# Patient Record
Sex: FEMALE | Race: WHITE | NOT HISPANIC OR LATINO | Employment: OTHER | URBAN - METROPOLITAN AREA
[De-identification: names, ages, dates, MRNs, and addresses within clinical notes are randomized per-mention and may not be internally consistent; named-entity substitution may affect disease eponyms.]

---

## 2018-04-02 ENCOUNTER — TRANSFERRED RECORDS (OUTPATIENT)
Dept: HEALTH INFORMATION MANAGEMENT | Facility: CLINIC | Age: 68
End: 2018-04-02

## 2018-04-09 ENCOUNTER — TRANSFERRED RECORDS (OUTPATIENT)
Dept: HEALTH INFORMATION MANAGEMENT | Facility: CLINIC | Age: 68
End: 2018-04-09

## 2018-11-09 ENCOUNTER — TRANSFERRED RECORDS (OUTPATIENT)
Dept: HEALTH INFORMATION MANAGEMENT | Facility: CLINIC | Age: 68
End: 2018-11-09

## 2020-12-01 ENCOUNTER — TRANSFERRED RECORDS (OUTPATIENT)
Dept: HEALTH INFORMATION MANAGEMENT | Facility: CLINIC | Age: 70
End: 2020-12-01

## 2021-01-18 ENCOUNTER — TRANSFERRED RECORDS (OUTPATIENT)
Dept: HEALTH INFORMATION MANAGEMENT | Facility: CLINIC | Age: 71
End: 2021-01-18

## 2021-04-20 ENCOUNTER — TRANSFERRED RECORDS (OUTPATIENT)
Dept: HEALTH INFORMATION MANAGEMENT | Facility: CLINIC | Age: 71
End: 2021-04-20
Payer: COMMERCIAL

## 2021-11-11 ENCOUNTER — TRANSFERRED RECORDS (OUTPATIENT)
Dept: HEALTH INFORMATION MANAGEMENT | Facility: CLINIC | Age: 71
End: 2021-11-11
Payer: COMMERCIAL

## 2021-11-30 NOTE — PATIENT INSTRUCTIONS
Patient Education   Personalized Prevention Plan  You are due for the preventive services outlined below.  Your care team is available to assist you in scheduling these services.  If you have already completed any of these items, please share that information with your care team to update in your medical record.  Health Maintenance Due   Topic Date Due     Osteoporosis Screening  Never done     ANNUAL REVIEW OF HM ORDERS  Never done     Discuss Advance Care Planning  Never done     Mammogram  Never done     Colorectal Cancer Screening  Never done     COVID-19 Vaccine (1) Never done     Hepatitis C Screening  Never done     Diptheria Tetanus Pertussis (DTAP/TDAP/TD) Vaccine (1 - Tdap) Never done     Cholesterol Lab  Never done     Zoster (Shingles) Vaccine (1 of 2) Never done     Annual Wellness Visit  Never done     FALL RISK ASSESSMENT  Never done     Pneumococcal Vaccine (1 of 1 - PPSV23) Never done     PHQ-2  Never done

## 2021-11-30 NOTE — PROGRESS NOTES
"  SUBJECTIVE:   Sara Keita is a 71 year old female who presents for Preventive Visit.    {Split Bill scripting  The purpose of this visit is to discuss your medical history and prevent health problems before you are sick. You may be responsible for a co-pay, coinsurance, or deductible if your visit today includes services such as checking on a sore throat, having an x-ray or lab test, or treating and evaluating a new or existing condition :549478}  Patient has been advised of split billing requirements and indicates understanding: {Yes and No:631240}  Are you in the first 12 months of your Medicare Part B coverage?  { :482375::\"No\"}    Physical Health:    In general, how would you rate your overall physical health? { :987723}    Outside of work, how many days during the week do you exercise? { :630721}    Outside of work, approximately how many minutes a day do you exercise?{ :655024}    If you drink alcohol do you typically have >3 drinks per day or >7 drinks per week? { :839765}    Do you usually eat at least 4 servings of fruit and vegetables a day, include whole grains & fiber and avoid regularly eating high fat or \"junk\" foods? { :904280::\"Yes\"}    Do you have any problems taking medications regularly?  { :051725::\"No\"}    Do you have any side effects from medications? { :388614}    Needs assistance for the following daily activities: { :906183}    Which of the following safety concerns are present in your home?  { :651082::\"none identified\"}     Hearing impairment: { :371706}    In the past 6 months, have you been bothered by leaking of urine? { :872106}    Mental Health:    In general, how would you rate your overall mental or emotional health? { :328833}  PHQ-2 Score:      Do you feel safe in your environment? { :604468}    Have you ever done Advance Care Planning? (For example, a Health Directive, POLST, or a discussion with a medical provider or your loved ones about your wishes): { " ":541421}    Additional concerns to address?  {If YES, notify patient they may be responsible for a copay, coinsurance or deductible if the visit today includes services such as checking on a sore throat, having an x-ray or lab test, or treating and evaluating a new or existing condition :778983::\"No\"}    Fall risk:  { :052131}  {If any of the above assessments are answered yes, consider ordering appropriate referrals (Optional):509556::\"click delete button to remove this line now\"}  Cognitive Screening: { :827430}    {Do you have sleep apnea, excessive snoring or daytime drowsiness? (Optional):606669}    {Outside tests to abstract? :075955}    {additional problems to add (Optional):156433}    Reviewed and updated as needed this visit by clinical staff                Reviewed and updated as needed this visit by Provider               Social History     Tobacco Use     Smoking status: Not on file     Smokeless tobacco: Not on file   Substance Use Topics     Alcohol use: Not on file                           Current providers sharing in care for this patient include: {Rooming staff:  Please update Care Team in Rooming Activity, refresh this note and then delete this statement}  Patient Care Team:  Jasmin Mcintyre MD as PCP - General (Family Medicine)    The following health maintenance items are reviewed in Epic and correct as of today:  Health Maintenance   Topic Date Due     DEXA  Never done     ANNUAL REVIEW OF HM ORDERS  Never done     ADVANCE CARE PLANNING  Never done     MAMMO SCREENING  Never done     COLORECTAL CANCER SCREENING  Never done     COVID-19 Vaccine (1) Never done     HEPATITIS C SCREENING  Never done     DTAP/TDAP/TD IMMUNIZATION (1 - Tdap) Never done     LIPID  Never done     ZOSTER IMMUNIZATION (1 of 2) Never done     MEDICARE ANNUAL WELLNESS VISIT  Never done     FALL RISK ASSESSMENT  Never done     Pneumococcal Vaccine: 65+ Years (1 of 1 - PPSV23) Never done     PHQ-2  Never done     " "INFLUENZA VACCINE  Completed     IPV IMMUNIZATION  Aged Out     MENINGITIS IMMUNIZATION  Aged Out     HEPATITIS B IMMUNIZATION  Aged Out     {Chronicprobdata (Optional):435626}  {Decision Support (Optional):826336}    ROS:  {ROS COMP:494406}    OBJECTIVE:   There were no vitals taken for this visit. There is no height or weight on file to calculate BMI.  EXAM:   {Exam :236647}    {Diagnostic Test Results (Optional):455593::\"Diagnostic Test Results:\",\"Labs reviewed in Epic\"}    ASSESSMENT / PLAN:   {Diag Picklist:678592}    Patient has been advised of split billing requirements and indicates understanding: {YES / NO:299113::\"Yes\"}    COUNSELING:  {Medicare Counselin}    There is no height or weight on file to calculate BMI.    {Weight Management Plan (ACO) Complete if BMI is abnormal-  Ages 18-64  BMI >24.9.  Age 65+ with BMI <23 or >30 (Optional):145978}    She has no history on file for tobacco use.    Appropriate preventive services were discussed with this patient, including applicable screening as appropriate for cardiovascular disease, diabetes, osteopenia/osteoporosis, and glaucoma.  As appropriate for age/gender, discussed screening for colorectal cancer, prostate cancer, breast cancer, and cervical cancer. Checklist reviewing preventive services available has been given to the patient.    Reviewed patients plan of care and provided an AVS. The {CarePlan:890315} for Sara meets the Care Plan requirement. This Care Plan has been established and reviewed with the {PATIENT, FAMILY MEMBER, CAREGIVER:492304}.    Counseling Resources:  ATP IV Guidelines  Pooled Cohorts Equation Calculator  Breast Cancer Risk Calculator  BRCA-Related Cancer Risk Assessment: FHS-7 Tool  FRAX Risk Assessment  ICSI Preventive Guidelines  Dietary Guidelines for Americans, 2010  [employer]'s MyPlate  ASA Prophylaxis  Lung CA Screening    Jasmin Mcintyre MD  [facility]  "

## 2021-12-02 ENCOUNTER — OFFICE VISIT (OUTPATIENT)
Dept: FAMILY MEDICINE | Facility: CLINIC | Age: 71
End: 2021-12-02
Payer: COMMERCIAL

## 2021-12-02 VITALS
TEMPERATURE: 97.5 F | BODY MASS INDEX: 20.53 KG/M2 | OXYGEN SATURATION: 97 % | SYSTOLIC BLOOD PRESSURE: 117 MMHG | HEIGHT: 65 IN | HEART RATE: 59 BPM | WEIGHT: 123.2 LBS | DIASTOLIC BLOOD PRESSURE: 75 MMHG

## 2021-12-02 DIAGNOSIS — E78.00 ELEVATED CHOLESTEROL: ICD-10-CM

## 2021-12-02 DIAGNOSIS — Z00.00 ENCOUNTER FOR MEDICARE ANNUAL WELLNESS EXAM: Primary | ICD-10-CM

## 2021-12-02 DIAGNOSIS — R79.0 LOW IRON STORES: ICD-10-CM

## 2021-12-02 DIAGNOSIS — G43.809 OTHER MIGRAINE WITHOUT STATUS MIGRAINOSUS, NOT INTRACTABLE: ICD-10-CM

## 2021-12-02 DIAGNOSIS — K52.839 MICROSCOPIC COLITIS, UNSPECIFIED MICROSCOPIC COLITIS TYPE: ICD-10-CM

## 2021-12-02 DIAGNOSIS — B02.9 HERPES ZOSTER WITHOUT COMPLICATION: ICD-10-CM

## 2021-12-02 DIAGNOSIS — F41.9 ANXIETY: ICD-10-CM

## 2021-12-02 DIAGNOSIS — G47.00 INSOMNIA, UNSPECIFIED TYPE: ICD-10-CM

## 2021-12-02 DIAGNOSIS — M85.88 OSTEOPENIA OF LUMBAR SPINE: ICD-10-CM

## 2021-12-02 LAB
ALBUMIN SERPL-MCNC: 3.6 G/DL (ref 3.4–5)
ALP SERPL-CCNC: 59 U/L (ref 40–150)
ALT SERPL W P-5'-P-CCNC: 26 U/L (ref 0–50)
ANION GAP SERPL CALCULATED.3IONS-SCNC: 2 MMOL/L (ref 3–14)
AST SERPL W P-5'-P-CCNC: 26 U/L (ref 0–45)
BILIRUB SERPL-MCNC: 0.7 MG/DL (ref 0.2–1.3)
BUN SERPL-MCNC: 11 MG/DL (ref 7–30)
CALCIUM SERPL-MCNC: 8.7 MG/DL (ref 8.5–10.1)
CHLORIDE BLD-SCNC: 103 MMOL/L (ref 94–109)
CHOLEST SERPL-MCNC: 146 MG/DL
CO2 SERPL-SCNC: 29 MMOL/L (ref 20–32)
CREAT SERPL-MCNC: 0.5 MG/DL (ref 0.52–1.04)
ERYTHROCYTE [DISTWIDTH] IN BLOOD BY AUTOMATED COUNT: 13.5 % (ref 10–15)
FASTING STATUS PATIENT QL REPORTED: NO
FERRITIN SERPL-MCNC: 60 NG/ML (ref 8–252)
GFR SERPL CREATININE-BSD FRML MDRD: >90 ML/MIN/1.73M2
GLUCOSE BLD-MCNC: 85 MG/DL (ref 70–99)
HCT VFR BLD AUTO: 41.7 % (ref 35–47)
HDLC SERPL-MCNC: 81 MG/DL
HGB BLD-MCNC: 13.8 G/DL (ref 11.7–15.7)
IRON SATN MFR SERPL: 33 % (ref 15–46)
IRON SERPL-MCNC: 109 UG/DL (ref 35–180)
LDLC SERPL CALC-MCNC: 56 MG/DL
MCH RBC QN AUTO: 31.2 PG (ref 26.5–33)
MCHC RBC AUTO-ENTMCNC: 33.1 G/DL (ref 31.5–36.5)
MCV RBC AUTO: 94 FL (ref 78–100)
NONHDLC SERPL-MCNC: 65 MG/DL
PLATELET # BLD AUTO: 389 10E3/UL (ref 150–450)
POTASSIUM BLD-SCNC: 4 MMOL/L (ref 3.4–5.3)
PROT SERPL-MCNC: 7.2 G/DL (ref 6.8–8.8)
RBC # BLD AUTO: 4.43 10E6/UL (ref 3.8–5.2)
SODIUM SERPL-SCNC: 134 MMOL/L (ref 133–144)
TIBC SERPL-MCNC: 326 UG/DL (ref 240–430)
TRIGL SERPL-MCNC: 47 MG/DL
WBC # BLD AUTO: 6.4 10E3/UL (ref 4–11)

## 2021-12-02 PROCEDURE — 80053 COMPREHEN METABOLIC PANEL: CPT | Performed by: FAMILY MEDICINE

## 2021-12-02 PROCEDURE — 36415 COLL VENOUS BLD VENIPUNCTURE: CPT | Performed by: FAMILY MEDICINE

## 2021-12-02 PROCEDURE — 99387 INIT PM E/M NEW PAT 65+ YRS: CPT | Performed by: FAMILY MEDICINE

## 2021-12-02 PROCEDURE — 80061 LIPID PANEL: CPT | Performed by: FAMILY MEDICINE

## 2021-12-02 PROCEDURE — 99214 OFFICE O/P EST MOD 30 MIN: CPT | Mod: 25 | Performed by: FAMILY MEDICINE

## 2021-12-02 PROCEDURE — 82306 VITAMIN D 25 HYDROXY: CPT | Performed by: FAMILY MEDICINE

## 2021-12-02 PROCEDURE — 82728 ASSAY OF FERRITIN: CPT | Performed by: FAMILY MEDICINE

## 2021-12-02 PROCEDURE — 83550 IRON BINDING TEST: CPT | Performed by: FAMILY MEDICINE

## 2021-12-02 PROCEDURE — 85027 COMPLETE CBC AUTOMATED: CPT | Performed by: FAMILY MEDICINE

## 2021-12-02 RX ORDER — AZITHROMYCIN 500 MG/1
500 TABLET, FILM COATED ORAL DAILY
COMMUNITY
End: 2022-05-03

## 2021-12-02 RX ORDER — BUDESONIDE 3 MG/1
6 CAPSULE, COATED PELLETS ORAL EVERY MORNING
COMMUNITY
End: 2023-06-26

## 2021-12-02 RX ORDER — TURMERIC ROOT EXTRACT 500 MG
TABLET ORAL 2 TIMES DAILY
COMMUNITY

## 2021-12-02 RX ORDER — ZOLPIDEM TARTRATE 5 MG/1
5 TABLET ORAL
COMMUNITY
End: 2022-05-09

## 2021-12-02 RX ORDER — TRIAMCINOLONE ACETONIDE 1 MG/G
CREAM TOPICAL 2 TIMES DAILY
COMMUNITY
End: 2023-04-13

## 2021-12-02 RX ORDER — LOPERAMIDE HYDROCHLORIDE 2 MG/1
2 TABLET ORAL 4 TIMES DAILY PRN
COMMUNITY
End: 2022-05-18

## 2021-12-02 RX ORDER — SUMATRIPTAN 100 MG/1
100 TABLET, FILM COATED ORAL
COMMUNITY
End: 2022-05-09

## 2021-12-02 RX ORDER — ALPRAZOLAM 0.5 MG
0.5 TABLET ORAL 3 TIMES DAILY PRN
COMMUNITY
End: 2022-05-09

## 2021-12-02 RX ORDER — SULFAMETHOXAZOLE/TRIMETHOPRIM 800-160 MG
1 TABLET ORAL 2 TIMES DAILY
COMMUNITY
End: 2022-05-03

## 2021-12-02 RX ORDER — ATORVASTATIN CALCIUM 20 MG/1
TABLET, FILM COATED ORAL
COMMUNITY
Start: 2021-11-20 | End: 2022-01-26

## 2021-12-02 RX ORDER — MULTIVIT-MIN/IRON/FOLIC ACID/K 18-600-40
CAPSULE ORAL
COMMUNITY
End: 2023-07-19

## 2021-12-02 RX ORDER — ALENDRONATE SODIUM 70 MG/1
70 TABLET ORAL
COMMUNITY
End: 2022-01-26

## 2021-12-02 RX ORDER — OXYCODONE HYDROCHLORIDE 5 MG/1
5 TABLET ORAL EVERY 6 HOURS PRN
COMMUNITY
End: 2022-05-09

## 2021-12-02 RX ORDER — FAMOTIDINE 20 MG
TABLET ORAL
COMMUNITY

## 2021-12-02 RX ORDER — FERROUS SULFATE 325(65) MG
325 TABLET ORAL
COMMUNITY
End: 2022-02-07

## 2021-12-02 RX ORDER — CITALOPRAM HYDROBROMIDE 20 MG/1
TABLET ORAL
COMMUNITY
Start: 2021-11-20 | End: 2022-12-15

## 2021-12-02 ASSESSMENT — ENCOUNTER SYMPTOMS
SHORTNESS OF BREATH: 0
PALPITATIONS: 0
DYSURIA: 0
BREAST MASS: 0
NERVOUS/ANXIOUS: 0
ABDOMINAL PAIN: 0
CONSTIPATION: 0
ARTHRALGIAS: 0
WEAKNESS: 0
DIARRHEA: 0
MYALGIAS: 0
CHILLS: 0
DIZZINESS: 0
HEADACHES: 0
HEMATOCHEZIA: 0
PARESTHESIAS: 0
SORE THROAT: 0
EYE PAIN: 0
HEARTBURN: 0
FEVER: 0
COUGH: 0
JOINT SWELLING: 0
HEMATURIA: 0
FREQUENCY: 0
NAUSEA: 0

## 2021-12-02 ASSESSMENT — PAIN SCALES - GENERAL: PAINLEVEL: NO PAIN (0)

## 2021-12-02 ASSESSMENT — ACTIVITIES OF DAILY LIVING (ADL): CURRENT_FUNCTION: NO ASSISTANCE NEEDED

## 2021-12-02 ASSESSMENT — MIFFLIN-ST. JEOR: SCORE: 1056.77

## 2021-12-02 NOTE — PROGRESS NOTES
"SUBJECTIVE:   Sara Keita is a 71 year old female who presents for Preventive Visit.      Patient has been advised of split billing requirements and indicates understanding: Yes   Are you in the first 12 months of your Medicare coverage?  No    Healthy Habits:     In general, how would you rate your overall health?  Excellent    Frequency of exercise:  2-3 days/week    Duration of exercise:  15-30 minutes    Do you usually eat at least 4 servings of fruit and vegetables a day, include whole grains    & fiber and avoid regularly eating high fat or \"junk\" foods?  Yes    Taking medications regularly:  Yes    Medication side effects:  Not applicable    Ability to successfully perform activities of daily living:  No assistance needed    Home Safety:  No safety concerns identified    Hearing Impairment:  No hearing concerns    In the past 6 months, have you been bothered by leaking of urine?  No    In general, how would you rate your overall mental or emotional health?  Excellent      PHQ-2 Total Score: 0    Additional concerns today:  No    Do you feel safe in your environment? YES    Have you ever done Advance Care Planning? (For example, a Health Directive, POLST, or a discussion with a medical provider or your loved ones about your wishes): Yes, patient states has an Advance Care Planning document and will bring a copy to the clinic.       Fall risk  Fallen 2 or more times in the past year?: No  Any fall with injury in the past year?: No    Cognitive Screening   1) Repeat 3 items (Leader, Season, Table)    2) Clock draw: NORMAL  3) 3 item recall: Recalls 3 objects  Results: 3 items recalled: COGNITIVE IMPAIRMENT LESS LIKELY    Mini-CogTM Jacquelyn Ni. Licensed by the author for use in [facility]; reprinted with permission (rip@.[facility]). All rights reserved.      Do you have sleep apnea, excessive snoring or daytime drowsiness?: no    Reviewed and updated as needed this visit by clinical " staff  Tobacco  Allergies    Med Hx  Surg Hx  Fam Hx  Soc Hx   pt here to establish care  Has elevated cholesterol on statin  Pt with osteopenia on fosamax. Per pt she was previously osteoporotic  Pt was told she has low iron stores and is on supplement so will recheck  Pt with microscopic colitis, takes imodium as needed  Will take some oral steroids if needed when she travels.   Pt with anxieto no celexa. Uses about 2 xanax per month as needed  She is a recovering alcoholic  She uses imitrex about 2x per year for migraines  She gets an occasional rash and uses the topical steroids  She has insomnia intermittently and uses ambien as needed    She is UTD on health care screenings and will get records scanned in    Reviewed and updated as needed this visit by Provider               Social History     Tobacco Use     Smoking status: Former Smoker     Smokeless tobacco: Never Used   Substance Use Topics     Alcohol use: Not Currently     If you drink alcohol do you typically have >3 drinks per day or >7 drinks per week? No    Alcohol Use 12/2/2021   Prescreen: >3 drinks/day or >7 drinks/week? Not Applicable   Prescreen: >3 drinks/day or >7 drinks/week? -         Current providers sharing in care for this patient include:   Patient Care Team:  Jasmin Mcintyre MD as PCP - General (Family Medicine)    The following health maintenance items are reviewed in Epic and correct as of today:  Health Maintenance Due   Topic Date Due     DEXA  Never done     ANNUAL REVIEW OF  ORDERS  Never done     MAMMO SCREENING  Never done     COLORECTAL CANCER SCREENING  Never done     HEPATITIS C SCREENING  Never done     LIPID  Never done     FALL RISK ASSESSMENT  Never done     Pneumococcal Vaccine: 65+ Years (1 of 1 - PPSV23) Never done     ZOSTER IMMUNIZATION (2 of 3) 02/11/2021     Lab work is in process  Mammogram Screening: Mammogram Screening: Recommended mammography every 1-2 years with patient discussion and risk  "factor consideration        Review of Systems   Constitutional: Negative for chills and fever.   HENT: Negative for congestion, ear pain, hearing loss and sore throat.    Eyes: Negative for pain and visual disturbance.   Respiratory: Negative for cough and shortness of breath.    Cardiovascular: Negative for chest pain, palpitations and peripheral edema.   Gastrointestinal: Negative for abdominal pain, constipation, diarrhea, heartburn, hematochezia and nausea.   Breasts:  Negative for tenderness, breast mass and discharge.   Genitourinary: Negative for dysuria, frequency, genital sores, hematuria, pelvic pain, urgency, vaginal bleeding and vaginal discharge.   Musculoskeletal: Negative for arthralgias, joint swelling and myalgias.   Skin: Negative for rash.   Neurological: Negative for dizziness, weakness, headaches and paresthesias.   Psychiatric/Behavioral: Negative for mood changes. The patient is not nervous/anxious.      Constitutional, HEENT, cardiovascular, pulmonary, gi and gu systems are negative, except as otherwise noted.    OBJECTIVE:   /75   Pulse 59   Temp 97.5  F (36.4  C) (Oral)   Ht 1.638 m (5' 4.5\")   Wt 55.9 kg (123 lb 3.2 oz)   SpO2 97%   BMI 20.82 kg/m   Estimated body mass index is 20.82 kg/m  as calculated from the following:    Height as of this encounter: 1.638 m (5' 4.5\").    Weight as of this encounter: 55.9 kg (123 lb 3.2 oz).  Physical Exam  GENERAL: healthy, alert and no distress  EYES: Eyes grossly normal to inspection, PERRL and conjunctivae and sclerae normal  HENT: ear canals and TM's normal, nose and mouth without ulcers or lesions  NECK: no adenopathy, no asymmetry, masses, or scars and thyroid normal to palpation  RESP: lungs clear to auscultation - no rales, rhonchi or wheezes  CV: regular rate and rhythm, normal S1 S2, no S3 or S4, no murmur, click or rub, no peripheral edema and peripheral pulses strong  ABDOMEN: soft, nontender, no hepatosplenomegaly, no masses " "and bowel sounds normal  MS: no gross musculoskeletal defects noted, no edema  SKIN: no suspicious lesions or rashes  NEURO: Normal strength and tone, mentation intact and speech normal  PSYCH: mentation appears normal, affect normal/bright    Diagnostic Test Results:  Labs reviewed in Epic    ASSESSMENT / PLAN:   (Z00.00) Encounter for Medicare annual wellness exam  (primary encounter diagnosis)  Comment:   Plan: **CBC with platelets FUTURE 2mo            (E78.00) Elevated cholesterol  Comment: recheck and refill when needed  Plan: Lipid panel reflex to direct LDL Fasting            (M85.88) Osteopenia of lumbar spine  Comment: recheck levels  Plan: **Vitamin D Deficiency FUTURE 2mo            (R79.0) Low iron stores  Comment: recheck  Plan: **Ferritin FUTURE 2mo, **Iron and iron binding         capacity FUTURE 2mo            (K52.839) Microscopic colitis, unspecified microscopic colitis type  Comment: recheck  Plan: **CBC with platelets FUTURE 2mo,         **Comprehensive metabolic panel FUTURE 2mo          Migraines,  Uses imitrex about 2x per year    Shingles. Gets recurrent rash and uses topical steroid for this    Insomia, uses ambien as needed    Anxiety, stable on celexa. Uses xanax rarely    Patient has been advised of split billing requirements and indicates understanding: Yes  COUNSELING:  Reviewed preventive health counseling, as reflected in patient instructions       Regular exercise       Healthy diet/nutrition       Vision screening       Dental care    Estimated body mass index is 20.82 kg/m  as calculated from the following:    Height as of this encounter: 1.638 m (5' 4.5\").    Weight as of this encounter: 55.9 kg (123 lb 3.2 oz).    Weight management plan noted, stable and monitoring    She reports that she has quit smoking. She has never used smokeless tobacco.      Appropriate preventive services were discussed with this patient, including applicable screening as appropriate for cardiovascular " disease, diabetes, osteopenia/osteoporosis, and glaucoma.  As appropriate for age/gender, discussed screening for colorectal cancer, prostate cancer, breast cancer, and cervical cancer. Checklist reviewing preventive services available has been given to the patient.    Reviewed patients plan of care and provided an AVS. The Intermediate Care Plan ( asthma action plan, low back pain action plan, and migraine action plan) for Sara meets the Care Plan requirement. This Care Plan has been established and reviewed with the Patient.    Counseling Resources:  ATP IV Guidelines  Pooled Cohorts Equation Calculator  Breast Cancer Risk Calculator  Breast Cancer: Medication to Reduce Risk  FRAX Risk Assessment  ICSI Preventive Guidelines  Dietary Guidelines for Americans, 2010  [employer]'s MyPlate  ASA Prophylaxis  Lung CA Screening    Jasmin Mcintyre MD  [facility]    Identified Health Risks:

## 2021-12-03 LAB — DEPRECATED CALCIDIOL+CALCIFEROL SERPL-MC: 49 UG/L (ref 20–75)

## 2021-12-12 ENCOUNTER — HEALTH MAINTENANCE LETTER (OUTPATIENT)
Age: 71
End: 2021-12-12

## 2021-12-13 ENCOUNTER — VIRTUAL VISIT (OUTPATIENT)
Dept: FAMILY MEDICINE | Facility: CLINIC | Age: 71
End: 2021-12-13
Payer: COMMERCIAL

## 2021-12-13 DIAGNOSIS — R11.0 NAUSEA: Primary | ICD-10-CM

## 2021-12-13 PROCEDURE — 99213 OFFICE O/P EST LOW 20 MIN: CPT | Mod: 95 | Performed by: FAMILY MEDICINE

## 2021-12-13 RX ORDER — ONDANSETRON 4 MG/1
4 TABLET, FILM COATED ORAL EVERY 8 HOURS PRN
Qty: 20 TABLET | Refills: 1 | Status: SHIPPED | OUTPATIENT
Start: 2021-12-13 | End: 2022-05-09

## 2021-12-13 ASSESSMENT — ENCOUNTER SYMPTOMS
DIARRHEA: 1
HEADACHES: 1
VOMITING: 0
SORE THROAT: 0
FATIGUE: 1
COUGH: 0
DIAPHORESIS: 1
NAUSEA: 1
CHILLS: 1

## 2021-12-13 NOTE — PROGRESS NOTES
Amy is a 71 year old who is being evaluated via a billable telephone visit.      What phone number would you like to be contacted at? [phone]  How would you like to obtain your AVS? [facility]    Assessment and Plan    *    (R11.0) Nausea  Comment: suspect gastro aggrevating her underlying colits.  I think if we can calm her gut a bit and get her eating again it will be helpful  Plan: ondansetron (ZOFRAN) 4 MG tablet              RTC in 1w prn    Harry Zarco MD      Subjective   Amy is a 71 year old who presents for the following health issues     HPI       Concern for COVID-19  About how many days ago did these symptoms start? Last Wednesday. She does have nausea and stomach ache as well.  Is this your first visit for this illness? No   How would you describe your symptoms since your last visit? My symptoms have worsened  In the 14 days before your symptoms started, have you had close contact with someone with COVID-19 (Coronavirus)? I do not know.  Do you have a fever or chills? Yes, I felt feverish or had chills  Are you having new or worsening difficulty breathing? No  Do you have new or worsening cough? No  Have you had any new or unexplained body aches? YES    Have you experienced any of the following NEW symptoms?    Headache: YES    Sore throat: No    Loss of taste or smell: No    Chest pain: No    Diarrhea: YES    Rash: No  What treatments have you tried? Migraine medicine  Who do you live with? Alone  Are you, or a household member, a healthcare worker or a ? No, retired  Do you live in a nursing home, group home, or shelter? No  Do you have a way to get food/medications if quarantined? Yes, I have a friend or family member who can help me.    6 days of sx.  Does have collengous colitis, and diarrhea is pretty typical., but this is long for her.  No vomiting.  Feels that nausea is biggest concern. Not eating well, nor sleeping well because of nausea.  Notes that her niece recently  had a GI bug, and she visited her recently.  Feels that sx are largely confined to her GI.  Notes that because of her colitis she has not been eating well or using her regular fiber.    Review of Systems   Constitutional: Positive for chills, diaphoresis and fatigue.   HENT: Negative for congestion and sore throat.    Respiratory: Negative for cough.    Gastrointestinal: Positive for diarrhea and nausea. Negative for vomiting.   Neurological: Positive for headaches.            Objective    Vitals - Patient Reported  Pain Score: Mild Pain (2)      Vitals:  No vitals were obtained today due to virtual visit.    Physical Exam   healthy, alert and no distress  PSYCH: Alert and oriented times 3; coherent speech, normal   rate and volume, able to articulate logical thoughts, able   to abstract reason, no tangential thoughts, no hallucinations   or delusions  Her affect is normal  RESP: No cough, no audible wheezing, able to talk in full sentences  Remainder of exam unable to be completed due to telephone visits                Phone call duration: 9 minutes

## 2021-12-30 ENCOUNTER — ANCILLARY PROCEDURE (OUTPATIENT)
Dept: MAMMOGRAPHY | Facility: CLINIC | Age: 71
End: 2021-12-30
Attending: FAMILY MEDICINE
Payer: COMMERCIAL

## 2021-12-30 DIAGNOSIS — Z12.31 VISIT FOR SCREENING MAMMOGRAM: ICD-10-CM

## 2021-12-30 PROCEDURE — 77063 BREAST TOMOSYNTHESIS BI: CPT | Mod: TC | Performed by: RADIOLOGY

## 2021-12-30 PROCEDURE — 77067 SCR MAMMO BI INCL CAD: CPT | Mod: TC | Performed by: RADIOLOGY

## 2022-01-11 ENCOUNTER — MYC MEDICAL ADVICE (OUTPATIENT)
Dept: FAMILY MEDICINE | Facility: CLINIC | Age: 72
End: 2022-01-11
Payer: COMMERCIAL

## 2022-01-12 ENCOUNTER — TELEPHONE (OUTPATIENT)
Dept: FAMILY MEDICINE | Facility: CLINIC | Age: 72
End: 2022-01-12
Payer: COMMERCIAL

## 2022-01-12 DIAGNOSIS — H53.9 VISION CHANGES: Primary | ICD-10-CM

## 2022-01-12 DIAGNOSIS — L98.9 SKIN LESION: Primary | ICD-10-CM

## 2022-01-12 NOTE — TELEPHONE ENCOUNTER
Left message for patient that we sent her Referral to Dermatology and to call and schedule an appointment at [phone].    Francia Ibarra

## 2022-01-12 NOTE — TELEPHONE ENCOUNTER
Left detailed message for patient, Opthalmology Referral was sent and to call and schedule an appointment at: [phone].    Francia Ibarra

## 2022-01-12 NOTE — TELEPHONE ENCOUNTER
Referral for opthomalogy given  Please give pt number to call to make appt.     Jasmin Mcintyre MD

## 2022-01-14 NOTE — TELEPHONE ENCOUNTER
Left message again on the mammogram truck [phone].  No asswer.    Christina Norman     TERRENCE More    
Can we call radiology to see what the hold up is    Jasmin Mcintyre MD    
I have called the [employer] truck at [phone], left a message for them to call back.  Andreina MENDEZ - Fred     
Spoke with our radiology td Zavaleta and she was able to reach out to the radiologist regarding the patients mammogram. Both her and I left a message on the mammogram trucks line to return call about how we proceed with getting these results. There number is [phone]. Let them know to return call to nurse to discuss.    Funmilayo Cohn RN [facility]    
Kerrie Whitaker  Internal Medicine  N  BENOIT NGUYEN DO,    Phone: ([phone]  Fax: [phone]  Established Patient  Follow Up Time: 2 weeks

## 2022-01-24 ENCOUNTER — TELEPHONE (OUTPATIENT)
Dept: FAMILY MEDICINE | Facility: CLINIC | Age: 72
End: 2022-01-24
Payer: COMMERCIAL

## 2022-01-24 DIAGNOSIS — H53.9 VISION CHANGES: Primary | ICD-10-CM

## 2022-01-26 DIAGNOSIS — M85.88 OSTEOPENIA OF LUMBAR SPINE: Primary | ICD-10-CM

## 2022-01-26 DIAGNOSIS — E78.00 ELEVATED CHOLESTEROL: ICD-10-CM

## 2022-01-26 RX ORDER — ATORVASTATIN CALCIUM 20 MG/1
20 TABLET, FILM COATED ORAL DAILY
Qty: 90 TABLET | Refills: 3 | Status: SHIPPED | OUTPATIENT
Start: 2022-01-26 | End: 2022-10-07

## 2022-01-26 RX ORDER — ALENDRONATE SODIUM 70 MG/1
70 TABLET ORAL
Qty: 12 TABLET | Refills: 3 | Status: SHIPPED | OUTPATIENT
Start: 2022-01-26 | End: 2022-12-15

## 2022-01-26 NOTE — TELEPHONE ENCOUNTER
Provider:  Are you willing to send the requested medications?  Two medications are on as historical and the other is not on the list at all.  Thank you. Shannan Caraballo R.N.

## 2022-01-26 NOTE — TELEPHONE ENCOUNTER
Patient calling to request refills for:  atorvastatin (LIPITOR) 20 MG tablet, alendronate (FOSAMAX) 70 MG tablet, cyclobenzaprine 5 MG- (historical rx not on list, patient has a bottle if need to bring in once back in [place])    Pharmacy: [facility] [address], [address]    Can call and leave a detailed message at: [phone] Amy Ibarra

## 2022-01-27 ENCOUNTER — TELEPHONE (OUTPATIENT)
Dept: FAMILY MEDICINE | Facility: CLINIC | Age: 72
End: 2022-01-27
Payer: COMMERCIAL

## 2022-02-07 ENCOUNTER — OFFICE VISIT (OUTPATIENT)
Dept: OPHTHALMOLOGY | Facility: CLINIC | Age: 72
End: 2022-02-07
Payer: COMMERCIAL

## 2022-02-07 DIAGNOSIS — H53.9 VISION CHANGES: ICD-10-CM

## 2022-02-07 DIAGNOSIS — H52.4 PRESBYOPIA: ICD-10-CM

## 2022-02-07 DIAGNOSIS — H25.813 COMBINED FORMS OF AGE-RELATED CATARACT OF BOTH EYES: Primary | ICD-10-CM

## 2022-02-07 DIAGNOSIS — H02.729 MADAROSIS OF EYELID, UNSPECIFIED LATERALITY: ICD-10-CM

## 2022-02-07 DIAGNOSIS — H43.813 POSTERIOR VITREOUS DETACHMENT OF BOTH EYES: ICD-10-CM

## 2022-02-07 DIAGNOSIS — G43.809 OTHER MIGRAINE WITHOUT STATUS MIGRAINOSUS, NOT INTRACTABLE: ICD-10-CM

## 2022-02-07 PROCEDURE — 92004 COMPRE OPH EXAM NEW PT 1/>: CPT | Performed by: OPHTHALMOLOGY

## 2022-02-07 PROCEDURE — 92015 DETERMINE REFRACTIVE STATE: CPT | Performed by: OPHTHALMOLOGY

## 2022-02-07 RX ORDER — BIMATOPROST 3 UG/ML
1 SOLUTION TOPICAL AT BEDTIME
Qty: 5 ML | Refills: 11 | Status: SHIPPED | OUTPATIENT
Start: 2022-02-07 | End: 2023-07-19

## 2022-02-07 ASSESSMENT — TONOMETRY
OS_IOP_MMHG: 17
IOP_METHOD: APPLANATION
OD_IOP_MMHG: 17

## 2022-02-07 ASSESSMENT — SLIT LAMP EXAM - LIDS
COMMENTS: 2+ MEIBOMIAN GLAND DYSFUNCTION
COMMENTS: 2+ MEIBOMIAN GLAND DYSFUNCTION

## 2022-02-07 ASSESSMENT — VISUAL ACUITY
OD_CC: 20/25
OS_CC: 20/40
OS_CC: 20/30
CORRECTION_TYPE: GLASSES
CORRECTION_TYPE: CONTACTS
METHOD: SNELLEN - LINEAR
OD_CC+: -2
OD_CC: 20/20
METHOD: SNELLEN - LINEAR

## 2022-02-07 ASSESSMENT — REFRACTION_WEARINGRX
OD_ADD: +3.00
OS_ADD: +3.00
OS_AXIS: 144
OD_SPHERE: -3.25
OD_AXIS: 016
OS_SPHERE: -2.75
OD_CYLINDER: +1.00
SPECS_TYPE: PAL
OS_CYLINDER: +0.25

## 2022-02-07 ASSESSMENT — CUP TO DISC RATIO
OS_RATIO: 0.6
OD_RATIO: 0.4

## 2022-02-07 ASSESSMENT — CONF VISUAL FIELD: METHOD: COUNTING FINGERS

## 2022-02-07 ASSESSMENT — REFRACTION_MANIFEST
OD_SPHERE: -2.75
OD_AXIS: 030
OS_SPHERE: -2.75
OS_ADD: +3.00
OS_CYLINDER: +0.75
OD_CYLINDER: +0.50
OS_AXIS: 160
OD_ADD: +3.00

## 2022-02-07 ASSESSMENT — EXTERNAL EXAM - RIGHT EYE: OD_EXAM: 1+ BROW PTOSIS

## 2022-02-07 ASSESSMENT — EXTERNAL EXAM - LEFT EYE: OS_EXAM: 1+ BROW PTOSIS

## 2022-02-07 NOTE — LETTER
2/7/2022         RE: Sara Keita  [address] [address]        Dear Colleague,    Thank you for referring your patient, Sara Keita, to the [facility]. Please see a copy of my visit note below.     Current Eye Medications:  Lumify every other day both eyes. Genteal as needed both eyes, only with travel.      Subjective:  Dilated exam to evaluate decreased vision and Ocular migraines. Patient is new to the area. Vision is OK both eyes in the distance. Having trouble with glare from headlights at night. Patient uses soft monthly contacts, sometimes uses 2-3 months. Having optical migraines (like looking through a kaleidescope) with headache afterwards for last 20 years, happening more often last 3 years. No eye pain or discomfort in either eye.    Has used Latisse in past; would like to resume.      Objective:  See Ophthalmology Exam.       Assessment:  Baseline dilated exam in patient with mild cataracts left eye > right eye.      ICD-10-CM    1. Combined forms of age-related cataract, mild, of both eyes  H25.813    2. Vision changes  H53.9 Adult Eye Referral     Adult Eye Referral     EYE EXAM (SIMPLE-NONBILLABLE)     REFRACTION   3. Other migraine without status migrainosus, not intractable  G43.809    4. Madarosis of eyelid, unspecified laterality  H02.729 bimatoprost (LATISSE) 0.03 % external opthalmic solution   5. Posterior vitreous detachment of both eyes  H43.813    6. Presbyopia  H52.4            Plan:  Glasses Rx given - optional  May use artificial tears up to 4 times daily both eyes. Try preservative free artificial tears (Refresh Plus).  SR card for contact lens evaluation.  Call in October 2022 for an appointment in February 2023 for Complete Exam    Dr. Gutierrez [phone]              Again, thank you for allowing me to participate in the care of your patient.        Sincerely,        Diogo Gutierrez MD

## 2022-02-07 NOTE — PROGRESS NOTES
Current Eye Medications:  Lumify every other day both eyes. Genteal as needed both eyes, only with travel.      Subjective:  Dilated exam to evaluate decreased vision and Ocular migraines. Patient is new to the area. Vision is OK both eyes in the distance. Having trouble with glare from headlights at night. Patient uses soft monthly contacts, sometimes uses 2-3 months. Having optical migraines (like looking through a kaleidescope) with headache afterwards for last 20 years, happening more often last 3 years. No eye pain or discomfort in either eye.    Has used Latisse in past; would like to resume.      Objective:  See Ophthalmology Exam.       Assessment:  Baseline dilated exam in patient with mild cataracts left eye > right eye.      ICD-10-CM    1. Combined forms of age-related cataract, mild, of both eyes  H25.813    2. Vision changes  H53.9 Adult Eye Referral     Adult Eye Referral     EYE EXAM (SIMPLE-NONBILLABLE)     REFRACTION   3. Other migraine without status migrainosus, not intractable  G43.809    4. Madarosis of eyelid, unspecified laterality  H02.729 bimatoprost (LATISSE) 0.03 % external opthalmic solution   5. Posterior vitreous detachment of both eyes  H43.813    6. Presbyopia  H52.4            Plan:  Glasses Rx given - optional  May use artificial tears up to 4 times daily both eyes. Try preservative free artificial tears (Refresh Plus).  SR card for contact lens evaluation.  Call in October 2022 for an appointment in February 2023 for Complete Exam    Dr. Gutierrez [phone]

## 2022-02-07 NOTE — PATIENT INSTRUCTIONS
Glasses Rx given - optional  May use artificial tears up to 4 times daily both eyes. Try preservative free artificial tears (Refresh Plus).  Call in October 2022 for an appointment in February 2023 for Complete Exam    Dr. Gutierrez [phone]

## 2022-02-08 PROBLEM — H25.813 COMBINED FORMS OF AGE-RELATED CATARACT OF BOTH EYES: Status: ACTIVE | Noted: 2022-02-08

## 2022-02-08 PROBLEM — H02.729: Status: ACTIVE | Noted: 2022-02-08

## 2022-02-22 NOTE — PROGRESS NOTES
"  Assessment & Plan     Microscopic colitis, unspecified microscopic colitis type  Refer to GI  - Adult Gastro Ref - Consult Only; Future    Acute bilateral low back pain without sciatica  Use as needed.  - cyclobenzaprine (FLEXERIL) 10 MG tablet; Take 1 tablet (10 mg) by mouth 3 times daily as needed for muscle spasms                 No follow-ups on file.    Jasmin Mcintyre MD  [facility] [place]   Amy is a 71 year old who presents for the following health issues     History of Present Illness       Back Pain:  She presents for follow up of back pain. Patient's back pain is a recurring problem.  Location of back pain:  Right lower back and left lower back  Description of back pain: dull ache, sharp and shooting  Back pain spreads: nowhere    Since patient first noticed back pain, pain is: always present, but gets better and worse  Does back pain interfere with her job:  Not applicable      She eats 2-3 servings of fruits and vegetables daily.She exercises with enough effort to increase her heart rate 20 to 29 minutes per day.  She exercises with enough effort to increase her heart rate 4 days per week.   She is taking medications regularly.     Refill medications    Pt would like refill of her steroid. She uses this for microscopic colitis  Will refer to GI for further management    Pt with intermittent bilateral back pain. She is requesting refill of flexeril for flare ups as needed          Review of Systems   Constitutional, HEENT, cardiovascular, pulmonary, gi and gu systems are negative, except as otherwise noted.      Objective    /62   Pulse 72   Temp 97.3  F (36.3  C) (Oral)   Resp 16   Ht 1.638 m (5' 4.5\")   Wt 57.2 kg (126 lb)   BMI 21.29 kg/m    Body mass index is 21.29 kg/m .  Physical Exam   GENERAL: healthy, alert and no distress  RESP: lungs clear to auscultation - no rales, rhonchi or wheezes  CV: regular rate and rhythm, normal S1 S2, no S3 or S4, " no murmur, click or rub, no peripheral edema and peripheral pulses strong  ABDOMEN: soft, nontender, no hepatosplenomegaly, no masses and bowel sounds normal    No results found for this or any previous visit (from the past 24 hour(s)).

## 2022-02-24 ENCOUNTER — OFFICE VISIT (OUTPATIENT)
Dept: FAMILY MEDICINE | Facility: CLINIC | Age: 72
End: 2022-02-24
Payer: COMMERCIAL

## 2022-02-24 VITALS
HEART RATE: 72 BPM | SYSTOLIC BLOOD PRESSURE: 112 MMHG | DIASTOLIC BLOOD PRESSURE: 62 MMHG | BODY MASS INDEX: 20.99 KG/M2 | WEIGHT: 126 LBS | TEMPERATURE: 97.3 F | HEIGHT: 65 IN | RESPIRATION RATE: 16 BRPM

## 2022-02-24 DIAGNOSIS — K52.839 MICROSCOPIC COLITIS, UNSPECIFIED MICROSCOPIC COLITIS TYPE: Primary | ICD-10-CM

## 2022-02-24 DIAGNOSIS — M54.50 ACUTE BILATERAL LOW BACK PAIN WITHOUT SCIATICA: ICD-10-CM

## 2022-02-24 PROCEDURE — 99213 OFFICE O/P EST LOW 20 MIN: CPT | Performed by: FAMILY MEDICINE

## 2022-02-24 RX ORDER — CYCLOBENZAPRINE HCL 10 MG
10 TABLET ORAL 3 TIMES DAILY PRN
Qty: 20 TABLET | Refills: 1 | Status: SHIPPED | OUTPATIENT
Start: 2022-02-24 | End: 2022-05-09

## 2022-02-24 RX ORDER — CYCLOBENZAPRINE HCL 10 MG
10 TABLET ORAL 3 TIMES DAILY PRN
COMMUNITY
End: 2022-02-24

## 2022-02-24 RX ORDER — BUDESONIDE 3 MG/1
6 CAPSULE, COATED PELLETS ORAL EVERY MORNING
Status: CANCELLED | OUTPATIENT
Start: 2022-02-24

## 2022-02-24 ASSESSMENT — PAIN SCALES - GENERAL: PAINLEVEL: NO PAIN (0)

## 2022-04-15 ENCOUNTER — VIRTUAL VISIT (OUTPATIENT)
Dept: FAMILY MEDICINE | Facility: CLINIC | Age: 72
End: 2022-04-15
Payer: COMMERCIAL

## 2022-04-15 DIAGNOSIS — U07.1 CLINICAL DIAGNOSIS OF COVID-19: Primary | ICD-10-CM

## 2022-04-15 PROCEDURE — 99214 OFFICE O/P EST MOD 30 MIN: CPT | Mod: 95 | Performed by: PHYSICIAN ASSISTANT

## 2022-04-15 NOTE — PROGRESS NOTES
"Amy is a 71 year old who is being evaluated via a billable video visit.      How would you like to obtain your AVS? MyChart  If the video visit is dropped, the invitation should be resent by: Text to cell phone: [phone]  Will anyone else be joining your video visit? No    Video Start Time: 3:55 PM    Assessment & Plan     Clinical diagnosis of COVID-19  Patient is a 71-year-old female, with past medical history significant for colitis-patient is currently immunosuppressed-taking budesonide, who presents to video visit due to symptoms of COVID-19 starting 2 days ago with subsequent positive COVID-19 test.  No signs of respiratory distress-patient is able to speak in full sentences.  Patient does qualify for treatment of COVID-19.  Due to possible medication interactions will proceed with application for monoclonal antibodies through [place] Department of Health.  Discussed OTC treatments for managing symptoms of COVID-19 as well as symptoms that warrant urgent/emergent follow-up.        COVID-19 positive patient.  Encounter for consideration of medication intervention. Patient does qualify for a prescription. Full discussion with patient including medication options, risks and benefits. Potential drug interactions reviewed with patient.     Treatment Planned Referral to [facility] for possible monoclonal antibodies.   Patient aware that he/she may still qualify for molnupiravir if not chosen for treatment, and still less than 5 days from symptom onset.  If so, will need to contact us ASAP.    Temporary change to home medications:  None     Estimated body mass index is 21.29 kg/m  as calculated from the following:    Height as of 2/24/22: 1.638 m (5' 4.5\").    Weight as of 2/24/22: 57.2 kg (126 lb).  GFR Estimate   Date Value Ref Range Status   12/02/2021 >90 >60 mL/min/1.73m2 Final     Comment:     As of July 11, 2021, eGFR is calculated by the CKD-EPI creatinine equation, without race adjustment. eGFR can be " influenced by muscle mass, exercise, and diet. The reported eGFR is an estimation only and is only applicable if the renal function is stable.     No results found for: [id number]    Return in about 1 week (around 4/22/2022), or if symptoms worsen or fail to improve.    ARAMIS Bowlnig [facility] [place] Reyes is a 71 year old who presents for the following health issues     HPI       COVID-19 Symptom Review  How many days ago did these symptoms start? Positive Covid19 test completed this morning, sx began 4/13. Patient has history of colitis and is currently on Budesonide due to travel.     Are any of the following symptoms significant for you?    New or worsening difficulty breathing? No    Worsening cough? Yes, it's a dry cough.     Fever or chills? Yes-99.8    Headache: YES    Sore throat: YES    Chest pain: no    Diarrhea: YES-at baseline    Body aches? YES    What treatments has patient tried? Robitussin, Airborne, Elderberry,  Metimucil   Does patient live in a nursing home, group home, or shelter? no  Does patient have a way to get food/medications during quarantined? Yes, I have a friend or family member who can help me.     MASSBP Score 4/15/2022   Age Greater than or equal to 65 years 2   BMI greater than or equal to 35 kg/m2 0   Has Diabetes Mellitus 0   Has Chronic Kidney Disease 0   Has Cardiovascular Disease and 55 years or older 0   Has Chronic Respiratory Disease and 55 years or older 0   Has Hypertension and 55 years or older 0   Is Immunocompromised 0   Is Pregnant 0   Member of BIP community (Black/, /, ,  or , or  or [place] Native)  0   MASSBP Score 2   Has the patient had a positive COVID test outside our system?  Yes   What day did symptoms start?  4/13/2022           Objective           Vitals:  No vitals were obtained today due to virtual visit.    Physical Exam   GENERAL:  Healthy, alert and no distress  EYES: Eyes grossly normal to inspection.  No discharge or erythema, or obvious scleral/conjunctival abnormalities.  RESP: No audible wheeze, cough, or visible cyanosis.  No visible retractions or increased work of breathing.    SKIN: Visible skin clear. No significant rash, abnormal pigmentation or lesions.  NEURO: Cranial nerves grossly intact.  Mentation and speech appropriate for age.  PSYCH: Mentation appears normal, affect normal/bright, judgement and insight intact, normal speech and appearance well-groomed.          Video-Visit Details    Type of service:  Video Visit    Video End Time:4:13 PM    Originating Location (pt. Location): Home    Distant Location (provider location):  [facility]     Platform used for Video Visit: [employer]

## 2022-04-15 NOTE — PATIENT INSTRUCTIONS
Raul Reyes,    Based on your health risk factors you do qualify for treatment of COVID-19.  I have sent an application to the [place] Department of Health to receive monoclonal antibodies.  Please see their messaging and information below.  If you do not hear from them, there is a phone number you can call listed in this information.    You can continue over-the-counter treatments to manage COVID-19 symptoms including Robitussin and airborne.  As discussed if you develop a severe symptom such as chest pain, difficulty breathing, coughing up blood, or fevers that you cannot control, please go to the emergency department/call 911.    Reach out with questions or concerns.    [facility],  Yvette Wade PA-C  This message is to let you know that Sara Keita may be clinically eligible for monoclonal antibodies and we have identified [facility] (mAb infusion site)  [address], [place] [address] as a site offering this treatment near them. If you would like to choose a different location you may do so right now. Please note that as soon as you submit this form, the patient information will automatically be sent to a provider at the selected location. A provider should call you or the patient within the next business day to confirm the patient can get these drugs. If you do not hear from a provider within the next business day, you or the patient can follow up with them by calling . The health care provider who gives the drug will decide if it is safe to give to those who qualify to get it. If they decide the patient is not clinically or otherwise eligible for the drug, they will not be able to receive it. Please note, their decision is final.    Patients can call [phone] with questions about monoclonal antibodies.   Discharge Instructions for COVID-19 Patients  You were tested for COVID-19. Your result was positive. This means you do have COVID-19 (coronavirus). Follow the  "instructions below. If you were tested for an upcoming procedure, you should also contact your provider for next steps.   Is there medicine to treat COVID-19?  Yes, there are two kinds of treatment: Antiviral (virus-killing) medicine and antibody treatment (called monoclonal antibodies). These have been proven safe and effective. They may make you feel better faster, keep you out of the hospital, and prevent death.   It's very important to take them early in your illness before you get worse.   Who should take this medicine?   These treatments are for people who are not in the hospital, but they're at risk of getting very sick from COVID. This includes people who:  Are over age 65  Are members of the [employer] community (black, indigenous and people of color)  Are overweight (body mass index is over 25)  Are inactive (don't exercise)  Are pregnant  Smoke or vape (now or in the past)  Have a disability  Have any of the following health problems: Diabetes, high blood pressure, cancer, heart problems, liver disease, lung disease, kidney disease, sickle cell disease, cystic fibrosis (CF), dementia and other neurological (brain) diseases, HIV, thalassemia or tuberculosis  Have ever had a stroke, organ transplant or blood cell transplant  Have a mental health problem or substance abuse disorder (drugs, alcohol)  Have a weak immune system (are immuno-compromised)  If your risk is high, we strongly urge you to get treated as soon as possible.   If symptoms started in the last 5 days: You may qualify for pills (antiviral medicines like Paxlovid and molnupiravir). To schedule an appointment to discuss COVID-19 treatment, you can:  Call your family clinic. Or, dial i-800-YZKFGLWO ([phone]) and press [phone].  Go to [email or web address] (click \"Schedule your appointment\").  Request an appointment on [employer] (select COVID-19 Treatment\").  If your symptoms started in the last 7 days: You may qualify for antibody shots. Fill " out a request form at [email or web address]. (If you don't read and write in English, or you need help with the form, call your family clinic.) Not everyone is chosen for this treatment. If you're selected, someone will contact you within 1 to 2 business days.  How can I take care of myself at home?  Get lots of rest.  Drink extra fluids (unless a doctor has told you not to).  Take acetaminophen (Tylenol) for fever or pain. (If you have liver or kidney problems, first ask your family doctor if it's safe to take acetaminophen.  Adults may take either:  650 mg acetaminophen (two 325 mg pills) every 4 to 6 hours as needed (but no more than 10 pills per day), or   1,000 mg acetaminophen (two 500 mg pills) every 6 hours as needed (but no more than 6 pills per day).  Note: Don't take more than 3,000 mg of acetaminophen (Tylenol) in one day. Acetaminophen is found in many medicines (both prescribed and over-the-counter medicines). Read all labels to be sure you don't take too much.  For children:  Check the acetaminophen (Tylenol) bottle to find out the right dose (based on their age or weight.)  Don't give children more than1,625 mg of acetaminophen in one day.  Know when to call 911. Emergency warning signs include:  Trouble breathing or shortness of breath  Pain or pressure in the chest that doesn't go away  Feeling confused like you haven't felt before, or not being able to wake up  Bluish-colored lips or face  If you have other health problems (like cancer, heart failure, an organ transplant or severe kidney disease): Call your specialty clinic if you don't feel better in the next 2 days.  How can I protect others?  If you DO have symptoms:  Stay home and away from others (self-isolate):  For at least 5 days after your symptoms started, AND UNTIL  You've had no fever for 24 hours--with no medicine that reduces fever, AND  Your other symptoms (such as a cough) are better.  Wear a mask or  face covering for 10 full days anytime you're around other people.  If you DON'T have symptoms:  Stay at home and away from others   for at least 5 days after your positive test.  Wear a mask or face covering for 10 full days anytime you're around other people.  If you plan to visit a clinic or hospital, please check their visitor guidelines before you arrive--healthcare sites may have different rules. If you were tested because you're going to have surgery or another procedure, contact your care team for next steps.  If you were severely ill from COVID-19 or have a weak immune system: stay at home and away from others for at least 10 days. Ask your doctor about other actions you should take.   During self-isolation  Stay home until it's safe to be around others.  At home, stay away from other people and pets. Or, wear a well-fitting mask when you need to be around others.  Monitor your symptoms. If you have any emergency warning signs (including trouble breathing), seek emergency medical care right away.  Stay in a separate room from other household members, if possible.  Use a separate bathroom, if possible.  Improve ventilation at home, if possible.  Don't share personal household items, like cups, towels, and utensils.  When can I go back to work?  You should NOT go back to work until you meet the guidelines above for ending your home isolation. You don't need to be re-tested for COVID-19 before going back to work. Studies show that you won't spread the virus if it's been at least   10 days since your symptoms started (or 20 days if you have a weak immune system).  Employers, schools and daycares: This document serves as formal notice of medical guidelines before your employee or student can return to work or school. They must meet the above guidelines before going back in person.   Where can I get more information?   [facility] - About COVID-19:   [email or web address]  [facility] - What to Do If  You're Sick:   www.cdc.gov/coronavirus/2019-ncov/about/steps-when-sick.html  CDC - Ending Home Isolation:   www.cdc.gov/coronavirus/2019-ncov/your-health/quarantine-isolation.html  [facility] Clinical trials (COVID-19 research studies):  [email or web address]-clinical-trials    For informational purposes only. Not to replace the advice of your health care provider. Clinically reviewed by Dr. Kevin Wilder.   Copyright   2020 [facility]. All rights reserved. [employer] 246446 - REV 03/10/22.

## 2022-04-21 ENCOUNTER — VIRTUAL VISIT (OUTPATIENT)
Dept: URGENT CARE | Facility: CLINIC | Age: 72
End: 2022-04-21
Payer: COMMERCIAL

## 2022-04-21 DIAGNOSIS — R05.9 COUGH: Primary | ICD-10-CM

## 2022-04-21 DIAGNOSIS — U07.1 INFECTION DUE TO 2019 NOVEL CORONAVIRUS: ICD-10-CM

## 2022-04-21 PROCEDURE — 99214 OFFICE O/P EST MOD 30 MIN: CPT | Mod: 95 | Performed by: PHYSICIAN ASSISTANT

## 2022-04-21 RX ORDER — BENZONATATE 200 MG/1
200 CAPSULE ORAL 3 TIMES DAILY PRN
Qty: 30 CAPSULE | Refills: 0 | Status: SHIPPED | OUTPATIENT
Start: 2022-04-21 | End: 2022-05-09

## 2022-04-21 NOTE — PROGRESS NOTES
"Amy is a 71 year old who is being evaluated via a billable video visit.      Video Start Time: 1700    Assessment & Plan     Cough  Conservative measures, deep breathing encouraged. Tessalon for cough.   Push fluids, rest and ibuprofen or tylenol for comfort.      - benzonatate (TESSALON) 200 MG capsule  Dispense: 30 capsule; Refill: 0    Infection due to 2019 novel coronavirus  Pt has mild sx at this time with no sob, chest pain, difficulty breathing, well hydrated.  She initially deferred monoclonal ab but now would like to reconsider.  disucssed it must be given within 10 days, which she is approaching, and can not [facility] will be able to schedule her with the weekend coming as well. I did reach out to [place] [facility] office and left a message for them to contact her to see if she still qualifies, or if I need to place another referral.  Called help line for [place] [employer] of [employer], who indicated if it is less than 7 days from last referral not to place a new referral, as the system will not let me. After 7 days from last referral a new referral must be placed.  I told pt to reach back out to them tomorrow if she does not hear from them by noon.  Pt agreeable with plan       Cata Sharpe PA-C  [facility]    Subjective   Amy is a 71 year old who presents for the following health issues covid 19 treatment     HPI   April 12 th in [place] and tested negative.    Flew home 28 hours.  Arrived 13th, woke up in the morning 3-14-22 ill with ST, HA, cough, chest sx, low energy.  Friend on same flight tested positive for covid, pt tested positive on the 14 th for Covid.    Video visit on 4-15-22, no tx due to drug interactions.    Monday felt better so did not do infusion.    Now not feeling much better.  Low energy.  Cough not better.  Rash in chest and shoulders \"fever rash\"    Eating breakfast well, nausea continues.  Low PO intact.  No severe sx, no sob, difficulty breathing, chest pain. " Tolerating fluids in small amounts.        COVID-19 Symptom Review  How many days ago did these symptoms start? 4-14-22    Are any of the following symptoms significant for you?    New or worsening difficulty breathing? No    Worsening cough? No    Fever or chills? No    Headache: YES    Sore throat: no    Chest pain: no    Diarrhea: no    Body aches? YES    What treatments has patient tried? Acetaminophen   Does patient live in a nursing home, group home, or shelter? no  Does patient have a way to get food/medications during quarantined? Yes, I have a friend or family member who can help me.               MASSBP Score 4/15/2022   Age Greater than or equal to 65 years 2   BMI greater than or equal to 35 kg/m2 0   Has Diabetes Mellitus 0   Has Chronic Kidney Disease 0   Has Cardiovascular Disease and 55 years or older 0   Has Chronic Respiratory Disease and 55 years or older 0   Has Hypertension and 55 years or older 0   Is Immunocompromised 0   Is Pregnant 0   Member of BIP community (Black/, /, ,  or , or  or [place] Native)  0   MASSBP Score 2   Has the patient had a positive COVID test outside our system?  Yes   What day did symptoms start?  4/13/2022       Review of Systems   Constitutional, HEENT, cardiovascular, pulmonary, gi and gu systems are negative, except as otherwise noted.      Objective           Vitals:  No vitals were obtained today due to virtual visit.    Physical Exam   GENERAL: Healthy, alert and no distress  EYES: Eyes grossly normal to inspection.  No discharge or erythema, or obvious scleral/conjunctival abnormalities.  RESP: No audible wheeze, cough, or visible cyanosis.  No visible retractions or increased work of breathing.    SKIN: Visible skin clear. No significant rash, abnormal pigmentation or lesions.  NEURO: Cranial nerves grossly intact.  Mentation and speech appropriate for age.  PSYCH: Mentation  appears normal, affect normal/bright, judgement and insight intact, normal speech and appearance well-groomed.                Video-Visit Details    Type of service:  Video Visit    Video End Time:5:30  Originating Location (pt. Location): Home    Distant Location (provider location):  [facility]     Platform used for Video Visit: [employer]

## 2022-05-03 ENCOUNTER — OFFICE VISIT (OUTPATIENT)
Dept: GASTROENTEROLOGY | Facility: CLINIC | Age: 72
End: 2022-05-03
Attending: FAMILY MEDICINE
Payer: COMMERCIAL

## 2022-05-03 VITALS
WEIGHT: 124 LBS | DIASTOLIC BLOOD PRESSURE: 88 MMHG | SYSTOLIC BLOOD PRESSURE: 120 MMHG | HEART RATE: 80 BPM | TEMPERATURE: 97 F | HEIGHT: 65 IN | OXYGEN SATURATION: 97 % | BODY MASS INDEX: 20.66 KG/M2

## 2022-05-03 DIAGNOSIS — K52.839 MICROSCOPIC COLITIS, UNSPECIFIED MICROSCOPIC COLITIS TYPE: ICD-10-CM

## 2022-05-03 DIAGNOSIS — K52.831 COLLAGENOUS COLITIS: Primary | ICD-10-CM

## 2022-05-03 PROCEDURE — 99203 OFFICE O/P NEW LOW 30 MIN: CPT | Performed by: INTERNAL MEDICINE

## 2022-05-03 RX ORDER — BUDESONIDE 3 MG/1
3 CAPSULE, COATED PELLETS ORAL EVERY MORNING
Qty: 100 CAPSULE | Refills: 1 | Status: SHIPPED | OUTPATIENT
Start: 2022-05-03 | End: 2022-05-09

## 2022-05-03 ASSESSMENT — PAIN SCALES - GENERAL: PAINLEVEL: NO PAIN (0)

## 2022-05-03 NOTE — PATIENT INSTRUCTIONS
As we discussed    1.  Aspirin and nonsteroidals can aggravate your condition.  It is unlikely that you would need transplant medicines for microscopic colitis such as Imuran.    2.  Prescription provided.    As needed return and in 2 years

## 2022-05-03 NOTE — LETTER
5/3/2022         RE: Sara Keita  [address] [facility] [address]        Dear Colleague,    Thank you for referring your patient, Sara Keita, to the [facility]. Please see a copy of my visit note below.    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  Biopsies confirm collagenous colitis.    Her current program is Metamucil in the morning and fiber in the evening with a total of 3 doses.  I half Imodium every other day.  Bowel movements are typically soft every 1 to 2 days.  No previous use of Questran.  She was on Pepto-Bismol for couple weeks without clear benefit.  She has failed naturopathic care acupuncture CBD and naltrexone for microscopic colitis.    Use of nonsteroidals is rare 1-2 times every month or so she was on aspirin 3 days a week for ocular migraines but may stop that because that these have not been effective.  She eats vegetables cooked as raw vegetables and cabbage aggravates intestinal distress.    Past medical history: Elevated cholesterol low iron stores, osteopenia history of shingles, insomnia, anxiety, cataracts    Medications reviewed on epic    Allergies reviewed on epic    Review of systems otherwise negative noncontributory.  She is to obtain her prescriptions from [place] tell that pharmacy was shut down and then she is turned to [place] for refills of Entocort.  The Entocort is only used when she travels with tapering.    Blood pressure 120 x 88, pulse 80, afebrile BMI 20.96    Head and neck unremarkable cardiac S1-S2 without murmur lungs clear throughout abdomen soft nontender without organomegaly no lower extremity edema skin without rash    Impression microscopic colitis, collagenous.  Typically exclude medication medications that can trigger microscopic colitis aspirin and nonsteroidals are in the high likelihood category.  The reasonable treatment of fiber  with intermittent loperamide is good choice the use of Entocort is only with travel and it seems a reasonable solution.  Steroid risk of cataracts osteoporosis hip circulation remain but the use is a minimal throughout the year which is different than chronic maintenance.  CABG and visual intolerance may be dyspepsia or functional intestinal disorder.  She was told at [facility] that she may need other medicines for this.  This is unlikely to be needed certainly cholestyramine and other options remain.  Imuran is not a needed treatment for the near future.  [facility] [place] pharmac[facility] suggest those endorsed by the government there is a list on the Internet.  I think [employer] drug has been replaced with [employer] medication which is either a complete competing or rebranded company.    Plan: As needed return and in 1 to 2 years.  Screening colonoscopy will be due in April 2028.  Exams above age 75 are a case-by-case consideration.  Printed prescription provided.      Again, thank you for allowing me to participate in the care of your patient.        Sincerely,        Sylvester Matute MD

## 2022-05-03 NOTE — PROGRESS NOTES
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  Biopsies confirm collagenous colitis.    Her current program is Metamucil in the morning and fiber in the evening with a total of 3 doses.  I half Imodium every other day.  Bowel movements are typically soft every 1 to 2 days.  No previous use of Questran.  She was on Pepto-Bismol for couple weeks without clear benefit.  She has failed naturopathic care acupuncture CBD and naltrexone for microscopic colitis.    Use of nonsteroidals is rare 1-2 times every month or so she was on aspirin 3 days a week for ocular migraines but may stop that because that these have not been effective.  She eats vegetables cooked as raw vegetables and cabbage aggravates intestinal distress.    Past medical history: Elevated cholesterol low iron stores, osteopenia history of shingles, insomnia, anxiety, cataracts    Medications reviewed on epic    Allergies reviewed on epic    Review of systems otherwise negative noncontributory.  She is to obtain her prescriptions from [place] tell that pharmacy was shut down and then she is turned to [place] for refills of Entocort.  The Entocort is only used when she travels with tapering.    Blood pressure 120 x 88, pulse 80, afebrile BMI 20.96    Head and neck unremarkable cardiac S1-S2 without murmur lungs clear throughout abdomen soft nontender without organomegaly no lower extremity edema skin without rash    Impression microscopic colitis, collagenous.  Typically exclude medication medications that can trigger microscopic colitis aspirin and nonsteroidals are in the high likelihood category.  The reasonable treatment of fiber with intermittent loperamide is good choice the use of Entocort is only with travel and it seems a reasonable solution.  Steroid risk of cataracts osteoporosis hip circulation remain but the use is a minimal throughout the year which is different than  chronic maintenance.  CABG and visual intolerance may be dyspepsia or functional intestinal disorder.  She was told at [facility] that she may need other medicines for this.  This is unlikely to be needed certainly cholestyramine and other options remain.  Imuran is not a needed treatment for the near future.  [facility] [place] pharmacies suggest those endorsed by the government there is a list on the Internet.  I think [employer] drug has been replaced with [employer] medication which is either a complete competing or rebranded company.    Plan: As needed return and in 1 to 2 years.  Screening colonoscopy will be due in April 2028.  Exams above age 75 are a case-by-case consideration.  Printed prescription provided.

## 2022-05-04 ENCOUNTER — OFFICE VISIT (OUTPATIENT)
Dept: DERMATOLOGY | Facility: CLINIC | Age: 72
End: 2022-05-04
Attending: FAMILY MEDICINE
Payer: COMMERCIAL

## 2022-05-04 VITALS — OXYGEN SATURATION: 96 % | SYSTOLIC BLOOD PRESSURE: 102 MMHG | DIASTOLIC BLOOD PRESSURE: 59 MMHG | HEART RATE: 78 BPM

## 2022-05-04 DIAGNOSIS — L82.0 INFLAMED SEBORRHEIC KERATOSIS: Primary | ICD-10-CM

## 2022-05-04 DIAGNOSIS — L81.4 LENTIGO: ICD-10-CM

## 2022-05-04 DIAGNOSIS — D22.9 MULTIPLE BENIGN NEVI: ICD-10-CM

## 2022-05-04 DIAGNOSIS — D18.01 CHERRY ANGIOMA: ICD-10-CM

## 2022-05-04 DIAGNOSIS — Z85.89 HISTORY OF SQUAMOUS CELL CARCINOMA: ICD-10-CM

## 2022-05-04 DIAGNOSIS — L82.1 SEBORRHEIC KERATOSIS: ICD-10-CM

## 2022-05-04 PROCEDURE — 99203 OFFICE O/P NEW LOW 30 MIN: CPT | Mod: 25 | Performed by: PHYSICIAN ASSISTANT

## 2022-05-04 PROCEDURE — 17110 DESTRUCTION B9 LES UP TO 14: CPT | Performed by: PHYSICIAN ASSISTANT

## 2022-05-04 ASSESSMENT — PAIN SCALES - GENERAL: PAINLEVEL: NO PAIN (0)

## 2022-05-04 NOTE — LETTER
5/4/2022         RE: Sara Keita  [address] [facility] [address]        Dear Colleague,    Thank you for referring your patient, Sara Keita, to the [facility]. Please see a copy of my visit note below.    Sara Keita is an extremely pleasant 71 year old year old female patient here today for skin check. She has SCC removed from left posterior thigh in 2019. She notes a few scaly areas on face, left axilla. No pain or bleeding.  Patient has no other skin complaints today.  Remainder of the HPI, Meds, PMH, Allergies, FH, and SH was reviewed in chart.    Pertinent Hx:   History of SCC on left thigh 2019  No past medical history on file.    No past surgical history on file.     Family History   Problem Relation Age of Onset     Diabetes Father      Glaucoma No family hx of      Macular Degeneration No family hx of        Social History     Socioeconomic History     Marital status: Single     Spouse name: Not on file     Number of children: Not on file     Years of education: Not on file     Highest education level: Not on file   Occupational History     Not on file   Tobacco Use     Smoking status: Former Smoker     Smokeless tobacco: Never Used   Vaping Use     Vaping Use: Never used   Substance and Sexual Activity     Alcohol use: Not Currently     Drug use: Never     Sexual activity: Not Currently   Other Topics Concern     Not on file   Social History Narrative     Not on file     Social Determinants of Health     Financial Resource Strain: Not on file   Food Insecurity: Not on file   Transportation Needs: Not on file   Physical Activity: Not on file   Stress: Not on file   Social Connections: Not on file   Intimate Partner Violence: Not on file   Housing Stability: Not on file       Outpatient Encounter Medications as of 5/4/2022   Medication Sig Dispense Refill     alendronate (FOSAMAX) 70 MG tablet Take 1 tablet (70 mg) by mouth every 7 days 12 tablet 3      Ascorbic Acid (VITAMIN C) 500 MG CAPS        atorvastatin (LIPITOR) 20 MG tablet Take 1 tablet (20 mg) by mouth daily 90 tablet 3     bimatoprost (LATISSE) 0.03 % external opthalmic solution Apply 1 drop topically At Bedtime 5 mL 11     budesonide (ENTOCORT EC) 3 MG EC capsule Take 6 mg by mouth every morning       calcium carbonate 600 mg-vitamin D 400 units (CALTRATE) 600-400 MG-UNIT per tablet Take 1 tablet by mouth 2 times daily       citalopram (CELEXA) 20 MG tablet        triamcinolone (KENALOG) 0.1 % external cream Apply topically 2 times daily       Turmeric 500 MG TABS        Vitamin D, Cholecalciferol, 25 MCG (1000 UT) CAPS        ALPRAZolam (XANAX) 0.5 MG tablet Take 0.5 mg by mouth 3 times daily as needed for anxiety (Patient not taking: Reported on 5/4/2022)       benzonatate (TESSALON) 200 MG capsule Take 1 capsule (200 mg) by mouth 3 times daily as needed for cough (Patient not taking: Reported on 5/4/2022) 30 capsule 0     budesonide (ENTOCORT EC) 3 MG EC capsule Take 1 capsule (3 mg) by mouth every morning As directed. 100 capsule 1     cyclobenzaprine (FLEXERIL) 10 MG tablet Take 1 tablet (10 mg) by mouth 3 times daily as needed for muscle spasms (Patient not taking: Reported on 5/4/2022) 20 tablet 1     loperamide (IMODIUM A-D) 2 MG tablet Take 2 mg by mouth 4 times daily as needed for diarrhea (Patient not taking: Reported on 5/4/2022)       ondansetron (ZOFRAN) 4 MG tablet Take 1 tablet (4 mg) by mouth every 8 hours as needed for nausea (Patient not taking: Reported on 5/4/2022) 20 tablet 1     oxyCODONE (ROXICODONE) 5 MG tablet Take 5 mg by mouth every 6 hours as needed for severe pain (TAKES WHEN TRAVELS IF NEEDED) (Patient not taking: Reported on 5/4/2022)       SUMAtriptan (IMITREX) 100 MG tablet Take 100 mg by mouth at onset of headache for migraine (Patient not taking: Reported on 5/4/2022)       zolpidem (AMBIEN) 5 MG tablet Take 5 mg by mouth nightly as needed for sleep (Patient not  taking: Reported on 5/4/2022)       No facility-administered encounter medications on file as of 5/4/2022.             O:   NAD, WDWN, Alert & Oriented, Mood & Affect wnl, Vitals stable   Here today alone   /59   Pulse 78   SpO2 96%    General appearance normal   Vitals stable   Alert, oriented and in no acute distress      Brown stuck papules on face   Stuck on papules and brown macules on trunk and ext   Red papules on trunk  Brown papules and macules with regular pigment network and borders on torso and extremities     The remainder of skin exam is normal       Eyes: Conjunctivae/lids:Normal     ENT: Lips: normal    MSK:Normal    Cardiovascular: peripheral edema none    Pulm: Breathing Normal    Neuro/Psych: Orientation:Alert and Orientedx3 ; Mood/Affect:normal   A/P:  1. Inflamed seborrheic keratosis on left cheek x 2 and left axilla x 1  LN2:  Treated with LN2 for 5s for 1-2 cycles. Warned risks of blistering, pain, pigment change, scarring, and incomplete resolution.  Advised patient to return if lesions do not completely resolve.  Wound care sheet given.  2.Seborrheic keratosis, lentigo, angioma, benign nevi, history of SCC  BENIGN LESIONS DISCUSSED WITH PATIENT:  I discussed the specifics of tumor, prognosis, and genetics of benign lesions.  I explained that treatment of these lesions would be purely cosmetic and not medically neccessary.  I discussed with patient different removal options including excision, cautery and /or laser.      Nature and genetics of benign skin lesions dicussed with patient.  Signs and Symptoms of skin cancer discussed with patient.  ABCDEs of melanoma reviewed with patient.  Patient encouraged to perform monthly skin exams.  UV precautions reviewed with patient.  Risks of non-melanoma skin cancer discussed with patient   Return to clinic in one year or sooner if needed.       Again, thank you for allowing me to participate in the care of your patient.         Sincerely,        Lynne Odom PA-C

## 2022-05-05 ENCOUNTER — OFFICE VISIT (OUTPATIENT)
Dept: OPTOMETRY | Facility: CLINIC | Age: 72
End: 2022-05-05
Payer: COMMERCIAL

## 2022-05-05 DIAGNOSIS — H52.223 REGULAR ASTIGMATISM OF BOTH EYES: ICD-10-CM

## 2022-05-05 DIAGNOSIS — H52.13 MYOPIA OF BOTH EYES: ICD-10-CM

## 2022-05-05 DIAGNOSIS — Z46.0 CONTACT LENS/GLASSES FITTING: Primary | ICD-10-CM

## 2022-05-05 PROCEDURE — 92310 CONTACT LENS FITTING OU: CPT | Performed by: OPTOMETRIST

## 2022-05-05 ASSESSMENT — REFRACTION_WEARINGRX
OS_CYLINDER: SPHERE
OD_SPHERE: +2.50
SPECS_TYPE: OTC CHEATERS
OS_SPHERE: +2.50
OD_CYLINDER: SPHERE

## 2022-05-05 ASSESSMENT — EXTERNAL EXAM - RIGHT EYE: OD_EXAM: 1+ BROW PTOSIS

## 2022-05-05 ASSESSMENT — VISUAL ACUITY
METHOD: SNELLEN - LINEAR
OS_CC: 20/30
CORRECTION_TYPE: CONTACTS
OD_SC: 20/120-1
OS_SC: 20/200
OS_CC: 20/20
OD_CC: 20/30-1
OD_CC+: -2
OD_CC: 20/25

## 2022-05-05 ASSESSMENT — SLIT LAMP EXAM - LIDS
COMMENTS: 2+ MEIBOMIAN GLAND DYSFUNCTION
COMMENTS: 2+ MEIBOMIAN GLAND DYSFUNCTION

## 2022-05-05 ASSESSMENT — REFRACTION_MANIFEST
OD_SPHERE: -2.50
OD_CYLINDER: +0.50
OS_AXIS: 152
OS_SPHERE: -2.75
OS_CYLINDER: +1.00
OD_AXIS: 020

## 2022-05-05 ASSESSMENT — REFRACTION_CURRENTRX
OD_SPHERE: -2.25
OS_BRAND: COOPER BIOFINITY TORIC BC 8.7, D 14.5
OD_ADD: LOW
OS_AXIS: 060
OS_CYLINDER: -0.75
OS_SPHERE: -2.00
OD_CYLINDER: SPHERE
OD_BRAND: COOPER BIOFINITY BC 8.6, D 14.0
OD_BASECURVE: 8.6
OS_ADD: HIGH
OS_BASECURVE: 8.6
OD_SPHERE: -2.75
OS_CYLINDER: SPHERE
OS_SPHERE: -2.25

## 2022-05-05 ASSESSMENT — CONF VISUAL FIELD
OS_NORMAL: 1
OD_NORMAL: 1

## 2022-05-05 ASSESSMENT — EXTERNAL EXAM - LEFT EYE: OS_EXAM: 1+ BROW PTOSIS

## 2022-05-05 NOTE — LETTER
5/5/2022         RE: Sara Keita  [address] [facility] [address]        Dear Colleague,    Thank you for referring your patient, Sara Keita, to the [facility]. Please see a copy of my visit note below.    Chief Complaint   Patient presents with     New Eval For Contact Lens        Previous contact lens wearer? Yes: Pure Vision MF each eye   Comfort of contact lenses :Good  Satisfied with current lenses: No - driving is becoming more difficult - street signs harder to see     Is hoping to get a single vision distance Rx because she is wearing +2.50 cheaters over her contacts anyways    She also struggles at computer distance and is wondering how to fix that - she does not use the computer very often but when she does, she struggles    OK with $75 fitting fee    Last Eye Exam: 2/7/2022 with Dr. Gutierrez  Dilated Previously: Yes, side effects of dilation explained today    What are you currently using to see?  Glasses and Contacts - wears contacts most time      Ellyn Gavino     Medical, surgical and family histories reviewed and updated 5/5/2022.       OBJECTIVE: See Ophthalmology exam    ASSESSMENT:    ICD-10-CM    1. Contact lens/glasses fitting  Z46.0    2. Myopia of both eyes  H52.13    3. Regular astigmatism of both eyes  H52.223       PLAN:     Patient Instructions   Begin trial of Biofinity contact lenses.   Maximum wear-time of 12 hours/day of the contact lenses.   Clean the lenses nightly in contact lens solution.   No sleeping or swimming in the contact lenses.     Trial the new lenses for at least 1-2 weeks.  If adequate comfort/vision with contact lenses, we can finalize the prescription. If not, notify me and we can consider other options.       Reynaldo Barron, OD  [facility]  [address] [place]  [address]    [phone]                     Again, thank you for allowing me to participate in the care of your patient.         Sincerely,        Reynaldo Barron, OD

## 2022-05-05 NOTE — PROGRESS NOTES
Chief Complaint   Patient presents with     New Eval For Contact Lens        Previous contact lens wearer? Yes: Pure Vision MF each eye   Comfort of contact lenses :Good  Satisfied with current lenses: No - driving is becoming more difficult - street signs harder to see     Is hoping to get a single vision distance Rx because she is wearing +2.50 cheaters over her contacts anyways    She also struggles at computer distance and is wondering how to fix that - she does not use the computer very often but when she does, she struggles    OK with $75 fitting fee    Last Eye Exam: 2/7/2022 with Dr. Gutierrez  Dilated Previously: Yes, side effects of dilation explained today    What are you currently using to see?  Glasses and Contacts - wears contacts most time      [facility]     Medical, surgical and family histories reviewed and updated 5/5/2022.       OBJECTIVE: See Ophthalmology exam    ASSESSMENT:    ICD-10-CM    1. Contact lens/glasses fitting  Z46.0    2. Myopia of both eyes  H52.13    3. Regular astigmatism of both eyes  H52.223       PLAN:     Patient Instructions   Begin trial of Biofinity contact lenses.   Maximum wear-time of 12 hours/day of the contact lenses.   Clean the lenses nightly in contact lens solution.   No sleeping or swimming in the contact lenses.     Trial the new lenses for at least 1-2 weeks.  If adequate comfort/vision with contact lenses, we can finalize the prescription. If not, notify me and we can consider other options.       Reynaldo Barron, SHAQUILLE  [address] [place]  [address]    [phone]

## 2022-05-05 NOTE — PROGRESS NOTES
Sara Keita is an extremely pleasant 71 year old year old female patient here today for skin check. She has SCC removed from left posterior thigh in 2019. She notes a few scaly areas on face, left axilla. No pain or bleeding.  Patient has no other skin complaints today.  Remainder of the HPI, Meds, PMH, Allergies, FH, and SH was reviewed in chart.    Pertinent Hx:   History of SCC on left thigh 2019  No past medical history on file.    No past surgical history on file.     Family History   Problem Relation Age of Onset     Diabetes Father      Glaucoma No family hx of      Macular Degeneration No family hx of        Social History     Socioeconomic History     Marital status: Single     Spouse name: Not on file     Number of children: Not on file     Years of education: Not on file     Highest education level: Not on file   Occupational History     Not on file   Tobacco Use     Smoking status: Former Smoker     Smokeless tobacco: Never Used   Vaping Use     Vaping Use: Never used   Substance and Sexual Activity     Alcohol use: Not Currently     Drug use: Never     Sexual activity: Not Currently   Other Topics Concern     Not on file   Social History Narrative     Not on file     Social Determinants of Health     Financial Resource Strain: Not on file   Food Insecurity: Not on file   Transportation Needs: Not on file   Physical Activity: Not on file   Stress: Not on file   Social Connections: Not on file   Intimate Partner Violence: Not on file   Housing Stability: Not on file       Outpatient Encounter Medications as of 5/4/2022   Medication Sig Dispense Refill     alendronate (FOSAMAX) 70 MG tablet Take 1 tablet (70 mg) by mouth every 7 days 12 tablet 3     Ascorbic Acid (VITAMIN C) 500 MG CAPS        atorvastatin (LIPITOR) 20 MG tablet Take 1 tablet (20 mg) by mouth daily 90 tablet 3     bimatoprost (LATISSE) 0.03 % external opthalmic solution Apply 1 drop topically At Bedtime 5 mL 11     budesonide  (ENTOCORT EC) 3 MG EC capsule Take 6 mg by mouth every morning       calcium carbonate 600 mg-vitamin D 400 units (CALTRATE) 600-400 MG-UNIT per tablet Take 1 tablet by mouth 2 times daily       citalopram (CELEXA) 20 MG tablet        triamcinolone (KENALOG) 0.1 % external cream Apply topically 2 times daily       Turmeric 500 MG TABS        Vitamin D, Cholecalciferol, 25 MCG (1000 UT) CAPS        ALPRAZolam (XANAX) 0.5 MG tablet Take 0.5 mg by mouth 3 times daily as needed for anxiety (Patient not taking: Reported on 5/4/2022)       benzonatate (TESSALON) 200 MG capsule Take 1 capsule (200 mg) by mouth 3 times daily as needed for cough (Patient not taking: Reported on 5/4/2022) 30 capsule 0     budesonide (ENTOCORT EC) 3 MG EC capsule Take 1 capsule (3 mg) by mouth every morning As directed. 100 capsule 1     cyclobenzaprine (FLEXERIL) 10 MG tablet Take 1 tablet (10 mg) by mouth 3 times daily as needed for muscle spasms (Patient not taking: Reported on 5/4/2022) 20 tablet 1     loperamide (IMODIUM A-D) 2 MG tablet Take 2 mg by mouth 4 times daily as needed for diarrhea (Patient not taking: Reported on 5/4/2022)       ondansetron (ZOFRAN) 4 MG tablet Take 1 tablet (4 mg) by mouth every 8 hours as needed for nausea (Patient not taking: Reported on 5/4/2022) 20 tablet 1     oxyCODONE (ROXICODONE) 5 MG tablet Take 5 mg by mouth every 6 hours as needed for severe pain (TAKES WHEN TRAVELS IF NEEDED) (Patient not taking: Reported on 5/4/2022)       SUMAtriptan (IMITREX) 100 MG tablet Take 100 mg by mouth at onset of headache for migraine (Patient not taking: Reported on 5/4/2022)       zolpidem (AMBIEN) 5 MG tablet Take 5 mg by mouth nightly as needed for sleep (Patient not taking: Reported on 5/4/2022)       No facility-administered encounter medications on file as of 5/4/2022.             O:   NAD, WDWN, Alert & Oriented, Mood & Affect wnl, Vitals stable   Here today alone   /59   Pulse 78   SpO2 96%    General  appearance normal   Vitals stable   Alert, oriented and in no acute distress      Brown stuck papules on face   Stuck on papules and brown macules on trunk and ext   Red papules on trunk  Brown papules and macules with regular pigment network and borders on torso and extremities     The remainder of skin exam is normal       Eyes: Conjunctivae/lids:Normal     ENT: Lips: normal    MSK:Normal    Cardiovascular: peripheral edema none    Pulm: Breathing Normal    Neuro/Psych: Orientation:Alert and Orientedx3 ; Mood/Affect:normal   A/P:  1. Inflamed seborrheic keratosis on left cheek x 2 and left axilla x 1  LN2:  Treated with LN2 for 5s for 1-2 cycles. Warned risks of blistering, pain, pigment change, scarring, and incomplete resolution.  Advised patient to return if lesions do not completely resolve.  Wound care sheet given.  2.Seborrheic keratosis, lentigo, angioma, benign nevi, history of SCC  BENIGN LESIONS DISCUSSED WITH PATIENT:  I discussed the specifics of tumor, prognosis, and genetics of benign lesions.  I explained that treatment of these lesions would be purely cosmetic and not medically neccessary.  I discussed with patient different removal options including excision, cautery and /or laser.      Nature and genetics of benign skin lesions dicussed with patient.  Signs and Symptoms of skin cancer discussed with patient.  ABCDEs of melanoma reviewed with patient.  Patient encouraged to perform monthly skin exams.  UV precautions reviewed with patient.  Risks of non-melanoma skin cancer discussed with patient   Return to clinic in one year or sooner if needed.

## 2022-05-05 NOTE — PATIENT INSTRUCTIONS
Begin trial of Biofinity contact lenses.   Maximum wear-time of 12 hours/day of the contact lenses.   Clean the lenses nightly in contact lens solution.   No sleeping or swimming in the contact lenses.     Trial the new lenses for at least 1-2 weeks.  If adequate comfort/vision with contact lenses, we can finalize the prescription. If not, notify me and we can consider other options.       Reynaldo Barron OD  [address]  [place]  [address]    [phone]

## 2022-05-08 ENCOUNTER — E-VISIT (OUTPATIENT)
Dept: FAMILY MEDICINE | Facility: CLINIC | Age: 72
End: 2022-05-08
Payer: COMMERCIAL

## 2022-05-08 DIAGNOSIS — M25.511 ACUTE PAIN OF RIGHT SHOULDER: Primary | ICD-10-CM

## 2022-05-08 PROCEDURE — 99421 OL DIG E/M SVC 5-10 MIN: CPT | Performed by: FAMILY MEDICINE

## 2022-05-16 ENCOUNTER — TELEPHONE (OUTPATIENT)
Dept: ORTHOPEDICS | Facility: CLINIC | Age: 72
End: 2022-05-16

## 2022-05-16 ENCOUNTER — OFFICE VISIT (OUTPATIENT)
Dept: ORTHOPEDICS | Facility: CLINIC | Age: 72
End: 2022-05-16
Attending: FAMILY MEDICINE
Payer: COMMERCIAL

## 2022-05-16 VITALS
HEIGHT: 65 IN | SYSTOLIC BLOOD PRESSURE: 109 MMHG | WEIGHT: 126.8 LBS | BODY MASS INDEX: 21.13 KG/M2 | DIASTOLIC BLOOD PRESSURE: 61 MMHG

## 2022-05-16 DIAGNOSIS — M19.019 SHOULDER ARTHRITIS: Primary | ICD-10-CM

## 2022-05-16 DIAGNOSIS — M25.511 ACUTE PAIN OF RIGHT SHOULDER: ICD-10-CM

## 2022-05-16 PROCEDURE — 99203 OFFICE O/P NEW LOW 30 MIN: CPT | Performed by: PEDIATRICS

## 2022-05-16 ASSESSMENT — PAIN SCALES - GENERAL: PAINLEVEL: NO PAIN (0)
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
## 2022-05-16 NOTE — PATIENT INSTRUCTIONS
Discussed nature of shoulder osteoarthritis. Discussed symptom treatment with over-the-counter medications, ice or heat and rest if needed. Discussed physical therapy for strength. Discussed injection therapy including subacromial or glenohumeral corticosteroid injections. Also briefly discussed future consideration of referral to orthopedic surgery for further evaluation and discussion of arthroplasty.     Plan:  - Today's Plan of Care:  Rehab: Physical Therapy: [facility] - [phone]  Discussed activity considerations and other supportive care including Ice/Heat, OTC and other topical medications as needed.    -We also discussed other future treatment options:  US guided injections  Referral to Orthopedic Surgery    Follow Up: as needed    If you have any further questions for your physician or physician s care team you can call [phone] and use option 3 to leave a voice message. Calls received during business hours will be returned same day.

## 2022-05-16 NOTE — TELEPHONE ENCOUNTER
Patient LVM that she would like to attend [facility].   Requesting referral be sent over to their office.   Number for [facility], [phone]    Patient Call back number  [phone]    Risa Esquivel MS ATC
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
## 2022-05-19 ENCOUNTER — OFFICE VISIT (OUTPATIENT)
Dept: FAMILY MEDICINE | Facility: CLINIC | Age: 72
End: 2022-05-19
Payer: COMMERCIAL

## 2022-05-19 VITALS
HEART RATE: 71 BPM | SYSTOLIC BLOOD PRESSURE: 103 MMHG | DIASTOLIC BLOOD PRESSURE: 50 MMHG | HEIGHT: 65 IN | TEMPERATURE: 98 F | RESPIRATION RATE: 17 BRPM | OXYGEN SATURATION: 96 % | BODY MASS INDEX: 20.66 KG/M2 | WEIGHT: 124 LBS

## 2022-05-19 DIAGNOSIS — G89.29 CHRONIC RIGHT SHOULDER PAIN: Primary | ICD-10-CM

## 2022-05-19 DIAGNOSIS — M25.511 CHRONIC RIGHT SHOULDER PAIN: Primary | ICD-10-CM

## 2022-05-19 PROCEDURE — 99213 OFFICE O/P EST LOW 20 MIN: CPT | Performed by: FAMILY MEDICINE

## 2022-05-19 ASSESSMENT — PAIN SCALES - GENERAL: PAINLEVEL: NO PAIN (0)

## 2022-05-24 ENCOUNTER — TRANSFERRED RECORDS (OUTPATIENT)
Dept: HEALTH INFORMATION MANAGEMENT | Facility: CLINIC | Age: 72
End: 2022-05-24
Payer: COMMERCIAL

## 2022-06-13 ENCOUNTER — E-VISIT (OUTPATIENT)
Dept: FAMILY MEDICINE | Facility: CLINIC | Age: 72
End: 2022-06-13
Payer: COMMERCIAL

## 2022-06-13 DIAGNOSIS — F32.1 CURRENT MODERATE EPISODE OF MAJOR DEPRESSIVE DISORDER, UNSPECIFIED WHETHER RECURRENT (H): Primary | ICD-10-CM

## 2022-06-13 PROCEDURE — 99421 OL DIG E/M SVC 5-10 MIN: CPT | Performed by: FAMILY MEDICINE

## 2022-06-13 ASSESSMENT — ANXIETY QUESTIONNAIRES
GAD7 TOTAL SCORE: 7
7. FEELING AFRAID AS IF SOMETHING AWFUL MIGHT HAPPEN: SEVERAL DAYS
2. NOT BEING ABLE TO STOP OR CONTROL WORRYING: MORE THAN HALF THE DAYS
6. BECOMING EASILY ANNOYED OR IRRITABLE: SEVERAL DAYS
5. BEING SO RESTLESS THAT IT IS HARD TO SIT STILL: NOT AT ALL
8. IF YOU CHECKED OFF ANY PROBLEMS, HOW DIFFICULT HAVE THESE MADE IT FOR YOU TO DO YOUR WORK, TAKE CARE OF THINGS AT HOME, OR GET ALONG WITH OTHER PEOPLE?: NOT DIFFICULT AT ALL
3. WORRYING TOO MUCH ABOUT DIFFERENT THINGS: SEVERAL DAYS
1. FEELING NERVOUS, ANXIOUS, OR ON EDGE: MORE THAN HALF THE DAYS
4. TROUBLE RELAXING: NOT AT ALL
7. FEELING AFRAID AS IF SOMETHING AWFUL MIGHT HAPPEN: SEVERAL DAYS

## 2022-06-13 ASSESSMENT — PATIENT HEALTH QUESTIONNAIRE - PHQ9
10. IF YOU CHECKED OFF ANY PROBLEMS, HOW DIFFICULT HAVE THESE PROBLEMS MADE IT FOR YOU TO DO YOUR WORK, TAKE CARE OF THINGS AT HOME, OR GET ALONG WITH OTHER PEOPLE: NOT DIFFICULT AT ALL
SUM OF ALL RESPONSES TO PHQ QUESTIONS 1-9: 7
SUM OF ALL RESPONSES TO PHQ QUESTIONS 1-9: 7

## 2022-06-14 ASSESSMENT — ANXIETY QUESTIONNAIRES: GAD7 TOTAL SCORE: 7

## 2022-06-14 ASSESSMENT — PATIENT HEALTH QUESTIONNAIRE - PHQ9: SUM OF ALL RESPONSES TO PHQ QUESTIONS 1-9: 7

## 2022-07-26 ENCOUNTER — TRANSFERRED RECORDS (OUTPATIENT)
Dept: ORTHOPEDICS | Facility: CLINIC | Age: 72
End: 2022-07-26

## 2022-07-28 ENCOUNTER — OFFICE VISIT (OUTPATIENT)
Dept: OTOLARYNGOLOGY | Facility: CLINIC | Age: 72
End: 2022-07-28
Attending: FAMILY MEDICINE
Payer: COMMERCIAL

## 2022-07-28 VITALS — HEART RATE: 67 BPM | RESPIRATION RATE: 16 BRPM | OXYGEN SATURATION: 96 %

## 2022-07-28 DIAGNOSIS — D10.1 FIBROMA OF TONGUE: Primary | ICD-10-CM

## 2022-07-28 DIAGNOSIS — K13.70 ORAL LESION: ICD-10-CM

## 2022-07-28 PROCEDURE — 99203 OFFICE O/P NEW LOW 30 MIN: CPT | Performed by: OTOLARYNGOLOGY

## 2022-07-28 NOTE — LETTER
7/28/2022         RE: Sara Keita  [id number] [facility] [address]        Dear Colleague,    Thank you for referring your patient, Sara Keita, to the [facility]. Please see a copy of my visit note below.    Chief Complaint - oral lesion    History of Present Illness - Sara Keita is a 71 year old female presents with a lesion on the tongue. The patient has noticed for approximately 2.5 months. It hasn't been changing in size. It isn't painful. No citrus or spicy intolerance. No bleeding. Nonsmoker, and no history of chewing tobacco. No lumps or swollen glands in the neck.     Tests personally reviewed today for this visit:   1.) CBC 12/2/21 was normal  2.) CMP j12/2/21 was Cr 0.5 otherwise normal    Past Medical History -   Patient Active Problem List   Diagnosis     Microscopic colitis, unspecified microscopic colitis type     Osteopenia of lumbar spine     Elevated cholesterol     Low iron stores     Other migraine without status migrainosus, not intractable     Insomnia, unspecified type     Herpes zoster without complication     Anxiety     Combined forms of age-related cataract, mild, of both eyes     Madarosis of eyelid, unspecified laterality       Current Medications -   Current Outpatient Medications:      alendronate (FOSAMAX) 70 MG tablet, Take 1 tablet (70 mg) by mouth every 7 days, Disp: 12 tablet, Rfl: 3     Ascorbic Acid (VITAMIN C) 500 MG CAPS, , Disp: , Rfl:      atorvastatin (LIPITOR) 20 MG tablet, Take 1 tablet (20 mg) by mouth daily, Disp: 90 tablet, Rfl: 3     bimatoprost (LATISSE) 0.03 % external opthalmic solution, Apply 1 drop topically At Bedtime, Disp: 5 mL, Rfl: 11     budesonide (ENTOCORT EC) 3 MG EC capsule, Take 6 mg by mouth every morning, Disp: , Rfl:      calcium carbonate 600 mg-vitamin D 400 units (CALTRATE) 600-400 MG-UNIT per tablet, Take 1 tablet by mouth 2 times daily, Disp: , Rfl:      citalopram (CELEXA) 20 MG tablet, , Disp: ,  Rfl:      triamcinolone (KENALOG) 0.1 % external cream, Apply topically 2 times daily, Disp: , Rfl:      Turmeric 500 MG TABS, , Disp: , Rfl:      Vitamin D, Cholecalciferol, 25 MCG (1000 UT) CAPS, , Disp: , Rfl:     Allergies - No Known Allergies    Social History -   Social History     Socioeconomic History     Marital status: Single   Tobacco Use     Smoking status: Former Smoker     Smokeless tobacco: Never Used   Vaping Use     Vaping Use: Never used   Substance and Sexual Activity     Alcohol use: Not Currently     Drug use: Never     Sexual activity: Not Currently       Family History -   Family History   Problem Relation Age of Onset     Diabetes Father      Glaucoma No family hx of      Macular Degeneration No family hx of        Review of Systems - As per HPI and PMHx, otherwise 7 system review of the head and neck negative.    Physical Exam  VItals - B/P: Data Unavailable, T: Data Unavailable, P: Data Unavailable, R: Data Unavailable  General - The patient is in no distress. Alert and oriented x3, answers questions and cooperates with examination appropriately.   Voice and Breathing - The patient was breathing comfortably without the use of accessory muscles. There was no wheezing, stridor, or stertor.  The patients voice was clear and strong.  Eyes - Extraocular movements intact. Sclera were not icteric or injected, conjunctiva were pink and moist.  Neurologic - Cranial nerves II-XII are grossly intact. Specifically, the facial nerve is intact, House-Brackmann grade 1 of 6.   Mouth - Examination of the oral cavity showed a 3 mm lesion located on the left latera tongue. It is smooth and pedunculated consistent with a fibroma. The left lower inner lip also has one, 2 mm.   Neck -  Soft. Non-tender. Palpation of the occipital, submental, submandibular, internal jugular chain, and supraclavicular nodes did not demonstrate any abnormal lymph nodes or masses. The parotid glands were without masses.      A/P -  Sara Keita is a 71 year old female with a lesion on the left lateral tongue and left inner lip.  Both seem consistent with fibromas.  Reassured her these are benign.  She is not biting them they do not cause her any problems.  Therefore I do not recommend removal.  If they change or grow or bleed she should return.      Saad Kennedy MD  Otolaryngology  [facility]        Again, thank you for allowing me to participate in the care of your patient.        Sincerely,        Saad Kennedy MD

## 2022-07-28 NOTE — PROGRESS NOTES
Chief Complaint - oral lesion    History of Present Illness - Sara Keita is a 71 year old female presents with a lesion on the tongue. The patient has noticed for approximately 2.5 months. It hasn't been changing in size. It isn't painful. No citrus or spicy intolerance. No bleeding. Nonsmoker, and no history of chewing tobacco. No lumps or swollen glands in the neck.     Tests personally reviewed today for this visit:   1.) CBC 12/2/21 was normal  2.) CMP j12/2/21 was Cr 0.5 otherwise normal    Past Medical History -   Patient Active Problem List   Diagnosis     Microscopic colitis, unspecified microscopic colitis type     Osteopenia of lumbar spine     Elevated cholesterol     Low iron stores     Other migraine without status migrainosus, not intractable     Insomnia, unspecified type     Herpes zoster without complication     Anxiety     Combined forms of age-related cataract, mild, of both eyes     Madarosis of eyelid, unspecified laterality       Current Medications -   Current Outpatient Medications:      alendronate (FOSAMAX) 70 MG tablet, Take 1 tablet (70 mg) by mouth every 7 days, Disp: 12 tablet, Rfl: 3     Ascorbic Acid (VITAMIN C) 500 MG CAPS, , Disp: , Rfl:      atorvastatin (LIPITOR) 20 MG tablet, Take 1 tablet (20 mg) by mouth daily, Disp: 90 tablet, Rfl: 3     bimatoprost (LATISSE) 0.03 % external opthalmic solution, Apply 1 drop topically At Bedtime, Disp: 5 mL, Rfl: 11     budesonide (ENTOCORT EC) 3 MG EC capsule, Take 6 mg by mouth every morning, Disp: , Rfl:      calcium carbonate 600 mg-vitamin D 400 units (CALTRATE) 600-400 MG-UNIT per tablet, Take 1 tablet by mouth 2 times daily, Disp: , Rfl:      citalopram (CELEXA) 20 MG tablet, , Disp: , Rfl:      triamcinolone (KENALOG) 0.1 % external cream, Apply topically 2 times daily, Disp: , Rfl:      Turmeric 500 MG TABS, , Disp: , Rfl:      Vitamin D, Cholecalciferol, 25 MCG (1000 UT) CAPS, , Disp: , Rfl:     Allergies - No Known  Allergies    Social History -   Social History     Socioeconomic History     Marital status: Single   Tobacco Use     Smoking status: Former Smoker     Smokeless tobacco: Never Used   Vaping Use     Vaping Use: Never used   Substance and Sexual Activity     Alcohol use: Not Currently     Drug use: Never     Sexual activity: Not Currently       Family History -   Family History   Problem Relation Age of Onset     Diabetes Father      Glaucoma No family hx of      Macular Degeneration No family hx of        Review of Systems - As per HPI and PMHx, otherwise 7 system review of the head and neck negative.    Physical Exam  VItals - B/P: Data Unavailable, T: Data Unavailable, P: Data Unavailable, R: Data Unavailable  General - The patient is in no distress. Alert and oriented x3, answers questions and cooperates with examination appropriately.   Voice and Breathing - The patient was breathing comfortably without the use of accessory muscles. There was no wheezing, stridor, or stertor.  The patients voice was clear and strong.  Eyes - Extraocular movements intact. Sclera were not icteric or injected, conjunctiva were pink and moist.  Neurologic - Cranial nerves II-XII are grossly intact. Specifically, the facial nerve is intact, House-Brackmann grade 1 of 6.   Mouth - Examination of the oral cavity showed a 3 mm lesion located on the left latera tongue. It is smooth and pedunculated consistent with a fibroma. The left lower inner lip also has one, 2 mm.   Neck -  Soft. Non-tender. Palpation of the occipital, submental, submandibular, internal jugular chain, and supraclavicular nodes did not demonstrate any abnormal lymph nodes or masses. The parotid glands were without masses.      A/P - Sara Keita is a 71 year old female with a lesion on the left lateral tongue and left inner lip.  Both seem consistent with fibromas.  Reassured her these are benign.  She is not biting them they do not cause her any problems.   Therefore I do not recommend removal.  If they change or grow or bleed she should return.      Saad Kennedy MD  Otolaryngology  [facility]

## 2022-08-25 ENCOUNTER — TRANSFERRED RECORDS (OUTPATIENT)
Dept: HEALTH INFORMATION MANAGEMENT | Facility: CLINIC | Age: 72
End: 2022-08-25

## 2022-10-03 ENCOUNTER — HEALTH MAINTENANCE LETTER (OUTPATIENT)
Age: 72
End: 2022-10-03

## 2022-11-17 ENCOUNTER — TELEPHONE (OUTPATIENT)
Dept: FAMILY MEDICINE | Facility: CLINIC | Age: 72
End: 2022-11-17

## 2022-11-17 NOTE — TELEPHONE ENCOUNTER
Patient Quality Outreach    Patient is due for the following:   Depression  -  PHQ-9 needed and DAP  Physical Annual Wellness Visit      Topic Date Due     Pneumococcal Vaccine (1 - PCV) Never done     Zoster (Shingles) Vaccine (2 of 3) 02/11/2021       Next Steps:   Schedule a Annual Wellness Visit    Type of outreach:    Sent Em[employer] message.      Questions for provider review:    None     Mat Pulliam CMA

## 2022-12-08 ASSESSMENT — ENCOUNTER SYMPTOMS
FREQUENCY: 0
HEADACHES: 0
FEVER: 0
CHILLS: 0
CONSTIPATION: 0
DIZZINESS: 0
JOINT SWELLING: 0
NERVOUS/ANXIOUS: 0
DYSURIA: 0
HEMATOCHEZIA: 0
HEMATURIA: 0
ARTHRALGIAS: 0
ABDOMINAL PAIN: 0
DIARRHEA: 0
PARESTHESIAS: 0
WEAKNESS: 0
HEARTBURN: 0
BREAST MASS: 0
NAUSEA: 0
SHORTNESS OF BREATH: 0
SORE THROAT: 0
COUGH: 0
EYE PAIN: 0
PALPITATIONS: 0
MYALGIAS: 1

## 2022-12-08 ASSESSMENT — ACTIVITIES OF DAILY LIVING (ADL): CURRENT_FUNCTION: NO ASSISTANCE NEEDED

## 2022-12-15 ENCOUNTER — OFFICE VISIT (OUTPATIENT)
Dept: FAMILY MEDICINE | Facility: CLINIC | Age: 72
End: 2022-12-15
Payer: COMMERCIAL

## 2022-12-15 VITALS
TEMPERATURE: 97.5 F | OXYGEN SATURATION: 97 % | SYSTOLIC BLOOD PRESSURE: 123 MMHG | WEIGHT: 126 LBS | DIASTOLIC BLOOD PRESSURE: 74 MMHG | RESPIRATION RATE: 16 BRPM | BODY MASS INDEX: 20.99 KG/M2 | HEART RATE: 77 BPM | HEIGHT: 65 IN

## 2022-12-15 DIAGNOSIS — G47.00 INSOMNIA, UNSPECIFIED TYPE: ICD-10-CM

## 2022-12-15 DIAGNOSIS — Z00.00 ENCOUNTER FOR MEDICARE ANNUAL WELLNESS EXAM: Primary | ICD-10-CM

## 2022-12-15 DIAGNOSIS — E78.00 ELEVATED CHOLESTEROL: ICD-10-CM

## 2022-12-15 DIAGNOSIS — Z12.31 ENCOUNTER FOR SCREENING MAMMOGRAM FOR BREAST CANCER: ICD-10-CM

## 2022-12-15 DIAGNOSIS — M85.88 OSTEOPENIA OF LUMBAR SPINE: ICD-10-CM

## 2022-12-15 DIAGNOSIS — M54.9 BACK PAIN, UNSPECIFIED BACK LOCATION, UNSPECIFIED BACK PAIN LATERALITY, UNSPECIFIED CHRONICITY: ICD-10-CM

## 2022-12-15 DIAGNOSIS — F41.9 ANXIETY: ICD-10-CM

## 2022-12-15 PROCEDURE — 36415 COLL VENOUS BLD VENIPUNCTURE: CPT | Performed by: FAMILY MEDICINE

## 2022-12-15 PROCEDURE — G0438 PPPS, INITIAL VISIT: HCPCS | Performed by: FAMILY MEDICINE

## 2022-12-15 PROCEDURE — 80061 LIPID PANEL: CPT | Performed by: FAMILY MEDICINE

## 2022-12-15 PROCEDURE — 80053 COMPREHEN METABOLIC PANEL: CPT | Performed by: FAMILY MEDICINE

## 2022-12-15 PROCEDURE — 99214 OFFICE O/P EST MOD 30 MIN: CPT | Mod: 25 | Performed by: FAMILY MEDICINE

## 2022-12-15 PROCEDURE — 90677 PCV20 VACCINE IM: CPT | Performed by: FAMILY MEDICINE

## 2022-12-15 PROCEDURE — G0009 ADMIN PNEUMOCOCCAL VACCINE: HCPCS | Performed by: FAMILY MEDICINE

## 2022-12-15 RX ORDER — ZOLPIDEM TARTRATE 5 MG/1
5 TABLET ORAL
Qty: 30 TABLET | Refills: 0 | Status: SHIPPED | OUTPATIENT
Start: 2022-12-15 | End: 2023-12-26

## 2022-12-15 RX ORDER — CITALOPRAM HYDROBROMIDE 20 MG/1
20 TABLET ORAL DAILY
Qty: 90 TABLET | Refills: 3 | Status: SHIPPED | OUTPATIENT
Start: 2022-12-15 | End: 2023-12-22

## 2022-12-15 RX ORDER — ATORVASTATIN CALCIUM 20 MG/1
20 TABLET, FILM COATED ORAL DAILY
Qty: 100 TABLET | Refills: 3 | Status: SHIPPED | OUTPATIENT
Start: 2022-12-15 | End: 2023-12-22

## 2022-12-15 RX ORDER — OXYCODONE AND ACETAMINOPHEN 5; 325 MG/1; MG/1
1 TABLET ORAL EVERY 6 HOURS PRN
Qty: 3 TABLET | Refills: 0 | Status: SHIPPED | OUTPATIENT
Start: 2022-12-15 | End: 2022-12-18

## 2022-12-15 RX ORDER — ALENDRONATE SODIUM 70 MG/1
70 TABLET ORAL
Qty: 12 TABLET | Refills: 3 | Status: SHIPPED | OUTPATIENT
Start: 2022-12-15 | End: 2023-11-12

## 2022-12-15 ASSESSMENT — ENCOUNTER SYMPTOMS
MYALGIAS: 1
HEMATURIA: 0
PARESTHESIAS: 0
CONSTIPATION: 0
FREQUENCY: 0
HEADACHES: 0
FEVER: 0
ARTHRALGIAS: 0
JOINT SWELLING: 0
NAUSEA: 0
SHORTNESS OF BREATH: 0
EYE PAIN: 0
DIARRHEA: 0
DIZZINESS: 0
COUGH: 0
BREAST MASS: 0
HEARTBURN: 0
DYSURIA: 0
HEMATOCHEZIA: 0
SORE THROAT: 0
WEAKNESS: 0
PALPITATIONS: 0
CHILLS: 0
NERVOUS/ANXIOUS: 0
ABDOMINAL PAIN: 0

## 2022-12-15 ASSESSMENT — PAIN SCALES - GENERAL: PAINLEVEL: NO PAIN (0)

## 2022-12-15 ASSESSMENT — PATIENT HEALTH QUESTIONNAIRE - PHQ9
SUM OF ALL RESPONSES TO PHQ QUESTIONS 1-9: 2
10. IF YOU CHECKED OFF ANY PROBLEMS, HOW DIFFICULT HAVE THESE PROBLEMS MADE IT FOR YOU TO DO YOUR WORK, TAKE CARE OF THINGS AT HOME, OR GET ALONG WITH OTHER PEOPLE: NOT DIFFICULT AT ALL
SUM OF ALL RESPONSES TO PHQ QUESTIONS 1-9: 2

## 2022-12-15 ASSESSMENT — ACTIVITIES OF DAILY LIVING (ADL): CURRENT_FUNCTION: NO ASSISTANCE NEEDED

## 2022-12-15 NOTE — PATIENT INSTRUCTIONS
Patient Education   Personalized Prevention Plan  You are due for the preventive services outlined below.  Your care team is available to assist you in scheduling these services.  If you have already completed any of these items, please share that information with your care team to update in your medical record.  Health Maintenance Due   Topic Date Due     Osteoporosis Screening  Never done     Depression Action Plan  Never done     Pneumococcal Vaccine (1 - PCV) Never done     Zoster (Shingles) Vaccine (2 of 3) 02/11/2021     ANNUAL REVIEW OF HM ORDERS  12/13/2022     Annual Wellness Visit  12/02/2022

## 2022-12-15 NOTE — PROGRESS NOTES
"SUBJECTIVE:   Amy is a 72 year old who presents for Preventive Visit.      Patient has been advised of split billing requirements and indicates understanding: Yes  Are you in the first 12 months of your Medicare coverage?  No    Healthy Habits:     In general, how would you rate your overall health?  Excellent    Frequency of exercise:  2-3 days/week    Duration of exercise:  30-45 minutes    Do you usually eat at least 4 servings of fruit and vegetables a day, include whole grains    & fiber and avoid regularly eating high fat or \"junk\" foods?  Yes    Taking medications regularly:  Yes    Medication side effects:  None    Ability to successfully perform activities of daily living:  No assistance needed    Home Safety:  No safety concerns identified    Hearing Impairment:  Difficulty following a conversation in a noisy restaurant or crowded room and difficulty understanding soft or whispered speech    In the past 6 months, have you been bothered by leaking of urine?  No    In general, how would you rate your overall mental or emotional health?  Excellent      PHQ-2 Total Score: 0    Additional concerns today:  Yes    HEARING FREQUENCY    Right Ear:      1000 Hz RESPONSE- on Level: 40 db (Conditioning sound)   1000 Hz: RESPONSE- on Level:   20 db    2000 Hz: RESPONSE- on Level:   20 db    4000 Hz: RESPONSE- on Level:   20 db     Left Ear:      4000 Hz: RESPONSE- on Level: 45 db   2000 Hz: RESPONSE- on Level: 25 db   1000 Hz: RESPONSE- on Level:   20 db     500 Hz: RESPONSE- on Level: 25 db    Right Ear:    500 Hz: RESPONSE- on Level:   20 db     Hearing Acuity: REFER    Hearing Assessment: abnormal--refer to audiology      Have you ever done Advance Care Planning? (For example, a Health Directive, POLST, or a discussion with a medical provider or your loved ones about your wishes): Yes, patient states has an Advance Care Planning document and will bring a copy to the clinic.       Fall risk  Fallen 2 or more times " in the past year?: No  Any fall with injury in the past year?: No    Cognitive Screening no concerns based on direct observation    Do you have sleep apnea, excessive snoring or daytime drowsiness?: no    Reviewed and updated as needed this visit by clinical staff   Tobacco  Allergies  Meds              Reviewed and updated as needed this visit by Provider                 Social History     Tobacco Use     Smoking status: Former     Packs/day: 1.00     Years: 7.00     Pack years: 7.00     Types: Cigarettes     Start date: 1966     Quit date: 1973     Years since quittin.9     Smokeless tobacco: Never     Tobacco comments:     I smoked from  to    Substance Use Topics     Alcohol use: Not Currently     If you drink alcohol do you typically have >3 drinks per day or >7 drinks per week? No    Alcohol Use 12/15/2022   Prescreen: >3 drinks/day or >7 drinks/week? -   Prescreen: >3 drinks/day or >7 drinks/week? No     Diagnosed in 2018 and has been on fosamax since  So for 4 years has been on fosamax  Will get baseline dexa scan           Current providers sharing in care for this patient include:  :Patient Care Team:  Jasmin Mcintyre MD as PCP - General (Family Medicine)  Jasmin Mcintyre MD as Assigned PCP  Jasmin Mcintyre MD as Referring Physician (Family Medicine)  Lynne Gavin PA-C as Physician Assistant (Dermatology)  Sylvester Matute MD as MD (Gastroenterology)  Shea Brown MD as Assigned Musculoskeletal Provider  Saad Kennedy MD as Assigned Surgical Provider    The following health maintenance items are reviewed in Epic and correct as of today:  Health Maintenance   Topic Date Due     DEXA  Never done     DEPRESSION ACTION PLAN  Never done     ZOSTER IMMUNIZATION (2 of 3) 2021     ANNUAL REVIEW OF HM ORDERS  2022     MEDICARE ANNUAL WELLNESS VISIT  2022     PHQ-9  06/15/2023     FALL RISK ASSESSMENT  12/15/2023     MAMMO  SCREENING  12/30/2023     LIPID  12/02/2026     ADVANCE CARE PLANNING  12/15/2027     COLORECTAL CANCER SCREENING  04/02/2028     DTAP/TDAP/TD IMMUNIZATION (4 - Td or Tdap) 06/10/2029     INFLUENZA VACCINE  Completed     Pneumococcal Vaccine: 65+ Years  Completed     COVID-19 Vaccine  Completed     HEPATITIS C SCREENING  Addressed     IPV IMMUNIZATION  Aged Out     MENINGITIS IMMUNIZATION  Aged Out     Lab work is in process  Pneumonia Vaccine:For adults 65 years or older who do not have an immunocompromising condition, cerebrospinal fluid leak, or cochlear implant and want to receive PCV13 AND PPSV23: Administer 1 dose of PCV13 first then give 1 dose of PPSV23 at least 1 year later. If the patient already received PPSV23, give the dose of PCV13 at least 1 year after they received the most recent dose of PPSV23. Anyone who received any doses of PPSV23 before age 65 should receive 1 final dose of the vaccine at age 65 or older. Administer this last dose at least 5 years after the prior PPSV23 dose.  Mammogram Screening: Mammogram Screening: Recommended mammography every 1-2 years with patient discussion and risk factor consideration        Review of Systems   Constitutional: Negative for chills and fever.   HENT: Negative for congestion, ear pain, hearing loss and sore throat.    Eyes: Negative for pain and visual disturbance.   Respiratory: Negative for cough and shortness of breath.    Cardiovascular: Negative for chest pain, palpitations and peripheral edema.   Gastrointestinal: Negative for abdominal pain, constipation, diarrhea, heartburn, hematochezia and nausea.   Breasts:  Negative for tenderness, breast mass and discharge.   Genitourinary: Negative for dysuria, frequency, genital sores, hematuria, pelvic pain, urgency, vaginal bleeding and vaginal discharge.   Musculoskeletal: Positive for myalgias. Negative for arthralgias and joint swelling.   Skin: Negative for rash.   Neurological: Negative for  "dizziness, weakness, headaches and paresthesias.   Psychiatric/Behavioral: Negative for mood changes. The patient is not nervous/anxious.      Constitutional, HEENT, cardiovascular, pulmonary, gi and gu systems are negative, except as otherwise noted.    OBJECTIVE:   /74   Pulse 77   Temp 97.5  F (36.4  C) (Oral)   Resp 16   Ht 1.638 m (5' 4.5\")   Wt 57.2 kg (126 lb)   LMP  (LMP Unknown)   SpO2 97%   BMI 21.29 kg/m   Estimated body mass index is 21.29 kg/m  as calculated from the following:    Height as of this encounter: 1.638 m (5' 4.5\").    Weight as of this encounter: 57.2 kg (126 lb).  Physical Exam  GENERAL: healthy, alert and no distress  EYES: Eyes grossly normal to inspection, PERRL and conjunctivae and sclerae normal  HENT: ear canals and TM's normal, nose and mouth without ulcers or lesions  NECK: no adenopathy, no asymmetry, masses, or scars and thyroid normal to palpation  RESP: lungs clear to auscultation - no rales, rhonchi or wheezes  CV: regular rate and rhythm, normal S1 S2, no S3 or S4, no murmur, click or rub, no peripheral edema and peripheral pulses strong  ABDOMEN: soft, nontender, no hepatosplenomegaly, no masses and bowel sounds normal  MS: no gross musculoskeletal defects noted, no edema  SKIN: no suspicious lesions or rashes  NEURO: Normal strength and tone, mentation intact and speech normal  PSYCH: mentation appears normal, affect normal/bright    Diagnostic Test Results:  Labs reviewed in Epic    ASSESSMENT / PLAN:   (Z00.00) Encounter for Medicare annual wellness exam  (primary encounter diagnosis)  Comment: completed  Plan:     (M85.88) Osteopenia of lumbar spine  Comment: get baseline [place]xa [place], one more year of fosamax will make it 5 years  Plan: DEXA HIP/PELVIS/SPINE - Future, alendronate         (FOSAMAX) 70 MG tablet            (G47.00) Insomnia, unspecified type  Comment: refill to use on trips as needed  Plan: zolpidem (AMBIEN) 5 MG " tablet            (F41.9) Anxiety  Comment: on celexa and working well  Plan:     (M54.9) Back pain, unspecified back location, unspecified back pain laterality, unspecified chronicity  Comment: to use if needed while on vacation  Plan: oxyCODONE-acetaminophen (PERCOCET) 5-325 MG         tablet            (E78.00) Elevated cholesterol  Comment: recheck and refill meds  Plan: atorvastatin (LIPITOR) 20 MG tablet, Lipid         panel reflex to direct LDL Non-fasting,         **Comprehensive metabolic panel FUTURE 2mo            (Z12.31) Encounter for screening mammogram for breast cancer  Comment: scheduled  Plan: *MA Screening Digital Bilateral              Patient has been advised of split billing requirements and indicates understanding: Yes      COUNSELING:  Reviewed preventive health counseling, as reflected in patient instructions       Regular exercise       Healthy diet/nutrition       Vision screening       Hearing screening       Dental care       Osteoporosis prevention/bone health        She reports that she quit smoking about 49 years ago. Her smoking use included cigarettes. She started smoking about 56 years ago. She has a 7.00 pack-year smoking history. She has never used smokeless tobacco.      Appropriate preventive services were discussed with this patient, including applicable screening as appropriate for cardiovascular disease, diabetes, osteopenia/osteoporosis, and glaucoma.  As appropriate for age/gender, discussed screening for colorectal cancer, prostate cancer, breast cancer, and cervical cancer. Checklist reviewing preventive services available has been given to the patient.    Reviewed patients plan of care and provided an AVS. The Intermediate Care Plan ( asthma action plan, low back pain action plan, and migraine action plan) for Sara meets the Care Plan requirement. This Care Plan has been established and reviewed with the Patient.      Jasmin Mcintyre MD  [facility]  [facility]    Identified Health Risks:  Answers for HPI/ROS submitted by the patient on 12/15/2022  If you checked off any problems, how difficult have these problems made it for you to do your work, take care of things at home, or get along with other people?: Not difficult at all  PHQ9 TOTAL SCORE: 2

## 2022-12-16 LAB
ALBUMIN SERPL-MCNC: 3.9 G/DL (ref 3.4–5)
ALP SERPL-CCNC: 49 U/L (ref 40–150)
ALT SERPL W P-5'-P-CCNC: 24 U/L (ref 0–50)
ANION GAP SERPL CALCULATED.3IONS-SCNC: 4 MMOL/L (ref 3–14)
AST SERPL W P-5'-P-CCNC: 23 U/L (ref 0–45)
BILIRUB SERPL-MCNC: 0.7 MG/DL (ref 0.2–1.3)
BUN SERPL-MCNC: 11 MG/DL (ref 7–30)
CALCIUM SERPL-MCNC: 8.9 MG/DL (ref 8.5–10.1)
CHLORIDE BLD-SCNC: 104 MMOL/L (ref 94–109)
CHOLEST SERPL-MCNC: 169 MG/DL
CO2 SERPL-SCNC: 29 MMOL/L (ref 20–32)
CREAT SERPL-MCNC: 0.55 MG/DL (ref 0.52–1.04)
FASTING STATUS PATIENT QL REPORTED: NO
GFR SERPL CREATININE-BSD FRML MDRD: >90 ML/MIN/1.73M2
GLUCOSE BLD-MCNC: 88 MG/DL (ref 70–99)
HDLC SERPL-MCNC: 87 MG/DL
LDLC SERPL CALC-MCNC: 73 MG/DL
NONHDLC SERPL-MCNC: 82 MG/DL
POTASSIUM BLD-SCNC: 3.9 MMOL/L (ref 3.4–5.3)
PROT SERPL-MCNC: 7.5 G/DL (ref 6.8–8.8)
SODIUM SERPL-SCNC: 137 MMOL/L (ref 133–144)
TRIGL SERPL-MCNC: 46 MG/DL

## 2023-02-03 ENCOUNTER — ANCILLARY PROCEDURE (OUTPATIENT)
Dept: MAMMOGRAPHY | Facility: CLINIC | Age: 73
End: 2023-02-03
Attending: FAMILY MEDICINE
Payer: COMMERCIAL

## 2023-02-03 DIAGNOSIS — Z12.31 ENCOUNTER FOR SCREENING MAMMOGRAM FOR BREAST CANCER: ICD-10-CM

## 2023-02-03 PROCEDURE — 77063 BREAST TOMOSYNTHESIS BI: CPT | Mod: TC | Performed by: RADIOLOGY

## 2023-02-03 PROCEDURE — 77067 SCR MAMMO BI INCL CAD: CPT | Mod: TC | Performed by: RADIOLOGY

## 2023-02-08 ENCOUNTER — ANCILLARY PROCEDURE (OUTPATIENT)
Dept: BONE DENSITY | Facility: CLINIC | Age: 73
End: 2023-02-08
Attending: FAMILY MEDICINE
Payer: COMMERCIAL

## 2023-02-08 DIAGNOSIS — M85.88 OSTEOPENIA OF LUMBAR SPINE: ICD-10-CM

## 2023-02-08 PROCEDURE — 77080 DXA BONE DENSITY AXIAL: CPT | Performed by: RADIOLOGY

## 2023-04-03 DIAGNOSIS — M54.50 ACUTE BILATERAL LOW BACK PAIN WITHOUT SCIATICA: ICD-10-CM

## 2023-04-04 RX ORDER — CYCLOBENZAPRINE HCL 10 MG
10 TABLET ORAL 3 TIMES DAILY PRN
Qty: 20 TABLET | Refills: 1 | Status: SHIPPED | OUTPATIENT
Start: 2023-04-04 | End: 2023-12-26

## 2023-04-11 ENCOUNTER — MYC REFILL (OUTPATIENT)
Dept: FAMILY MEDICINE | Facility: CLINIC | Age: 73
End: 2023-04-11
Payer: COMMERCIAL

## 2023-04-11 RX ORDER — TRIAMCINOLONE ACETONIDE 1 MG/G
CREAM TOPICAL 2 TIMES DAILY
Status: CANCELLED | OUTPATIENT
Start: 2023-04-11

## 2023-04-12 NOTE — TELEPHONE ENCOUNTER
Fax RX request also rcvd from pharmacy. NX  
I am not sure what I am treating  I have not prescribed her the triamcinolone in the past    Jasmin Mcintyre MD    
Patient notified of provider's message as written below. Patient assisted in making an appointment as listed below.  Patient verbalized good understanding, had no further questions and needed no further support.Shannan Caraballo R.N.  Next 5 appointments (look out 90 days)    Apr 13, 2023  9:00 AM  (Arrive by 8:40 AM)  Provider Visit with Jasmin Mcintyre MD  [facility] ([facility] ) [address] [facility] [address]  [phone]   Jun 28, 2023  3:30 PM  (Arrive by 3:15 PM)  Return Visit with Lynne Gavin PA-C  [facility] ([facility] ) [address] [place] [id number] [phone]          
PRE-OP DIAGNOSIS:  Lateral meniscus tear 06-Jan-2023 11:21:06  Juanito Garcia  Medial meniscus tear 06-Jan-2023 11:20:27  Juanito Garcia

## 2023-04-13 ENCOUNTER — OFFICE VISIT (OUTPATIENT)
Dept: FAMILY MEDICINE | Facility: CLINIC | Age: 73
End: 2023-04-13
Payer: COMMERCIAL

## 2023-04-13 VITALS
TEMPERATURE: 97.8 F | HEART RATE: 68 BPM | BODY MASS INDEX: 21.51 KG/M2 | OXYGEN SATURATION: 98 % | SYSTOLIC BLOOD PRESSURE: 105 MMHG | WEIGHT: 126 LBS | RESPIRATION RATE: 16 BRPM | DIASTOLIC BLOOD PRESSURE: 69 MMHG | HEIGHT: 64 IN

## 2023-04-13 DIAGNOSIS — R21 RASH: Primary | ICD-10-CM

## 2023-04-13 PROCEDURE — 99213 OFFICE O/P EST LOW 20 MIN: CPT | Performed by: FAMILY MEDICINE

## 2023-04-13 RX ORDER — TRIAMCINOLONE ACETONIDE 1 MG/G
CREAM TOPICAL 2 TIMES DAILY
Qty: 30 G | Refills: 3 | Status: SHIPPED | OUTPATIENT
Start: 2023-04-13

## 2023-04-13 ASSESSMENT — PAIN SCALES - GENERAL: PAINLEVEL: NO PAIN (0)

## 2023-04-13 NOTE — PROGRESS NOTES
"  Assessment & Plan     Rash  Refill steroid cream, use sparingly as needed  - triamcinolone (KENALOG) 0.1 % external cream; Apply topically 2 times daily                 Jasmin Mcintyre MD  [facility]   Amy is a 72 year old, presenting for the following health issues:  Lesion (On back/)        4/13/2023     8:49 AM   Additional Questions   Roomed by yariel     History of Present Illness       Reason for visit:  A spot on my back that itches and has itched for months    She eats 2-3 servings of fruits and vegetables daily.She consumes 0 sweetened beverage(s) daily.She exercises with enough effort to increase her heart rate 30 to 60 minutes per day.  She exercises with enough effort to increase her heart rate 4 days per week.   She is taking medications regularly.     Pt with intermittent itchy rash on right mid back  Has bad before and gotten better with the topical steroids  She would like a refill of her triamcinolone cream before she goes [place] traveling    Review of Systems   Constitutional, HEENT, cardiovascular, pulmonary, gi and gu systems are negative, except as otherwise noted.      Objective    /69   Pulse 68   Temp 97.8  F (36.6  C) (Oral)   Resp 16   Ht 1.626 m (5' 4\")   Wt 57.2 kg (126 lb)   LMP  (LMP Unknown)   SpO2 98%   BMI 21.63 kg/m    Body mass index is 21.63 kg/m .  Physical Exam   GENERAL: healthy, alert and no distress  SKIN: few scattered erythematous papular lesions on right mid back, also noted several seb keratosis    No results found for this or any previous visit (from the past 24 hour(s)).                "

## 2023-05-14 ENCOUNTER — E-VISIT (OUTPATIENT)
Dept: FAMILY MEDICINE | Facility: CLINIC | Age: 73
End: 2023-05-14
Payer: COMMERCIAL

## 2023-05-14 DIAGNOSIS — J06.9 VIRAL URI WITH COUGH: Primary | ICD-10-CM

## 2023-05-14 DIAGNOSIS — R05.1 ACUTE COUGH: ICD-10-CM

## 2023-05-14 PROCEDURE — 99421 OL DIG E/M SVC 5-10 MIN: CPT | Performed by: FAMILY MEDICINE

## 2023-05-15 RX ORDER — AZITHROMYCIN 250 MG/1
TABLET, FILM COATED ORAL
Qty: 6 TABLET | Refills: 0 | Status: SHIPPED | OUTPATIENT
Start: 2023-05-15 | End: 2023-05-20

## 2023-06-18 ENCOUNTER — NURSE TRIAGE (OUTPATIENT)
Dept: NURSING | Facility: CLINIC | Age: 73
End: 2023-06-18
Payer: COMMERCIAL

## 2023-06-18 ENCOUNTER — VIRTUAL VISIT (OUTPATIENT)
Dept: URGENT CARE | Facility: CLINIC | Age: 73
End: 2023-06-18
Payer: COMMERCIAL

## 2023-06-18 DIAGNOSIS — R50.81 FEVER IN OTHER DISEASES: Primary | ICD-10-CM

## 2023-06-18 PROCEDURE — 99214 OFFICE O/P EST MOD 30 MIN: CPT | Mod: VID

## 2023-06-18 NOTE — PROGRESS NOTES
Amy is a 72 year old who is being evaluated via a billable video visit.      How would you like to obtain your AVS? MyChart  If the video visit is dropped, the invitation should be resent by: Text to cell phone: [phone]  Will anyone else be joining your video visit? No          Assessment & Plan     Fever in other diseases    PLAN:  - Symptomatic COVID-19 Virus (Coronavirus) by PCR Nose      [facility]  Virtual Urgent Care  [facility] VIRTUAL URGENT CARE    Subjective   Amy is a 72 year old, presenting for the following health issues:  COVID    HPI     Just returned from travel 4 days ago  Started feeling fatigue yesterday  Today worse  Fever 100.6, chills  No cold symptoms urinary sx  Hydrated  At home inconclusive at home      Objective         Vitals:  No vitals were obtained today due to virtual visit.    Physical Exam   GENERAL: alert and no distress  EYES: Eyes grossly normal to inspection.  No discharge or erythema, or obvious scleral/conjunctival abnormalities.  RESP: No audible wheeze, cough, or visible cyanosis.  No visible retractions or increased work of breathing.    SKIN: Visible skin clear.   PSYCH: Mentation appears normal      Video-Visit Details    Type of service:  Video Visit   Video Start Time: 4:30 PM  Video End Time:5:00 PM  Originating Location (pt. Location): Home    Distant Location (provider location):  Off-site  Platform used for Video Visit: BView

## 2023-06-18 NOTE — TELEPHONE ENCOUNTER
"Nurse Triage SBAR    Is this a 2nd Level Triage? NO    Situation: Patient calling with concerns about testing positive for Covid. Patient is unsure if one line means test is positive or negative. Patient states \"there should be a faint red line and I don't see a second one.\" Consent: not needed    Background: Got back from [place] Wed after 24 days of travel   Covid test at home appears negative, but patient is not sure.    Assessment:   T- 100.6  Fatigue   Low appetite  No chest tightness  No breathing difficulty      Protocol Recommended Disposition:   Call PCP Now (or telehealth)    Recommendation: Advised patient to schedule an evisit to set up PCR testing for Covid. Care advice given. Patient verbalized understanding and agreed with plan.     Alona Lambert RN [facility] 6/18/2023 3:00 PM    Reason for Disposition    [1] HIGH RISK for severe COVID complications (e.g., weak immune system, age > 64 years, obesity with BMI > 25, pregnant, chronic lung disease or other chronic medical condition) AND [2] COVID symptoms (e.g., cough, fever)  (Exceptions: Already seen by PCP and no new or worsening symptoms.)    Additional Information    Negative: SEVERE difficulty breathing (e.g., struggling for each breath, speaks in single words)    Negative: Difficult to awaken or acting confused (e.g., disoriented, slurred speech)    Negative: Bluish (or gray) lips or face now    Negative: Shock suspected (e.g., cold/pale/clammy skin, too weak to stand, low BP, rapid pulse)    Negative: Sounds like a life-threatening emergency to the triager    Negative: [1] Diagnosed or suspected COVID-19 AND [2] symptoms lasting 3 or more weeks    Negative: [1] COVID-19 exposure AND [2] no symptoms    Negative: COVID-19 vaccine reaction suspected (e.g., fever, headache, muscle aches) occurring 1 to 3 days after getting vaccine    Negative: COVID-19 vaccine, questions about    Negative: [1] Lives with someone known to have " influenza (flu test positive) AND [2] flu-like symptoms (e.g., cough, runny nose, sore throat, SOB; with or without fever)    Negative: [1] Adult with possible COVID-19 symptoms AND [2] triager concerned about severity of symptoms or other causes    Negative: COVID-19 and breastfeeding, questions about    Negative: SEVERE or constant chest pain or pressure  (Exception: Mild central chest pain, present only when coughing.)    Negative: MODERATE difficulty breathing (e.g., speaks in phrases, SOB even at rest, pulse 100-120)    Negative: [1] Headache AND [2] stiff neck (can't touch chin to chest)    Negative: Oxygen level (e.g., pulse oximetry) 90 percent or lower    Negative: Chest pain or pressure    Negative: Patient sounds very sick or weak to the triager    Negative: MILD difficulty breathing (e.g., minimal/no SOB at rest, SOB with walking, pulse <100)    Negative: Fever > 103 F (39.4 C)    Negative: [1] Fever > 101 F (38.3 C) AND [2] age > 60 years    Negative: [1] Fever > 100.0 F (37.8 C) AND [2] bedridden (e.g., nursing home patient, CVA, chronic illness, recovering from surgery)    Protocols used: CORONAVIRUS (COVID-19) DIAGNOSED OR [id number]

## 2023-06-19 ENCOUNTER — ALLIED HEALTH/NURSE VISIT (OUTPATIENT)
Dept: FAMILY MEDICINE | Facility: CLINIC | Age: 73
End: 2023-06-19
Payer: COMMERCIAL

## 2023-06-19 DIAGNOSIS — J06.9 VIRAL URI WITH COUGH: Primary | ICD-10-CM

## 2023-06-19 LAB — SARS-COV-2 RNA RESP QL NAA+PROBE: NEGATIVE

## 2023-06-19 PROCEDURE — 99207 PR NO CHARGE NURSE ONLY: CPT

## 2023-06-19 PROCEDURE — 87635 SARS-COV-2 COVID-19 AMP PRB: CPT | Mod: VID | Performed by: NURSE PRACTITIONER

## 2023-06-21 ENCOUNTER — MYC REFILL (OUTPATIENT)
Dept: FAMILY MEDICINE | Facility: CLINIC | Age: 73
End: 2023-06-21
Payer: COMMERCIAL

## 2023-06-21 RX ORDER — BUDESONIDE 3 MG/1
6 CAPSULE, COATED PELLETS ORAL EVERY MORNING
Status: CANCELLED | OUTPATIENT
Start: 2023-06-21

## 2023-06-22 ENCOUNTER — MYC MEDICAL ADVICE (OUTPATIENT)
Dept: GASTROENTEROLOGY | Facility: CLINIC | Age: 73
End: 2023-06-22
Payer: COMMERCIAL

## 2023-06-22 NOTE — TELEPHONE ENCOUNTER
We have never filled this for her before  Who has filled it in the past and what is her diagnosis for this medication?  I'm guessing it is microscopic colitis and it should be filled by KANIKA Mcintyre MD

## 2023-06-22 NOTE — TELEPHONE ENCOUNTER
Patient did confirm she takes this when travelling for microscopic colitis  Did advise patient she needs to request prescription from her GI provider  Patient verbalized understanding and will do this    Kamala MOLINAN, RN

## 2023-06-23 ENCOUNTER — ANCILLARY PROCEDURE (OUTPATIENT)
Dept: GENERAL RADIOLOGY | Facility: CLINIC | Age: 73
End: 2023-06-23
Attending: PHYSICIAN ASSISTANT
Payer: COMMERCIAL

## 2023-06-23 ENCOUNTER — OFFICE VISIT (OUTPATIENT)
Dept: ORTHOPEDICS | Facility: CLINIC | Age: 73
End: 2023-06-23
Payer: COMMERCIAL

## 2023-06-23 VITALS — BODY MASS INDEX: 21.51 KG/M2 | HEIGHT: 64 IN | WEIGHT: 126 LBS

## 2023-06-23 DIAGNOSIS — M17.11 PRIMARY OSTEOARTHRITIS OF RIGHT KNEE: ICD-10-CM

## 2023-06-23 DIAGNOSIS — M25.561 CHRONIC PAIN OF RIGHT KNEE: ICD-10-CM

## 2023-06-23 DIAGNOSIS — G89.29 CHRONIC PAIN OF RIGHT KNEE: ICD-10-CM

## 2023-06-23 DIAGNOSIS — M25.562 ACUTE PAIN OF LEFT KNEE: ICD-10-CM

## 2023-06-23 DIAGNOSIS — M22.2X1 PATELLOFEMORAL PAIN SYNDROME OF RIGHT KNEE: Primary | ICD-10-CM

## 2023-06-23 DIAGNOSIS — M25.572 ACUTE LEFT ANKLE PAIN: ICD-10-CM

## 2023-06-23 PROCEDURE — 73562 X-RAY EXAM OF KNEE 3: CPT | Mod: TC | Performed by: RADIOLOGY

## 2023-06-23 PROCEDURE — 73610 X-RAY EXAM OF ANKLE: CPT | Mod: TC | Performed by: RADIOLOGY

## 2023-06-23 PROCEDURE — 99204 OFFICE O/P NEW MOD 45 MIN: CPT | Performed by: PHYSICIAN ASSISTANT

## 2023-06-23 NOTE — PROGRESS NOTES
"CHIEF COMPLAINT:   Chief Complaint   Patient presents with     Right Knee - Pain   .    HISTORY OF PRESENT ILLNESS    Sara Keita is a 72 year old female seen for evaluation of ongoing right knee pain with no known injury.   Pain has been present for 2 months. In 1970's  Dislocated patella while in [place], treated in immobilizer and got better. Knee has been feeling more \"unstable\" the last 2 months, she just returned from a trip to [facility] and she bought and wore a knee sleeve exclusively. Knee felt good and continues to feel good now even with not wearing the brace. She has another trip to [place] planned for August and wanted to get checked out prior.    Left ankle- anterior skin scrape and small bruising. Patient denies knowledge of any injury. Looking for reassurance that it's not a tick bite, or anything more serious..    Pain severity: 0/10 in the knee, 1/10 in ankle    Numbness or tingling: No   Patient has tried:     NSAIDS: Yes      Physical Therapy: No      Activity modification: Yes      Bracing: Yes sleeve     Injections: No      Ice: Yes     Orthopaedic PMH: likely patella dislocation in 1970's in [place]    Other PMH:  has no past medical history of Arthritis, Cancer (H), Cerebral infarction (H), Congestive heart failure (H), COPD (chronic obstructive pulmonary disease) (H), Depressive disorder, Diabetes (H), Heart disease, History of blood transfusion, Hypertension, Thyroid disease, or Uncomplicated asthma.    Surgical Hx:  has a past surgical history that includes colonoscopy (2016) and ENT surgery (20 yr ago).    Medications:   Current Outpatient Medications:      alendronate (FOSAMAX) 70 MG tablet, Take 1 tablet (70 mg) by mouth every 7 days, Disp: 12 tablet, Rfl: 3     Ascorbic Acid (VITAMIN C) 500 MG CAPS, , Disp: , Rfl:      atorvastatin (LIPITOR) 20 MG tablet, Take 1 tablet (20 mg) by mouth daily, Disp: 100 tablet, Rfl: 3     bimatoprost (LATISSE) 0.03 % external opthalmic " "solution, Apply 1 drop topically At Bedtime, Disp: 5 mL, Rfl: 11     budesonide (ENTOCORT EC) 3 MG EC capsule, Take 6 mg by mouth every morning, Disp: , Rfl:      citalopram (CELEXA) 20 MG tablet, Take 1 tablet (20 mg) by mouth daily, Disp: 90 tablet, Rfl: 3     cyclobenzaprine (FLEXERIL) 10 MG tablet, Take 1 tablet (10 mg) by mouth 3 times daily as needed for muscle spasms, Disp: 20 tablet, Rfl: 1     triamcinolone (KENALOG) 0.1 % external cream, Apply topically 2 times daily, Disp: 30 g, Rfl: 3     Turmeric 500 MG TABS, , Disp: , Rfl:      Vitamin D, Cholecalciferol, 25 MCG (1000 UT) CAPS, , Disp: , Rfl:      zolpidem (AMBIEN) 5 MG tablet, Take 1 tablet (5 mg) by mouth nightly as needed for sleep, Disp: 30 tablet, Rfl: 0    Allergies: No Known Allergies    Social Hx: Retired. Enjoys travel   reports that she quit smoking about 50 years ago. Her smoking use included cigarettes. She started smoking about 57 years ago. She has a 7.00 pack-year smoking history. She has never used smokeless tobacco. She reports that she does not currently use alcohol. She reports that she does not use drugs.    Family Hx: family history includes Diabetes in her father.    REVIEW OF SYSTEMS: 10 point ROS neg other than the symptoms noted above in the HPI and PMH. Notables include  CONSTITUTIONAL:NEGATIVE for fever, chills, change in weight  INTEGUMENTARY/SKIN: NEGATIVE for worrisome rashes, moles or lesions  MUSCULOSKELETAL:See HPI above  NEURO: NEGATIVE for weakness, dizziness or paresthesias    PHYSICAL EXAM:  Ht 1.626 m (5' 4\")   Wt 57.2 kg (126 lb)   LMP  (LMP Unknown)   BMI 21.63 kg/m     GENERAL APPEARANCE: healthy, alert, no distress  SKIN: no suspicious lesions or rashes  NEURO: Normal strength and tone, mentation intact and speech normal  PSYCH:  mentation appears normal and affect normal/bright, not anxious  RESPIRATORY: No increased work of breathing.  HANDS: no clubbing, nail pitting or nodes.    BILATERAL LOWER " EXTREMITIES:  Gait: normal  Alignment: valgus  No gross deformities or masses.  No Quad atrophy, strength normal.  Intact sensation deep peroneal nerve, superficial peroneal nerve, med/lat tibial nerve, sural nerve, saphenous nerve  Intact EHL, EDL, TA, FHL, GS, quadriceps hamstrings and hip flexors  Toes warm and well perfused, brisk capillary refill. Palpable 2+ dp pulses.  Bilateral calf soft and nttp or squeeze.  No palpable popliteal lymphadenopathy.  DTRs: achilles 2+, patella 2+.  Edema: none  Hips with full, pain-free motion. No irritability with flexion, adduction, and internal rotation.    LEFT KNEE EXAM:    Skin: intact, no ecchymosis or erythema  Squat: 100 %, not limited by pain.     ROM: 0 extension to 135 flexion  Tight hamstrings on straight leg raise.  Effusion: none  Tender: NTTP med/lat joint line, anterior or posterior knee  McMurrays: negative    MCL: stable, and non-painful at both 0 and 30 degrees knee flexion  Varus stress: stable, and non-painful at both 0 and 30 degrees knee flexion  Lachmans: neg, firm endpoint  Posterior Drawer stable  Patellofemoral joint:                Apprehension: negative              Crepitations: minimal    RIGHT KNEE EXAM:    Skin: intact, no ecchymosis or erythema  Squat: 100 %, not limited by pain.     ROM: 0 extension to 135 flexion  Tight hamstrings on straight leg raise.  Effusion: none  Tender: lateral joint line  McMurrays: negative    MCL: stable, and non-painful at both 0 and 30 degrees knee flexion  Varus stress: stable, and non-painful at both 0 and 30 degrees knee flexion  Lachmans: neg, firm endpoint  Posterior Drawer stable  Patellofemoral joint:                Apprehension: negative              Crepitations: moderate - nonpainful    X-RAY:  3 views right knee from 6/23/2023 were reviewed in clinic today. IMPRESSION:  Mild tricompartmental degenerative changes. Subcentimeter  loose body posteriorly in the medial compartment. No knee  "joint  effusion. Normal patellar alignment.     3 view left ankle from 6/23/23: IMPRESSION: No fractures are evident. Normal glenohumeral alignment.  No ankle joint effusion.        Impression:   73 y/o female with right knee patella-femoral syndrome, lateral compartment osteoarthritis. Left ankle bruise and swelling    Plan:      * rest  * Activity modification - avoid impact activities or activities that aggravate symptoms.  * NSAIDS - regular use for inflammation ( twice daily or three times daily), with food, as long as no contra-indications Please discuss with pcp if needed  * ice, 15-20 minutes at a time several times a day or as needed.  *Elevate affected extremity above level of heart. \"Toes above nose\"  * Strengthening of quadriceps muscles  * Physical Therapy ordered for strengthening, stretching and range of motion exercises  * Tylenol as needed for pain  * ACE wrap applied to left ankle for compression and edema control. advised to get ankle sleeve if needed  * Exercise: low impact such as stationary bike, elliptical, pool.  * Injections: risks and perceived benefits of cortisone versus viscosupplementation injections discussed. Patient has no pain, but will consider for the future  * Bracing: bracing the knee may offer some relief of symptoms when worn. J-brace fitted and provided today     * Return to clinic as needed.      Del Amaya PA-C, CAQ-OS  Dept. of Orthopedic Surgery  [facility]        "

## 2023-06-23 NOTE — LETTER
"    6/23/2023         RE: Sara Keita  [address] [facility] [address]        Dear Colleague,    Thank you for referring your patient, Sara Keita, to the [facility]. Please see a copy of my visit note below.    CHIEF COMPLAINT:   Chief Complaint   Patient presents with     Right Knee - Pain   .    HISTORY OF PRESENT ILLNESS    Sara Keita is a 72 year old female seen for evaluation of ongoing right knee pain with no known injury.   Pain has been present for 2 months. In 1970's  Dislocated patella while in [place], treated in immobilizer and got better. Knee has been feeling more \"unstable\" the last 2 months, she just returned from a trip to [facility] and she bought and wore a knee sleeve exclusively. Knee felt good and continues to feel good now even with not wearing the brace. She has another trip to [place] planned for August and wanted to get checked out prior.    Left ankle- anterior skin scrape and small bruising. Patient denies knowledge of any injury. Looking for reassurance that it's not a tick bite, or anything more serious..    Pain severity: 0/10 in the knee, 1/10 in ankle    Numbness or tingling: No   Patient has tried:     NSAIDS: Yes      Physical Therapy: No      Activity modification: Yes      Bracing: Yes sleeve     Injections: No      Ice: Yes     Orthopaedic PMH: likely patella dislocation in 1970's in [place]    Other PMH:  has no past medical history of Arthritis, Cancer (H), Cerebral infarction (H), Congestive heart failure (H), COPD (chronic obstructive pulmonary disease) (H), Depressive disorder, Diabetes (H), Heart disease, History of blood transfusion, Hypertension, Thyroid disease, or Uncomplicated asthma.    Surgical Hx:  has a past surgical history that includes colonoscopy (2016) and ENT surgery (20 yr ago).    Medications:   Current Outpatient Medications:      alendronate (FOSAMAX) 70 MG tablet, Take 1 tablet (70 mg) by " "mouth every 7 days, Disp: 12 tablet, Rfl: 3     Ascorbic Acid (VITAMIN C) 500 MG CAPS, , Disp: , Rfl:      atorvastatin (LIPITOR) 20 MG tablet, Take 1 tablet (20 mg) by mouth daily, Disp: 100 tablet, Rfl: 3     bimatoprost (LATISSE) 0.03 % external opthalmic solution, Apply 1 drop topically At Bedtime, Disp: 5 mL, Rfl: 11     budesonide (ENTOCORT EC) 3 MG EC capsule, Take 6 mg by mouth every morning, Disp: , Rfl:      citalopram (CELEXA) 20 MG tablet, Take 1 tablet (20 mg) by mouth daily, Disp: 90 tablet, Rfl: 3     cyclobenzaprine (FLEXERIL) 10 MG tablet, Take 1 tablet (10 mg) by mouth 3 times daily as needed for muscle spasms, Disp: 20 tablet, Rfl: 1     triamcinolone (KENALOG) 0.1 % external cream, Apply topically 2 times daily, Disp: 30 g, Rfl: 3     Turmeric 500 MG TABS, , Disp: , Rfl:      Vitamin D, Cholecalciferol, 25 MCG (1000 UT) CAPS, , Disp: , Rfl:      zolpidem (AMBIEN) 5 MG tablet, Take 1 tablet (5 mg) by mouth nightly as needed for sleep, Disp: 30 tablet, Rfl: 0    Allergies: No Known Allergies    Social Hx: Retired. Enjoys travel   reports that she quit smoking about 50 years ago. Her smoking use included cigarettes. She started smoking about 57 years ago. She has a 7.00 pack-year smoking history. She has never used smokeless tobacco. She reports that she does not currently use alcohol. She reports that she does not use drugs.    Family Hx: family history includes Diabetes in her father.    REVIEW OF SYSTEMS: 10 point ROS neg other than the symptoms noted above in the HPI and PMH. Notables include  CONSTITUTIONAL:NEGATIVE for fever, chills, change in weight  INTEGUMENTARY/SKIN: NEGATIVE for worrisome rashes, moles or lesions  MUSCULOSKELETAL:See HPI above  NEURO: NEGATIVE for weakness, dizziness or paresthesias    PHYSICAL EXAM:  Ht 1.626 m (5' 4\")   Wt 57.2 kg (126 lb)   LMP  (LMP Unknown)   BMI 21.63 kg/m     GENERAL APPEARANCE: healthy, alert, no distress  SKIN: no suspicious lesions or " rashes  NEURO: Normal strength and tone, mentation intact and speech normal  PSYCH:  mentation appears normal and affect normal/bright, not anxious  RESPIRATORY: No increased work of breathing.  HANDS: no clubbing, nail pitting or nodes.    BILATERAL LOWER EXTREMITIES:  Gait: normal  Alignment: valgus  No gross deformities or masses.  No Quad atrophy, strength normal.  Intact sensation deep peroneal nerve, superficial peroneal nerve, med/lat tibial nerve, sural nerve, saphenous nerve  Intact EHL, EDL, TA, FHL, GS, quadriceps hamstrings and hip flexors  Toes warm and well perfused, brisk capillary refill. Palpable 2+ dp pulses.  Bilateral calf soft and nttp or squeeze.  No palpable popliteal lymphadenopathy.  DTRs: achilles 2+, patella 2+.  Edema: none  Hips with full, pain-free motion. No irritability with flexion, adduction, and internal rotation.    LEFT KNEE EXAM:    Skin: intact, no ecchymosis or erythema  Squat: 100 %, not limited by pain.     ROM: 0 extension to 135 flexion  Tight hamstrings on straight leg raise.  Effusion: none  Tender: NTTP med/lat joint line, anterior or posterior knee  McMurrays: negative    MCL: stable, and non-painful at both 0 and 30 degrees knee flexion  Varus stress: stable, and non-painful at both 0 and 30 degrees knee flexion  Lachmans: neg, firm endpoint  Posterior Drawer stable  Patellofemoral joint:                Apprehension: negative              Crepitations: minimal    RIGHT KNEE EXAM:    Skin: intact, no ecchymosis or erythema  Squat: 100 %, not limited by pain.     ROM: 0 extension to 135 flexion  Tight hamstrings on straight leg raise.  Effusion: none  Tender: lateral joint line  McMurrays: negative    MCL: stable, and non-painful at both 0 and 30 degrees knee flexion  Varus stress: stable, and non-painful at both 0 and 30 degrees knee flexion  Lachmans: neg, firm endpoint  Posterior Drawer stable  Patellofemoral joint:                Apprehension:  "negative              Crepitations: moderate - nonpainful    X-RAY:  3 views right knee from 6/23/2023 were reviewed in clinic today. IMPRESSION:  Mild tricompartmental degenerative changes. Subcentimeter  loose body posteriorly in the medial compartment. No knee joint  effusion. Normal patellar alignment.     3 view left ankle from 6/23/23: IMPRESSION: No fractures are evident. Normal glenohumeral alignment.  No ankle joint effusion.        Impression:   71 y/o female with right knee patella-femoral syndrome, lateral compartment osteoarthritis. Left ankle bruise and swelling    Plan:      * rest  * Activity modification - avoid impact activities or activities that aggravate symptoms.  * NSAIDS - regular use for inflammation ( twice daily or three times daily), with food, as long as no contra-indications Please discuss with pcp if needed  * ice, 15-20 minutes at a time several times a day or as needed.  *Elevate affected extremity above level of heart. \"Toes above nose\"  * Strengthening of quadriceps muscles  * Physical Therapy ordered for strengthening, stretching and range of motion exercises  * Tylenol as needed for pain  * ACE wrap applied to left ankle for compression and edema control. advised to get ankle sleeve if needed  * Exercise: low impact such as stationary bike, elliptical, pool.  * Injections: risks and perceived benefits of cortisone versus viscosupplementation injections discussed. Patient has no pain, but will consider for the future  * Bracing: bracing the knee may offer some relief of symptoms when worn. J-brace fitted and provided today     * Return to clinic as needed.      Del Amaya PA-C, CAQ-OS  Dept. of Orthopedic Surgery  [facility]            Again, thank you for allowing me to participate in the care of your patient.        Sincerely,        GREGORIO Myles    "

## 2023-06-26 DIAGNOSIS — K52.831 COLLAGENOUS COLITIS: Primary | ICD-10-CM

## 2023-06-26 RX ORDER — BUDESONIDE 3 MG/1
CAPSULE, COATED PELLETS ORAL
Qty: 45 CAPSULE | Refills: 1 | Status: SHIPPED | OUTPATIENT
Start: 2023-06-26 | End: 2023-09-29

## 2023-06-26 ASSESSMENT — ACTIVITIES OF DAILY LIVING (ADL)
AS_A_RESULT_OF_YOUR_KNEE_INJURY,_HOW_WOULD_YOU_RATE_YOUR_CURRENT_LEVEL_OF_DAILY_ACTIVITY?: NORMAL
GO UP STAIRS: ACTIVITY IS NOT DIFFICULT
GO DOWN STAIRS: ACTIVITY IS NOT DIFFICULT
RISE FROM A CHAIR: ACTIVITY IS NOT DIFFICULT
GO UP STAIRS: ACTIVITY IS NOT DIFFICULT
GO DOWN STAIRS: ACTIVITY IS NOT DIFFICULT
HOW_WOULD_YOU_RATE_THE_OVERALL_FUNCTION_OF_YOUR_KNEE_DURING_YOUR_USUAL_DAILY_ACTIVITIES?: NORMAL
GIVING WAY, BUCKLING OR SHIFTING OF KNEE: I HAVE THE SYMPTOM BUT IT DOES NOT AFFECT MY ACTIVITY
KNEE_ACTIVITY_OF_DAILY_LIVING_SCORE: 94.29
PAIN: I DO NOT HAVE THE SYMPTOM
PAIN: I DO NOT HAVE THE SYMPTOM
SIT WITH YOUR KNEE BENT: ACTIVITY IS NOT DIFFICULT
AS_A_RESULT_OF_YOUR_KNEE_INJURY,_HOW_WOULD_YOU_RATE_YOUR_CURRENT_LEVEL_OF_DAILY_ACTIVITY?: NORMAL
KNEEL ON THE FRONT OF YOUR KNEE: ACTIVITY IS NOT DIFFICULT
PLEASE_INDICATE_YOR_PRIMARY_REASON_FOR_REFERRAL_TO_THERAPY:: KNEE
GIVING WAY, BUCKLING OR SHIFTING OF KNEE: I HAVE THE SYMPTOM BUT IT DOES NOT AFFECT MY ACTIVITY
WALK: ACTIVITY IS NOT DIFFICULT
SQUAT: ACTIVITY IS SOMEWHAT DIFFICULT
SIT WITH YOUR KNEE BENT: ACTIVITY IS NOT DIFFICULT
KNEEL ON THE FRONT OF YOUR KNEE: ACTIVITY IS NOT DIFFICULT
KNEE_ACTIVITY_OF_DAILY_LIVING_SUM: 66
WEAKNESS: I HAVE THE SYMPTOM BUT IT DOES NOT AFFECT MY ACTIVITY
STAND: ACTIVITY IS NOT DIFFICULT
SWELLING: I DO NOT HAVE THE SYMPTOM
SWELLING: I DO NOT HAVE THE SYMPTOM
RAW_SCORE: 66
SQUAT: ACTIVITY IS SOMEWHAT DIFFICULT
LIMPING: I DO NOT HAVE THE SYMPTOM
RISE FROM A CHAIR: ACTIVITY IS NOT DIFFICULT
WALK: ACTIVITY IS NOT DIFFICULT
STIFFNESS: I DO NOT HAVE THE SYMPTOM
STAND: ACTIVITY IS NOT DIFFICULT
WEAKNESS: I HAVE THE SYMPTOM BUT IT DOES NOT AFFECT MY ACTIVITY
LIMPING: I DO NOT HAVE THE SYMPTOM
STIFFNESS: I DO NOT HAVE THE SYMPTOM
HOW_WOULD_YOU_RATE_THE_OVERALL_FUNCTION_OF_YOUR_KNEE_DURING_YOUR_USUAL_DAILY_ACTIVITIES?: NORMAL

## 2023-06-27 ENCOUNTER — THERAPY VISIT (OUTPATIENT)
Dept: PHYSICAL THERAPY | Facility: CLINIC | Age: 73
End: 2023-06-27
Attending: PHYSICIAN ASSISTANT
Payer: COMMERCIAL

## 2023-06-27 DIAGNOSIS — M17.11 PRIMARY OSTEOARTHRITIS OF RIGHT KNEE: ICD-10-CM

## 2023-06-27 DIAGNOSIS — M25.561 CHRONIC PAIN OF RIGHT KNEE: ICD-10-CM

## 2023-06-27 DIAGNOSIS — M25.361 PATELLAR INSTABILITY OF RIGHT KNEE: ICD-10-CM

## 2023-06-27 DIAGNOSIS — G89.29 CHRONIC PAIN OF RIGHT KNEE: ICD-10-CM

## 2023-06-27 DIAGNOSIS — M22.2X1 PATELLOFEMORAL PAIN SYNDROME OF RIGHT KNEE: ICD-10-CM

## 2023-06-27 PROBLEM — M25.369 PATELLAR INSTABILITY: Status: ACTIVE | Noted: 2023-06-27

## 2023-06-27 PROCEDURE — 97161 PT EVAL LOW COMPLEX 20 MIN: CPT | Mod: GP

## 2023-06-27 PROCEDURE — 97110 THERAPEUTIC EXERCISES: CPT | Mod: GP

## 2023-06-27 NOTE — PROGRESS NOTES
PHYSICAL THERAPY EVALUATION  Type of Visit: Evaluation    See electronic medical record for Abuse and Falls Screening details.    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  Has upcoming trip in August she would like to prepare for.  She reports she avoided twisting or lateral movements on the R knee since the injury.  Has compression sleeve, but thinks the compression bothered her knee.   Has found more success with nylon sleeve.  Always feels the instability to the outside on left knee, subluxes per pt report.  Pt reports linear movements don't bother, but change of direction is the primary aggravating factor.  Since being home for the last month.  She states she is painfree, but the instability is what concerns her.    Patient enjoys advanced yoga classes, as well as [employer] strength machine workouts, and staying active in general.      Presenting condition or subjective complaint: Not worry about kneecap popping out  Date of onset: 06/23/23 (MD referral date)    Relevant medical history:     Dates & types of surgery:      Prior diagnostic imaging/testing results: X-ray   X-ray 6/23/23:     Mild tricompartmental degenerative changes. Subcentimeter  loose body posteriorly in the medial compartment. No knee joint  effusion. Normal patellar alignment.  Prior therapy history for the same diagnosis, illness or injury: No      Prior Level of Function   Transfers: Independent  Ambulation: Independent  ADL: Independent  IADL: Driving, Finances, Housekeeping, Laundry, Meal preparation, Medication management, Work, Yard work    Living Environment  Social support: Alone   Type of home: House   Stairs to enter the home: No       Ramp: No   Stairs inside the home: No       Help at home: None  Equipment owned:       Employment: No    Hobbies/Interests:  "Hiking,walking,travel    Patient goals for therapy: Not worry    Pain assessment:      Objective   KNEE EVALUATION  PAIN: Pain Level at Rest: 0/10  INTEGUMENTARY (edema, incisions): no edema/swelling, or effusion identified  POSTURE:   GAIT:  Weightbearing Status:   Assistive Device(s):   Gait Deviations: WFL  BALANCE/PROPRIOCEPTION: Single Leg Stance Eyes Open (seconds): 3\" bilaterally with fingertip support  WEIGHTBEARING ALIGNMENT:   NON-WEIGHTBEARING ALIGNMENT: trace lateral tilt of patella's bilaterally; no j-sign identified  ROM:   (Degrees) Left AROM Left PROM  Right AROM Right PROM   Knee Flexion WNL WNL WNL WNL   Knee Extension WNL WNL WNL WNL   Pain:   End feel:     STRENGTH:  Quad setting, LAQ, SLR all symmetric and WNL bilaterally.  Does note 'click' and unsteadiness in last 10 degrees knee extension actively on R    MMT - knee extension and flexion 5/5 bilaterally and painfree  MMT - hip abduction 3/5 bilaterally, hip ER 4+/5 bilaterally, hip extension 4/5 bilaterally    Squat - significant hip adduction and internal rotation present  - painfree        FLEXIBILITY: WNL for hamstrings, glutes  SPECIAL TESTS:   FUNCTIONAL TESTS:   PALPATION:   JOINT MOBILITY: patellar glides WNL all directions except for lateral glide on R which was noted to have mild increase in mobility, no apprehension with lateral glide on R  though.    Assessment & Plan   CLINICAL IMPRESSIONS   Medical Diagnosis: R patellar instability    Treatment Diagnosis: R patellar instability   Impression/Assessment: Patient is a 72 year old female with R patellar instabiltiy complaints.  The following significant findings have been identified: Pain, Decreased strength, Impaired balance, Decreased proprioception, Impaired muscle performance, Decreased activity tolerance and Instability. These impairments interfere with their ability to perform self care tasks, work tasks, recreational activities, household chores, driving , household mobility " "and community mobility as compared to previous level of function.     Clinical Decision Making (Complexity):   Clinical Presentation: Stable/Uncomplicated  Clinical Presentation Rationale: based on medical and personal factors listed in PT evaluation  Clinical Decision Making (Complexity): Low complexity    PLAN OF CARE  Treatment Interventions:  Interventions: Manual Therapy, Neuromuscular Re-education, Therapeutic Activity, Therapeutic Exercise    Long Term Goals     PT Goal 1  Goal Identifier: squat  Goal Description: squat to 90 degrees knee flexion with nil hip add/IR and painfree x10 reps  Rationale: to maximize safety and independence with performance of ADLs and functional tasks;to maximize safety and independence within the home;to maximize safety and independence within the community;to maximize safety and independence with transportation;to maximize safety and independence with self cares (to improve squats/stairs performance and minimize patellar aggravation)  Target Date: 07/25/23  PT Goal 2  Goal Identifier: Single leg stepdown  Goal Description: lateral stepdown on 4\" box without UE support x10 reps with minimal hip add/IR present and painfree  Rationale: to maximize safety and independence with performance of ADLs and functional tasks;to maximize safety and independence within the home;to maximize safety and independence within the community;to maximize safety and independence with transportation;to maximize safety and independence with self cares (to improve squats/stairs performance and minimize patellar aggravation)  Target Date: 08/22/23      Frequency of Treatment: 2x/month  Duration of Treatment: 2 months    Recommended Referrals to Other Professionals: Physical Therapy  Education Assessment:   Education Comments: PT role, POC, treatment rationale    Risks and benefits of evaluation/treatment have been explained.   Patient/Family/caregiver agrees with Plan of Care.     Evaluation Time:     PT " Catherine, Low Complexity Minutes (44141): 20      Signing Clinician: Jaspreet Wakefield PT      [facility]                                                                                   OUTPATIENT PHYSICAL THERAPY      PLAN OF TREATMENT FOR OUTPATIENT REHABILITATION   Patient's Last Name, First Name, Sara Jacob YOB: 1950   Provider's Name   [facility]   Medical Record No.  [id number]     Onset Date: 06/23/23 (MD referral date)  Start of Care Date: 06/27/23     Medical Diagnosis:  R patellar instability      PT Treatment Diagnosis:  R patellar instability Plan of Treatment  Frequency/Duration: 2x/month/ 2 months    Certification date from 06/27/23 to 09/25/23         See note for plan of treatment details and functional goals     Jaspreet Wakefield, PT                         I CERTIFY THE NEED FOR THESE SERVICES FURNISHED UNDER        THIS PLAN OF TREATMENT AND WHILE UNDER MY CARE     (Physician attestation of this document indicates review and certification of the therapy plan).                  Referring Provider:  Del Amaya      Initial Assessment  See Epic Evaluation- Start of Care Date: 06/27/23

## 2023-06-28 ENCOUNTER — OFFICE VISIT (OUTPATIENT)
Dept: DERMATOLOGY | Facility: CLINIC | Age: 73
End: 2023-06-28
Payer: COMMERCIAL

## 2023-06-28 DIAGNOSIS — D22.9 MULTIPLE BENIGN NEVI: ICD-10-CM

## 2023-06-28 DIAGNOSIS — D18.01 CHERRY ANGIOMA: ICD-10-CM

## 2023-06-28 DIAGNOSIS — L81.4 LENTIGO: ICD-10-CM

## 2023-06-28 DIAGNOSIS — L82.1 SEBORRHEIC KERATOSIS: Primary | ICD-10-CM

## 2023-06-28 DIAGNOSIS — Z85.89 HISTORY OF SQUAMOUS CELL CARCINOMA: ICD-10-CM

## 2023-06-28 DIAGNOSIS — L82.0 INFLAMED SEBORRHEIC KERATOSIS: ICD-10-CM

## 2023-06-28 PROCEDURE — 17110 DESTRUCTION B9 LES UP TO 14: CPT | Performed by: PHYSICIAN ASSISTANT

## 2023-06-28 PROCEDURE — 99213 OFFICE O/P EST LOW 20 MIN: CPT | Mod: 25 | Performed by: PHYSICIAN ASSISTANT

## 2023-06-28 RX ORDER — MULTIVITAMIN WITH IRON
1 TABLET ORAL DAILY
COMMUNITY
End: 2023-11-28

## 2023-06-28 NOTE — LETTER
2023         RE: Sara Keita  [address] [facility] [address]        Dear Colleague,    Thank you for referring your patient, Sara Keita, to the [facility]. Please see a copy of my visit note below.    Sara Keita is an extremely pleasant 72 year old year old female patient here today for skin check. She has SCC removed from left posterior thigh in 2019. She notes some rough areas that are getting irritated. No pain or bleeding.  Patient has no other skin complaints today.  Remainder of the HPI, Meds, PMH, Allergies, FH, and SH was reviewed in chart.    Pertinent Hx:   History of SCC on left thigh 2019  No past medical history on file.    Past Surgical History:   Procedure Laterality Date     COLONOSCOPY  2016    Collagenous colitis     ENT SURGERY  20 yr ago    For migraines        Family History   Problem Relation Age of Onset     Diabetes Father         Adult onset at age 86     Glaucoma No family hx of      Macular Degeneration No family hx of        Social History     Socioeconomic History     Marital status: Single     Spouse name: Not on file     Number of children: Not on file     Years of education: Not on file     Highest education level: Not on file   Occupational History     Not on file   Tobacco Use     Smoking status: Former     Packs/day: 1.00     Years: 7.00     Pack years: 7.00     Types: Cigarettes     Start date: 1966     Quit date: 1973     Years since quittin.5     Smokeless tobacco: Never     Tobacco comments:     I smoked from  to    Vaping Use     Vaping Use: Never used   Substance and Sexual Activity     Alcohol use: Not Currently     Drug use: Never     Sexual activity: Not Currently     Partners: Male     Birth control/protection: None   Other Topics Concern     Parent/sibling w/ CABG, MI or angioplasty before 65F 55M? No   Social History Narrative     Not on file     Social Determinants of Health     Financial  Resource Strain: Not on file   Food Insecurity: Not on file   Transportation Needs: Not on file   Physical Activity: Not on file   Stress: Not on file   Social Connections: Not on file   Intimate Partner Violence: Not on file   Housing Stability: Not on file       Outpatient Encounter Medications as of 6/28/2023   Medication Sig Dispense Refill     magnesium 250 MG tablet Take 1 tablet by mouth daily Daily multi  vitamin with magnesium       triamcinolone (KENALOG) 0.1 % external cream Apply topically 2 times daily 30 g 3     alendronate (FOSAMAX) 70 MG tablet Take 1 tablet (70 mg) by mouth every 7 days 12 tablet 3     Ascorbic Acid (VITAMIN C) 500 MG CAPS        atorvastatin (LIPITOR) 20 MG tablet Take 1 tablet (20 mg) by mouth daily 100 tablet 3     bimatoprost (LATISSE) 0.03 % external opthalmic solution Apply 1 drop topically At Bedtime 5 mL 11     budesonide (ENTOCORT EC) 3 MG EC capsule Take 2 capsules (6 mg) by mouth every morning for 15 days, THEN 1 capsule (3 mg) every morning for 15 days. 45 capsule 1     citalopram (CELEXA) 20 MG tablet Take 1 tablet (20 mg) by mouth daily 90 tablet 3     cyclobenzaprine (FLEXERIL) 10 MG tablet Take 1 tablet (10 mg) by mouth 3 times daily as needed for muscle spasms 20 tablet 1     Turmeric 500 MG TABS        Vitamin D, Cholecalciferol, 25 MCG (1000 UT) CAPS        zolpidem (AMBIEN) 5 MG tablet Take 1 tablet (5 mg) by mouth nightly as needed for sleep 30 tablet 0     No facility-administered encounter medications on file as of 6/28/2023.             O:   NAD, WDWN, Alert & Oriented, Mood & Affect wnl, Vitals stable   Here today alone   LMP  (LMP Unknown)    General appearance normal   Vitals stable   Alert, oriented and in no acute distress      Brown stuck papules on face   Stuck on papules and brown macules on trunk and ext   Red papules on trunk  Brown papules and macules with regular pigment network and borders on torso and extremities     The remainder of skin exam  is normal       Eyes: Conjunctivae/lids:Normal     ENT: Lips: normal    MSK:Normal    Cardiovascular: peripheral edema none    Pulm: Breathing Normal    Neuro/Psych: Orientation:Alert and Orientedx3 ; Mood/Affect:normal   A/P:  1. Inflamed seborrheic keratosis on forehead and left parietal scalp, back x 2, neck x 2   LN2:  Treated with LN2 for 5s for 1-2 cycles. Warned risks of blistering, pain, pigment change, scarring, and incomplete resolution.  Advised patient to return if lesions do not completely resolve.  Wound care sheet given.  2.Seborrheic keratosis, lentigo, angioma, benign nevi, history of SCC  BENIGN LESIONS DISCUSSED WITH PATIENT:  I discussed the specifics of tumor, prognosis, and genetics of benign lesions.  I explained that treatment of these lesions would be purely cosmetic and not medically neccessary.  I discussed with patient different removal options including excision, cautery and /or laser.      Nature and genetics of benign skin lesions dicussed with patient.  Signs and Symptoms of skin cancer discussed with patient.  ABCDEs of melanoma reviewed with patient.  Patient encouraged to perform monthly skin exams.  UV precautions reviewed with patient.  Risks of non-melanoma skin cancer discussed with patient   Return to clinic in one year or sooner if needed.       Again, thank you for allowing me to participate in the care of your patient.        Sincerely,        Lynne Odom PA-C

## 2023-06-28 NOTE — PROGRESS NOTES
Sara Keita is an extremely pleasant 72 year old year old female patient here today for skin check. She has SCC removed from left posterior thigh in 2019. She notes some rough areas that are getting irritated. No pain or bleeding.  Patient has no other skin complaints today.  Remainder of the HPI, Meds, PMH, Allergies, FH, and SH was reviewed in chart.    Pertinent Hx:   History of SCC on left thigh 2019  No past medical history on file.    Past Surgical History:   Procedure Laterality Date     COLONOSCOPY  2016    Collagenous colitis     ENT SURGERY  20 yr ago    For migraines        Family History   Problem Relation Age of Onset     Diabetes Father         Adult onset at age 86     Glaucoma No family hx of      Macular Degeneration No family hx of        Social History     Socioeconomic History     Marital status: Single     Spouse name: Not on file     Number of children: Not on file     Years of education: Not on file     Highest education level: Not on file   Occupational History     Not on file   Tobacco Use     Smoking status: Former     Packs/day: 1.00     Years: 7.00     Pack years: 7.00     Types: Cigarettes     Start date: 1966     Quit date: 1973     Years since quittin.5     Smokeless tobacco: Never     Tobacco comments:     I smoked from  to    Vaping Use     Vaping Use: Never used   Substance and Sexual Activity     Alcohol use: Not Currently     Drug use: Never     Sexual activity: Not Currently     Partners: Male     Birth control/protection: None   Other Topics Concern     Parent/sibling w/ CABG, MI or angioplasty before 65F 55M? No   Social History Narrative     Not on file     Social Determinants of Health     Financial Resource Strain: Not on file   Food Insecurity: Not on file   Transportation Needs: Not on file   Physical Activity: Not on file   Stress: Not on file   Social Connections: Not on file   Intimate Partner Violence: Not on file   Housing Stability: Not on  file       Outpatient Encounter Medications as of 6/28/2023   Medication Sig Dispense Refill     magnesium 250 MG tablet Take 1 tablet by mouth daily Daily multi  vitamin with magnesium       triamcinolone (KENALOG) 0.1 % external cream Apply topically 2 times daily 30 g 3     alendronate (FOSAMAX) 70 MG tablet Take 1 tablet (70 mg) by mouth every 7 days 12 tablet 3     Ascorbic Acid (VITAMIN C) 500 MG CAPS        atorvastatin (LIPITOR) 20 MG tablet Take 1 tablet (20 mg) by mouth daily 100 tablet 3     bimatoprost (LATISSE) 0.03 % external opthalmic solution Apply 1 drop topically At Bedtime 5 mL 11     budesonide (ENTOCORT EC) 3 MG EC capsule Take 2 capsules (6 mg) by mouth every morning for 15 days, THEN 1 capsule (3 mg) every morning for 15 days. 45 capsule 1     citalopram (CELEXA) 20 MG tablet Take 1 tablet (20 mg) by mouth daily 90 tablet 3     cyclobenzaprine (FLEXERIL) 10 MG tablet Take 1 tablet (10 mg) by mouth 3 times daily as needed for muscle spasms 20 tablet 1     Turmeric 500 MG TABS        Vitamin D, Cholecalciferol, 25 MCG (1000 UT) CAPS        zolpidem (AMBIEN) 5 MG tablet Take 1 tablet (5 mg) by mouth nightly as needed for sleep 30 tablet 0     No facility-administered encounter medications on file as of 6/28/2023.             O:   NAD, WDWN, Alert & Oriented, Mood & Affect wnl, Vitals stable   Here today alone   LMP  (LMP Unknown)    General appearance normal   Vitals stable   Alert, oriented and in no acute distress      Brown stuck papules on face   Stuck on papules and brown macules on trunk and ext   Red papules on trunk  Brown papules and macules with regular pigment network and borders on torso and extremities     The remainder of skin exam is normal       Eyes: Conjunctivae/lids:Normal     ENT: Lips: normal    MSK:Normal    Cardiovascular: peripheral edema none    Pulm: Breathing Normal    Neuro/Psych: Orientation:Alert and Orientedx3 ; Mood/Affect:normal   A/P:  1. Inflamed seborrheic  keratosis on forehead and left parietal scalp, back x 2, neck x 2   LN2:  Treated with LN2 for 5s for 1-2 cycles. Warned risks of blistering, pain, pigment change, scarring, and incomplete resolution.  Advised patient to return if lesions do not completely resolve.  Wound care sheet given.  2.Seborrheic keratosis, lentigo, angioma, benign nevi, history of SCC  BENIGN LESIONS DISCUSSED WITH PATIENT:  I discussed the specifics of tumor, prognosis, and genetics of benign lesions.  I explained that treatment of these lesions would be purely cosmetic and not medically neccessary.  I discussed with patient different removal options including excision, cautery and /or laser.      Nature and genetics of benign skin lesions dicussed with patient.  Signs and Symptoms of skin cancer discussed with patient.  ABCDEs of melanoma reviewed with patient.  Patient encouraged to perform monthly skin exams.  UV precautions reviewed with patient.  Risks of non-melanoma skin cancer discussed with patient   Return to clinic in one year or sooner if needed.

## 2023-07-03 DIAGNOSIS — K52.831 COLLAGENOUS COLITIS: ICD-10-CM

## 2023-07-03 RX ORDER — BUDESONIDE 3 MG/1
CAPSULE, COATED PELLETS ORAL
Qty: 45 CAPSULE | Refills: 1 | OUTPATIENT
Start: 2023-07-03 | End: 2023-08-02

## 2023-07-03 NOTE — TELEPHONE ENCOUNTER
Entocort EC (Budesonide) 3mg       Last Written Prescription Date:  6/26/2023  Last Fill Quantity: 90,   # refills: 1  Last Office Visit: 5/3/2022  Future Office visit:           MUST BE FAXED [phone]

## 2023-07-18 ASSESSMENT — PATIENT HEALTH QUESTIONNAIRE - PHQ9
10. IF YOU CHECKED OFF ANY PROBLEMS, HOW DIFFICULT HAVE THESE PROBLEMS MADE IT FOR YOU TO DO YOUR WORK, TAKE CARE OF THINGS AT HOME, OR GET ALONG WITH OTHER PEOPLE: SOMEWHAT DIFFICULT
SUM OF ALL RESPONSES TO PHQ QUESTIONS 1-9: 3
SUM OF ALL RESPONSES TO PHQ QUESTIONS 1-9: 3

## 2023-07-19 ENCOUNTER — OFFICE VISIT (OUTPATIENT)
Dept: FAMILY MEDICINE | Facility: CLINIC | Age: 73
End: 2023-07-19
Payer: COMMERCIAL

## 2023-07-19 VITALS
HEART RATE: 75 BPM | SYSTOLIC BLOOD PRESSURE: 101 MMHG | TEMPERATURE: 98.2 F | HEIGHT: 65 IN | BODY MASS INDEX: 21.09 KG/M2 | DIASTOLIC BLOOD PRESSURE: 66 MMHG | OXYGEN SATURATION: 96 % | RESPIRATION RATE: 18 BRPM | WEIGHT: 126.6 LBS

## 2023-07-19 DIAGNOSIS — R60.0 BILATERAL LOWER EXTREMITY EDEMA: Primary | ICD-10-CM

## 2023-07-19 PROCEDURE — 99213 OFFICE O/P EST LOW 20 MIN: CPT | Performed by: FAMILY MEDICINE

## 2023-07-19 ASSESSMENT — PATIENT HEALTH QUESTIONNAIRE - PHQ9
10. IF YOU CHECKED OFF ANY PROBLEMS, HOW DIFFICULT HAVE THESE PROBLEMS MADE IT FOR YOU TO DO YOUR WORK, TAKE CARE OF THINGS AT HOME, OR GET ALONG WITH OTHER PEOPLE: SOMEWHAT DIFFICULT
SUM OF ALL RESPONSES TO PHQ QUESTIONS 1-9: 3

## 2023-07-19 ASSESSMENT — PAIN SCALES - GENERAL: PAINLEVEL: MILD PAIN (2)

## 2023-07-19 NOTE — PROGRESS NOTES
Assessment & Plan     (R60.0) Bilateral lower extremity edema  (primary encounter diagnosis)  Comment: likely dependent edema from varicose veins in right, resolving tenosynovitis on the left  Plan: reassurance, recommend compression stockings for her upcoming flight  Ok to use elastic brace.                  See Patient Instructions    Leslie Lopez MD  [facility]   Amy is a 72 year old, presenting for the following health issues:  Edema        7/19/2023     6:57 AM   Additional Questions   Roomed by Mat     History of Present Illness       Reason for visit:  Swollen ankles  Symptom onset:  3-4 weeks ago  Symptoms include:  Swelling and pain  Symptom intensity:  Moderate  Symptom progression:  Improving  Had these symptoms before:  Yes  Has tried/received treatment for these symptoms:  Yes  Previous treatment was successful:  Yes  Prior treatment description:  Pt but the swelling is still there and the other ankle is now swollen  What makes it worse:  No  What makes it better:  Milton consumes 1 sweetened beverage(s) daily.She exercises with enough effort to increase her heart rate 9 or less minutes per day.  She exercises with enough effort to increase her heart rate 3 or less days per week.   She is taking medications regularly.    Today's PHQ-9         PHQ-9 Total Score: 3    PHQ-9 Q9 Thoughts of better off dead/self-harm past 2 weeks :   Not at all    How difficult have these problems made it for you to do your work, take care of things at home, or get along with other people: Somewhat difficult     Pt reports onset of left ankle swelling then pain, was seeing ortho, they felt it was from a shin bruise. Then saw PT for her knee who suggested she had an ankle sprain instead.  See notes 6/23/2023 and 6/27/2023.  Swelling has mostly resolved, now with slight swelling of right ankle and concerned that she will develop pain as she did in her left ankle.  She does not  "recall any injury to the left ankle nor the right.  Has stopped running and walking and that has helped.    Does have some varicose veins worse in right than left though not severe.  Concerned that she has a trip to [place] coming up that will include much walking.     Also reports she had a viral illness about 1 week prior onset of left ankle pain  No recent trips or risk factors for DVT           Review of Systems   Constitutional, HEENT, cardiovascular, pulmonary, gi and gu systems are negative, except as otherwise noted.      Objective    /66   Pulse 75   Temp 98.2  F (36.8  C) (Oral)   Resp 18   Ht 1.651 m (5' 5\")   Wt 57.4 kg (126 lb 9.6 oz)   LMP  (LMP Unknown)   SpO2 96%   BMI 21.07 kg/m    Body mass index is 21.07 kg/m .  Physical Exam   GENERAL: healthy, alert and no distress  MS: no gross musculoskeletal defects noted, slight edema bilateral ankles. Left lower anterior leg with area of dense subcu tissue non-tender, no mass or bruise  SKIN: no suspicious lesions or rashes  PSYCH: mentation appears normal, affect normal/bright                    "

## 2023-07-19 NOTE — PATIENT INSTRUCTIONS
Tenosynovitis of left ankle is likely the cause of pain and swelling. Likely from your viral stomach bug.    Right ankle swelling is likely dependent edema at end of day due to varicose veins.      Request vein specialist referral from Dr. Mcintyre when you return from your trip to get advice about if treatment of your veins could be covered by Medicare.

## 2023-07-26 ENCOUNTER — MYC MEDICAL ADVICE (OUTPATIENT)
Dept: FAMILY MEDICINE | Facility: CLINIC | Age: 73
End: 2023-07-26
Payer: COMMERCIAL

## 2023-07-31 ENCOUNTER — VIRTUAL VISIT (OUTPATIENT)
Dept: FAMILY MEDICINE | Facility: CLINIC | Age: 73
End: 2023-07-31
Payer: COMMERCIAL

## 2023-07-31 ENCOUNTER — THERAPY VISIT (OUTPATIENT)
Dept: PHYSICAL THERAPY | Facility: CLINIC | Age: 73
End: 2023-07-31
Payer: COMMERCIAL

## 2023-07-31 DIAGNOSIS — M25.361 PATELLAR INSTABILITY OF RIGHT KNEE: Primary | ICD-10-CM

## 2023-07-31 DIAGNOSIS — R25.2 LEG CRAMPS: Primary | ICD-10-CM

## 2023-07-31 PROCEDURE — 97110 THERAPEUTIC EXERCISES: CPT | Mod: GP

## 2023-07-31 PROCEDURE — 97112 NEUROMUSCULAR REEDUCATION: CPT | Mod: GP

## 2023-07-31 PROCEDURE — 99442 PR PHYSICIAN TELEPHONE EVALUATION 11-20 MIN: CPT | Mod: 93 | Performed by: NURSE PRACTITIONER

## 2023-07-31 RX ORDER — MULTIPLE VITAMINS W/ MINERALS TAB 9MG-400MCG
1 TAB ORAL DAILY
COMMUNITY

## 2023-07-31 NOTE — PROGRESS NOTES
Amy is a 72 year old who is being evaluated via a billable telephone visit.      What phone number would you like to be contacted at? [phone]  How would you like to obtain your AVS? Renae    Distant Location (provider location):  On-site    Assessment & Plan     Leg cramps    Ok to take MTV and magnesium OTC.  Wear compression stockings while traveling.  Follow up with PCP for further evaluation and lab work when back from vacation.             See Patient Instructions  Patient Instructions   Continue all current medications.  Follow up with PCP when back.      Our Clinic hours are:  Mondays    7:20 am - 7 pm  Tues -  Fri  7:20 am - 5 pm    Clinic Phone: [phone]    The clinic lab opens at 7:30 am Mon - Fri and appointments are required.    [facility] [place]  Ph. [phone]  Monday  8 am - 7pm  Tues - Fri 8 am - 5:30 pm         JHON Pineda CNP  [facility]    Mariah Reyes is a 72 year old, presenting for the following health issues:  Musculoskeletal Problem (Leg cramps )      HPI     She is going on another vacation in 2 days. Has been having some leg pain and cramps. She wants to know if she can take OTC magnesium.          Review of Systems   Constitutional, HEENT, cardiovascular, pulmonary, gi and gu systems are negative, except as otherwise noted.      Objective           Vitals:  No vitals were obtained today due to virtual visit.    Physical Exam   healthy, alert, and no distress  PSYCH: Alert and oriented times 3; coherent speech, normal   rate and volume, able to articulate logical thoughts, able   to abstract reason, no tangential thoughts, no hallucinations   or delusions  Her affect is normal  RESP: No cough, no audible wheezing, able to talk in full sentences  Remainder of exam unable to be completed due to telephone visits                Phone call duration: 11 minutes

## 2023-09-20 ENCOUNTER — TELEPHONE (OUTPATIENT)
Dept: GASTROENTEROLOGY | Facility: CLINIC | Age: 73
End: 2023-09-20
Payer: COMMERCIAL

## 2023-09-29 ENCOUNTER — MYC REFILL (OUTPATIENT)
Dept: GASTROENTEROLOGY | Facility: CLINIC | Age: 73
End: 2023-09-29
Payer: COMMERCIAL

## 2023-09-29 DIAGNOSIS — K52.831 COLLAGENOUS COLITIS: ICD-10-CM

## 2023-09-29 RX ORDER — BUDESONIDE 3 MG/1
CAPSULE, COATED PELLETS ORAL
Qty: 45 CAPSULE | Refills: 1 | Status: SHIPPED | OUTPATIENT
Start: 2023-09-29 | End: 2023-10-02

## 2023-09-29 NOTE — TELEPHONE ENCOUNTER
Routing refill request to provider for review/approval because:  Medication not on protocol.   Patient requesting 90 day supply     Cindy Barry Rn

## 2023-09-29 NOTE — TELEPHONE ENCOUNTER
[facility] Call Center    Phone Message    May a detailed message be left on voicemail: yes     Reason for Call: Other: RX for refill needs to be paper and mailed to patient, as she no longer uses [facility].  She uses [facility] in [place].   Please send the RX for budesonide (ENTOCORT EC) 3 MG EC capsule, 2 refills of 90 pills via paper to patient's home address.  Please follow up with patient - leave message when mailed.     Action Taken: Message routed to:  [facility]  Gastro Care Pool    Travel Screening: Not Applicable

## 2023-10-02 RX ORDER — BUDESONIDE 3 MG/1
CAPSULE, COATED PELLETS ORAL
Qty: 45 CAPSULE | Refills: 1 | Status: SHIPPED | OUTPATIENT
Start: 2023-10-02 | End: 2023-12-06

## 2023-10-02 NOTE — TELEPHONE ENCOUNTER
[facility]    Phone Message    May a detailed message be left on voicemail: yes     Reason for Call: Other: Dallin at [facility] needing clarification on prescription. Does this stop at 30 days, as a refill was noted. Please call pharmacy to advise.    Action Taken: Message routed to:  [facility] Gastro    Travel Screening: Not Applicable

## 2023-10-02 NOTE — TELEPHONE ENCOUNTER
New script pended, please review and sign.  Script should be local print as patient is no longer using [facility].  Please route encounter back to the pool so they can be mailed.     Cindy Barry Rn

## 2023-10-25 ENCOUNTER — OFFICE VISIT (OUTPATIENT)
Dept: OPTOMETRY | Facility: CLINIC | Age: 73
End: 2023-10-25
Payer: COMMERCIAL

## 2023-10-25 DIAGNOSIS — H52.4 PRESBYOPIA: ICD-10-CM

## 2023-10-25 DIAGNOSIS — H25.813 COMBINED FORMS OF AGE-RELATED CATARACT OF BOTH EYES: ICD-10-CM

## 2023-10-25 DIAGNOSIS — H52.223 REGULAR ASTIGMATISM OF BOTH EYES: ICD-10-CM

## 2023-10-25 DIAGNOSIS — H52.13 MYOPIA OF BOTH EYES: ICD-10-CM

## 2023-10-25 DIAGNOSIS — Z01.01 ENCOUNTER FOR EXAMINATION OF EYES AND VISION WITH ABNORMAL FINDINGS: Primary | ICD-10-CM

## 2023-10-25 PROCEDURE — 92015 DETERMINE REFRACTIVE STATE: CPT | Performed by: OPTOMETRIST

## 2023-10-25 PROCEDURE — 92004 COMPRE OPH EXAM NEW PT 1/>: CPT | Performed by: OPTOMETRIST

## 2023-10-25 ASSESSMENT — REFRACTION_MANIFEST
OS_CYLINDER: +0.75
OS_ADD: +2.50
OS_AXIS: 135
OD_ADD: +2.50
OD_CYLINDER: +0.75
OD_AXIS: 026
OS_SPHERE: -2.75
OD_SPHERE: -2.50

## 2023-10-25 ASSESSMENT — REFRACTION_CURRENTRX
OS_SPHERE: -2.00
OD_BRAND: COOPER BIOFINITY BC 8.6, D 14.0
OS_CYLINDER: -0.75
OS_AXIS: 060
OS_BRAND: COOPER BIOFINITY TORIC BC 8.7, D 14.5
OD_SPHERE: -2.25

## 2023-10-25 ASSESSMENT — REFRACTION_WEARINGRX
OS_CYLINDER: SPHERE
OD_ADD: +3.00
OS_CYLINDER: +0.25
OD_CYLINDER: SPHERE
SPECS_TYPE: PAL
OS_SPHERE: +3.00
OS_AXIS: 144
OD_SPHERE: -3.25
OD_SPHERE: +3.00
SPECS_TYPE: OTC CHEATERS
OD_AXIS: 016
OS_SPHERE: -2.75
OD_CYLINDER: +1.00
OS_ADD: +3.00

## 2023-10-25 ASSESSMENT — SLIT LAMP EXAM - LIDS
COMMENTS: 2+ MEIBOMIAN GLAND DYSFUNCTION
COMMENTS: 2+ MEIBOMIAN GLAND DYSFUNCTION

## 2023-10-25 ASSESSMENT — TONOMETRY
OS_IOP_MMHG: 14
OD_IOP_MMHG: 14
IOP_METHOD: APPLANATION

## 2023-10-25 ASSESSMENT — CONF VISUAL FIELD
OD_INFERIOR_NASAL_RESTRICTION: 0
METHOD: COUNTING FINGERS
OS_INFERIOR_TEMPORAL_RESTRICTION: 0
OS_NORMAL: 1
OS_SUPERIOR_TEMPORAL_RESTRICTION: 0
OD_INFERIOR_TEMPORAL_RESTRICTION: 0
OS_SUPERIOR_NASAL_RESTRICTION: 0
OD_SUPERIOR_NASAL_RESTRICTION: 0
OD_SUPERIOR_TEMPORAL_RESTRICTION: 0
OS_INFERIOR_NASAL_RESTRICTION: 0
OD_NORMAL: 1

## 2023-10-25 ASSESSMENT — VISUAL ACUITY
CORRECTION_TYPE: CONTACTS
METHOD: SNELLEN - LINEAR
OD_CC: 20/40
OD_CC: 20/30-2
OS_CC: 20/40
OS_CC: 20/30-1
OS_CC+: -1

## 2023-10-25 ASSESSMENT — EXTERNAL EXAM - LEFT EYE: OS_EXAM: 1+ BROW PTOSIS

## 2023-10-25 ASSESSMENT — CUP TO DISC RATIO
OS_RATIO: 0.6
OD_RATIO: 0.4

## 2023-10-25 ASSESSMENT — EXTERNAL EXAM - RIGHT EYE: OD_EXAM: 1+ BROW PTOSIS

## 2023-10-25 NOTE — PATIENT INSTRUCTIONS
Continue with current contact lens prescription. Ok to wear lens in right eye only (monovision).     Updated glasses prescription provided today.     You have the start of mild cataracts.  You may notice some blurred vision or glare with night driving.  It is important that you wear good sunglasses to protect your eyes from the ultraviolet light from the sun.     Return in 1 year for a comprehensive eye exam, or sooner if needed.      The effects of the dilating drops last for 4- 6 hours.  You will be more sensitive to light and vision will be blurry up close.  Mydriatic sunglasses were given if needed.     Reynaldo Barron, OD  [address]. [place]  [address] [phone]

## 2023-10-25 NOTE — LETTER
10/25/2023         RE: Sara Keita  [address] [facility] [address]        Dear Colleague,    Thank you for referring your patient, Sara Keita, to the [facility]. Please see a copy of my visit note below.    Chief Complaint   Patient presents with     Annual Eye Exam     New Eval For Contact Lens     -Ocular migraines - have been getting them more frequently - has had 2 in last 5 days - lasting 5-10 mins      Previous contact lens wearer? Yes: Biofinity sph right eye, Biofinity Toric left eye   Comfort of contact lenses : Good   Satisfied with current lenses: No - has been having some issues with her left eye - was traveling in Aug and had to take her L lens out as it was bothering her and she liked the vision she had when she did that so she is wondering if she can get an Rx like that     -Current Rx good until 5/5/2024 so we discussed $75 fitting fee     Last Eye Exam: 2/7/2022   Dilated Previously: Yes, side effects of dilation explained today    What are you currently using to see?  Glasses (SVL distance), +3.00 readers, and contacts    Distance Vision Acuity: Noticed gradual change in both eyes    Near Vision Acuity: Satisfied with vision while reading  with readers over CL's     Eye Comfort: good  Do you use eye drops? : Yes: Lumify qPM, Refresh Plus PF few times/mo with CL's   Occupation or Hobbies: Retired       Ellyn Mancia  Optometry Assistant     Medical, surgical and family histories reviewed and updated 10/25/2023.       OBJECTIVE: See Ophthalmology exam    ASSESSMENT:    ICD-10-CM    1. Encounter for examination of eyes and vision with abnormal findings  Z01.01       2. Combined forms of age-related cataract of both eyes  H25.813       3. Myopia of both eyes  H52.13       4. Regular astigmatism of both eyes  H52.223       5. Presbyopia  H52.4          PLAN:     Patient Instructions   Continue with current contact lens prescription. Ok to wear lens in right  eye only (monovision).     Updated glasses prescription provided today.     You have the start of mild cataracts.  You may notice some blurred vision or glare with night driving.  It is important that you wear good sunglasses to protect your eyes from the ultraviolet light from the sun.     Return in 1 year for a comprehensive eye exam, or sooner if needed.      The effects of the dilating drops last for 4- 6 hours.  You will be more sensitive to light and vision will be blurry up close.  Mydriatic sunglasses were given if needed.     Reynaldo Barron, SHAQUILLE  [address] [place]  [address]    [phone]                 Again, thank you for allowing me to participate in the care of your patient.        Sincerely,        Reynaldo Barron OD

## 2023-10-25 NOTE — PROGRESS NOTES
Chief Complaint   Patient presents with    Annual Eye Exam    New Eval For Contact Lens     -Ocular migraines - have been getting them more frequently - has had 2 in last 5 days - lasting 5-10 mins      Previous contact lens wearer? Yes: Biofinity sph right eye, Biofinity Toric left eye   Comfort of contact lenses : Good   Satisfied with current lenses: No - has been having some issues with her left eye - was traveling in Aug and had to take her L lens out as it was bothering her and she liked the vision she had when she did that so she is wondering if she can get an Rx like that     -Current Rx good until 5/5/2024 so we discussed $75 fitting fee     Last Eye Exam: 2/7/2022   Dilated Previously: Yes, side effects of dilation explained today    What are you currently using to see?  Glasses (SVL distance), +3.00 readers, and contacts    Distance Vision Acuity: Noticed gradual change in both eyes    Near Vision Acuity: Satisfied with vision while reading  with readers over CL's     Eye Comfort: good  Do you use eye drops? : Yes: Lumify qPM, Refresh Plus PF few times/mo with CL's   Occupation or Hobbies: Retired       Ellyn Mancia  Optometry Assistant     Medical, surgical and family histories reviewed and updated 10/25/2023.       OBJECTIVE: See Ophthalmology exam    ASSESSMENT:    ICD-10-CM    1. Encounter for examination of eyes and vision with abnormal findings  Z01.01       2. Combined forms of age-related cataract of both eyes  H25.813       3. Myopia of both eyes  H52.13       4. Regular astigmatism of both eyes  H52.223       5. Presbyopia  H52.4          PLAN:     Patient Instructions   Continue with current contact lens prescription. Ok to wear lens in right eye only (monovision).     Updated glasses prescription provided today.     You have the start of mild cataracts.  You may notice some blurred vision or glare with night driving.  It is important that you wear good sunglasses to protect your eyes from the  ultraviolet light from the sun.     Return in 1 year for a comprehensive eye exam, or sooner if needed.      The effects of the dilating drops last for 4- 6 hours.  You will be more sensitive to light and vision will be blurry up close.  Mydriatic sunglasses were given if needed.     Reynaldo Barron, SHAQUILLE  [address]  [address]    [phone]

## 2023-11-10 DIAGNOSIS — M85.88 OSTEOPENIA OF LUMBAR SPINE: ICD-10-CM

## 2023-11-12 RX ORDER — ALENDRONATE SODIUM 70 MG/1
TABLET ORAL
Qty: 12 TABLET | Refills: 0 | Status: SHIPPED | OUTPATIENT
Start: 2023-11-12

## 2023-11-28 ENCOUNTER — VIRTUAL VISIT (OUTPATIENT)
Dept: FAMILY MEDICINE | Facility: CLINIC | Age: 73
End: 2023-11-28
Payer: COMMERCIAL

## 2023-11-28 DIAGNOSIS — J06.9 VIRAL URI WITH COUGH: Primary | ICD-10-CM

## 2023-11-28 PROCEDURE — 99213 OFFICE O/P EST LOW 20 MIN: CPT | Mod: VID | Performed by: FAMILY MEDICINE

## 2023-11-28 RX ORDER — CODEINE PHOSPHATE AND GUAIFENESIN 10; 100 MG/5ML; MG/5ML
1-2 SOLUTION ORAL EVERY 4 HOURS PRN
Qty: 118 ML | Refills: 0 | Status: SHIPPED | OUTPATIENT
Start: 2023-11-28

## 2023-11-28 NOTE — PROGRESS NOTES
"    Instructions Relayed to Patient by Virtual Roomer:     Patient is active on [employer]:   Relayed following to patient: \"It looks like you are active on [employer], are you able to join the visit this way? If not, do you need us to send you a link now or would you like your provider to send a link via text or email when they are ready to initiate the visit?\"    Reminded patient to ensure they were logged on to virtual visit by arrival time listed. Documented in appointment notes if patient had flexibility to initiate visit sooner than arrival time. If pediatric virtual visit, ensured pediatric patient along with parent/guardian will be present for video visit.     Patient offered the website [email or web address] and/or phone number to [employer] Help line: [phone]      Answers submitted by the patient for this visit:  General Questionnaire (Submitted on 11/26/2023)  Chief Complaint: Chronic problems general questions HPI Form  How many servings of fruits and vegetables do you eat daily?: 2-3  On average, how many sweetened beverages do you drink each day (Examples: soda, juice, sweet tea, etc.  Do NOT count diet or artificially sweetened beverages)?: 0  How many minutes a day do you exercise enough to make your heart beat faster?: 20 to 29  How many days a week do you exercise enough to make your heart beat faster?: 3 or less  How many days per week do you miss taking your medication?: 0  General Concern (Submitted on 11/26/2023)  Chief Complaint: Chronic problems general questions HPI Form  What is the reason for your visit today?: Laryngitis  When did your symptoms begin?: 1-3 days ago  What are your symptoms?: Gravel voice, no energy, occasionally a cough, or a sore throat  How would you describe these symptoms?: Moderate  Are your symptoms:: Staying the same  Have you had these symptoms before?: Pari Reyes is a 72 year old who is being evaluated via a billable video visit.      How would you like " to obtain your AVS? MyChart  If the video visit is dropped, the invitation should be resent by: Text to cell phone: [phone]  Will anyone else be joining your video visit? No          Assessment & Plan     (J06.9) Viral URI with cough  (primary encounter diagnosis)  Comment: She may be developing a sinus infection. This is day 7 of her illness.   Plan: amoxicillin-clavulanate (AUGMENTIN) 875-125 MG         tablet, guaiFENesin-codeine (ROBITUSSIN AC)         100-10 MG/5ML solution        Return in about 10 days (around 12/8/2023) for recheck if symptoms fail to resolve by then, in the office.        15 minutes spent by me on the date of the encounter doing chart review, patient visit, and documentation       Elfego Sullivan MD  [facility]    Mariah Reyes is a 72 year old, presenting for the following health issues:  Laryngitis        11/28/2023     9:34 AM   Additional Questions   Roomed by tshia       History of Present Illness       Reason for visit:  Laryngitis  Symptom onset:  1-3 days ago  Symptoms include:  Gravel voice, no energy, occasionally a cough, or a sore throat  Symptom intensity:  Moderate  Symptom progression:  Staying the same  Had these symptoms before:  No    She eats 2-3 servings of fruits and vegetables daily.She consumes 0 sweetened beverage(s) daily.She exercises with enough effort to increase her heart rate 20 to 29 minutes per day.  She exercises with enough effort to increase her heart rate 3 or less days per week.   She is taking medications regularly.     Patient notes the first day she had any symptoms was 11/22. She reports the cough started 11/23 and has a sore throat that comes and goes. She denies any fever. She has not done a COVID-19 test yet.      Review of Systems         Objective           Vitals:  No vitals were obtained today due to virtual visit.    Physical Exam   GENERAL: Healthy, alert and no distress, occasional wet cough  EYES:  Eyes grossly normal to inspection.  No discharge or erythema, or obvious scleral/conjunctival abnormalities.  RESP: No audible wheeze, or visible cyanosis.  No visible retractions or increased work of breathing.    SKIN: Visible skin clear. No significant rash, abnormal pigmentation or lesions.  NEURO: Cranial nerves grossly intact.  Mentation and speech appropriate for age.  PSYCH: Mentation appears normal, affect normal/bright, judgement and insight intact, normal speech and appearance well-groomed.              Video-Visit Details    Type of service:  Video Visit   Video Start Time: 9:46 AM  Video End Time:9:54 AM    Originating Location (pt. Location): Home  Distant Location (provider location):  On-site  Platform used for Video Visit: [facility]    This document serves as a record of the services and decisions personally performed and made by Dr. Sullivan. It was created on his behalf by Sagar Guevara, a trained medical scribe. The creation of this document is based the provider's statements to the medical scribe.  Sagar Guevara,  10:01 AM

## 2023-12-04 ENCOUNTER — MYC MEDICAL ADVICE (OUTPATIENT)
Dept: FAMILY MEDICINE | Facility: CLINIC | Age: 73
End: 2023-12-04
Payer: COMMERCIAL

## 2023-12-06 ENCOUNTER — ANCILLARY PROCEDURE (OUTPATIENT)
Dept: GENERAL RADIOLOGY | Facility: CLINIC | Age: 73
End: 2023-12-06
Attending: NURSE PRACTITIONER
Payer: COMMERCIAL

## 2023-12-06 ENCOUNTER — OFFICE VISIT (OUTPATIENT)
Dept: URGENT CARE | Facility: URGENT CARE | Age: 73
End: 2023-12-06
Payer: COMMERCIAL

## 2023-12-06 VITALS
RESPIRATION RATE: 22 BRPM | TEMPERATURE: 98.5 F | SYSTOLIC BLOOD PRESSURE: 125 MMHG | BODY MASS INDEX: 21.13 KG/M2 | WEIGHT: 127 LBS | DIASTOLIC BLOOD PRESSURE: 82 MMHG | HEART RATE: 82 BPM | OXYGEN SATURATION: 93 %

## 2023-12-06 DIAGNOSIS — R05.1 ACUTE COUGH: Primary | ICD-10-CM

## 2023-12-06 DIAGNOSIS — R06.02 SOB (SHORTNESS OF BREATH): ICD-10-CM

## 2023-12-06 DIAGNOSIS — J20.9 ACUTE BRONCHITIS WITH SYMPTOMS GREATER THAN 10 DAYS: ICD-10-CM

## 2023-12-06 PROBLEM — R79.0 LOW IRON STORES: Status: RESOLVED | Noted: 2021-12-02 | Resolved: 2023-12-06

## 2023-12-06 PROCEDURE — 71046 X-RAY EXAM CHEST 2 VIEWS: CPT | Mod: TC | Performed by: RADIOLOGY

## 2023-12-06 PROCEDURE — 99213 OFFICE O/P EST LOW 20 MIN: CPT | Performed by: NURSE PRACTITIONER

## 2023-12-06 RX ORDER — AZITHROMYCIN 250 MG/1
TABLET, FILM COATED ORAL
Qty: 6 TABLET | Refills: 0 | Status: SHIPPED | OUTPATIENT
Start: 2023-12-06 | End: 2023-12-11

## 2023-12-06 RX ORDER — PREDNISONE 20 MG/1
TABLET ORAL
Qty: 20 TABLET | Refills: 0 | Status: SHIPPED | OUTPATIENT
Start: 2023-12-06

## 2023-12-06 NOTE — PROGRESS NOTES
Assessment & Plan     1. Acute cough  Negative chest xray per radiology read  - XR Chest 2 Views  - azithromycin (ZITHROMAX) 250 MG tablet; Take 2 tablets (500 mg) by mouth daily for 1 day, THEN 1 tablet (250 mg) daily for 4 days.  Dispense: 6 tablet; Refill: 0  - predniSONE (DELTASONE) 20 MG tablet; Take 3 tabs by mouth daily x 3 days, then 2 tabs daily x 3 days, then 1 tab daily x 3 days, then 1/2 tab daily x 3 days.  Dispense: 20 tablet; Refill: 0    2. SOB (shortness of breath)    - XR Chest 2 Views  - azithromycin (ZITHROMAX) 250 MG tablet; Take 2 tablets (500 mg) by mouth daily for 1 day, THEN 1 tablet (250 mg) daily for 4 days.  Dispense: 6 tablet; Refill: 0  - predniSONE (DELTASONE) 20 MG tablet; Take 3 tabs by mouth daily x 3 days, then 2 tabs daily x 3 days, then 1 tab daily x 3 days, then 1/2 tab daily x 3 days.  Dispense: 20 tablet; Refill: 0    3. Acute bronchitis with symptoms greater than 10 days  Recommend treatment with oral antibiotic due to length will take with food discussed probiotic as well   Prednisone to help with inflammation may continue otc cough suppressant.     Home care reviewed. Patient verbalized understanding; will monitor symptoms closely. Reviewed s/e to medications.   Follow up with primary care in 1 week if symptoms not improving.     Handout given from epic and reviewed.    - azithromycin (ZITHROMAX) 250 MG tablet; Take 2 tablets (500 mg) by mouth daily for 1 day, THEN 1 tablet (250 mg) daily for 4 days.  Dispense: 6 tablet; Refill: 0  - predniSONE (DELTASONE) 20 MG tablet; Take 3 tabs by mouth daily x 3 days, then 2 tabs daily x 3 days, then 1 tab daily x 3 days, then 1/2 tab daily x 3 days.  Dispense: 20 tablet; Refill: 0          JHON Torres [facility]    Mariah Reyes is a 73 year old female who presents to clinic today for the following health issues:  Chief Complaint   Patient presents with    Urgent Care    URI     Per  patient symptoms started on thanksgiving day did do an E-visit diagnosed with sinus infection given antibiotics but still having cough, sob , decreased sleeping and sweats feels it's getting worst      HPI      URI Adult    Onset of symptoms was 3 week(s) ago.  Course of illness is worsening.    Severity moderate  Current and Associated symptoms: cough - productive  Treatment measures tried include Tylenol/Ibuprofen, OTC Cough med, Fluids, and Rest.  Predisposing factors include None.  Recent augmentin for bacterial sinus completed symptoms of sinus improved    Review of Systems  Constitutional, HEENT, cardiovascular, pulmonary, gi and gu systems are negative, except as otherwise noted.      Objective    /82   Pulse 82   Temp 98.5  F (36.9  C) (Tympanic)   Resp 22   Wt 57.6 kg (127 lb)   LMP  (LMP Unknown)   SpO2 93%   BMI 21.13 kg/m    Physical Exam   GENERAL: alert and no distress  EYES: Eyes grossly normal to inspection, PERRL and conjunctivae and sclerae normal  HENT: ear canals and TM's normal, nose and mouth without ulcers or lesions  NECK: bilateral anterior cervical adenopathy, no asymmetry, masses, or scars, and thyroid normal to palpation  RESP: bronchial breath sounds throughout  CV: regular rate and rhythm, normal S1 S2, no S3 or S4, no murmur, click or rub, no peripheral edema and peripheral pulses strong  ABDOMEN: soft, nontender, no hepatosplenomegaly, no masses and bowel sounds normal  MS: no gross musculoskeletal defects noted, no edema  SKIN: no suspicious lesions or rashes    Results for orders placed or performed in visit on 12/06/23   XR Chest 2 Views     Status: None    Narrative    XR CHEST 2 VIEWS 12/6/2023 10:42 AM    HISTORY: Acute cough; SOB (shortness of breath)    COMPARISON: None.      Impression    IMPRESSION: Hyperexpanded lungs. No infiltrate, pleural effusion or  pneumothorax. Normal heart size.    QUINTEN SORIANO MD         SYSTEM ID:  [id number]

## 2023-12-26 ENCOUNTER — OFFICE VISIT (OUTPATIENT)
Dept: FAMILY MEDICINE | Facility: CLINIC | Age: 73
End: 2023-12-26
Payer: COMMERCIAL

## 2023-12-26 VITALS
OXYGEN SATURATION: 97 % | BODY MASS INDEX: 20.49 KG/M2 | WEIGHT: 123 LBS | HEART RATE: 73 BPM | HEIGHT: 65 IN | RESPIRATION RATE: 17 BRPM | TEMPERATURE: 97.6 F | SYSTOLIC BLOOD PRESSURE: 123 MMHG | DIASTOLIC BLOOD PRESSURE: 64 MMHG

## 2023-12-26 DIAGNOSIS — K52.839 MICROSCOPIC COLITIS, UNSPECIFIED MICROSCOPIC COLITIS TYPE: ICD-10-CM

## 2023-12-26 DIAGNOSIS — R19.7 DIARRHEA, UNSPECIFIED TYPE: ICD-10-CM

## 2023-12-26 DIAGNOSIS — L98.9 SKIN LESION: ICD-10-CM

## 2023-12-26 DIAGNOSIS — M54.50 ACUTE BILATERAL LOW BACK PAIN WITHOUT SCIATICA: ICD-10-CM

## 2023-12-26 DIAGNOSIS — G47.00 INSOMNIA, UNSPECIFIED TYPE: ICD-10-CM

## 2023-12-26 DIAGNOSIS — F41.9 ANXIETY: ICD-10-CM

## 2023-12-26 DIAGNOSIS — M85.88 OSTEOPENIA OF LUMBAR SPINE: ICD-10-CM

## 2023-12-26 DIAGNOSIS — E78.00 ELEVATED CHOLESTEROL: ICD-10-CM

## 2023-12-26 DIAGNOSIS — Z00.00 ENCOUNTER FOR MEDICARE ANNUAL WELLNESS EXAM: Primary | ICD-10-CM

## 2023-12-26 LAB
ALBUMIN SERPL BCG-MCNC: 3.9 G/DL (ref 3.5–5.2)
ALP SERPL-CCNC: 55 U/L (ref 40–150)
ALT SERPL W P-5'-P-CCNC: 12 U/L (ref 0–50)
ANION GAP SERPL CALCULATED.3IONS-SCNC: 8 MMOL/L (ref 7–15)
AST SERPL W P-5'-P-CCNC: 23 U/L (ref 0–45)
BILIRUB SERPL-MCNC: 0.4 MG/DL
BUN SERPL-MCNC: 9.3 MG/DL (ref 8–23)
CALCIUM SERPL-MCNC: 8.7 MG/DL (ref 8.8–10.2)
CHLORIDE SERPL-SCNC: 102 MMOL/L (ref 98–107)
CHOLEST SERPL-MCNC: 151 MG/DL
CREAT SERPL-MCNC: 0.53 MG/DL (ref 0.51–0.95)
DEPRECATED HCO3 PLAS-SCNC: 25 MMOL/L (ref 22–29)
EGFRCR SERPLBLD CKD-EPI 2021: >90 ML/MIN/1.73M2
ERYTHROCYTE [DISTWIDTH] IN BLOOD BY AUTOMATED COUNT: 13.2 % (ref 10–15)
FASTING STATUS PATIENT QL REPORTED: YES
GLUCOSE SERPL-MCNC: 81 MG/DL (ref 70–99)
HCT VFR BLD AUTO: 39.1 % (ref 35–47)
HDLC SERPL-MCNC: 67 MG/DL
HGB BLD-MCNC: 13 G/DL (ref 11.7–15.7)
LDLC SERPL CALC-MCNC: 73 MG/DL
MCH RBC QN AUTO: 30.6 PG (ref 26.5–33)
MCHC RBC AUTO-ENTMCNC: 33.2 G/DL (ref 31.5–36.5)
MCV RBC AUTO: 92 FL (ref 78–100)
NONHDLC SERPL-MCNC: 84 MG/DL
PLATELET # BLD AUTO: 477 10E3/UL (ref 150–450)
POTASSIUM SERPL-SCNC: 3.4 MMOL/L (ref 3.4–5.3)
PROT SERPL-MCNC: 6.6 G/DL (ref 6.4–8.3)
RBC # BLD AUTO: 4.25 10E6/UL (ref 3.8–5.2)
SODIUM SERPL-SCNC: 135 MMOL/L (ref 135–145)
TRIGL SERPL-MCNC: 56 MG/DL
VIT D+METAB SERPL-MCNC: 36 NG/ML (ref 20–50)
WBC # BLD AUTO: 6.3 10E3/UL (ref 4–11)

## 2023-12-26 PROCEDURE — 36415 COLL VENOUS BLD VENIPUNCTURE: CPT | Performed by: FAMILY MEDICINE

## 2023-12-26 PROCEDURE — 85027 COMPLETE CBC AUTOMATED: CPT | Performed by: FAMILY MEDICINE

## 2023-12-26 PROCEDURE — 80053 COMPREHEN METABOLIC PANEL: CPT | Performed by: FAMILY MEDICINE

## 2023-12-26 PROCEDURE — 82306 VITAMIN D 25 HYDROXY: CPT | Performed by: FAMILY MEDICINE

## 2023-12-26 PROCEDURE — 80061 LIPID PANEL: CPT | Performed by: FAMILY MEDICINE

## 2023-12-26 PROCEDURE — G0008 ADMIN INFLUENZA VIRUS VAC: HCPCS | Performed by: FAMILY MEDICINE

## 2023-12-26 PROCEDURE — G0439 PPPS, SUBSEQ VISIT: HCPCS | Performed by: FAMILY MEDICINE

## 2023-12-26 PROCEDURE — 99214 OFFICE O/P EST MOD 30 MIN: CPT | Mod: 25 | Performed by: FAMILY MEDICINE

## 2023-12-26 PROCEDURE — 90662 IIV NO PRSV INCREASED AG IM: CPT | Performed by: FAMILY MEDICINE

## 2023-12-26 RX ORDER — ZOLPIDEM TARTRATE 5 MG/1
5 TABLET ORAL
Qty: 30 TABLET | Refills: 0 | Status: SHIPPED | OUTPATIENT
Start: 2023-12-26

## 2023-12-26 RX ORDER — CITALOPRAM HYDROBROMIDE 20 MG/1
20 TABLET ORAL DAILY
Qty: 90 TABLET | Refills: 3 | Status: SHIPPED | OUTPATIENT
Start: 2023-12-26

## 2023-12-26 RX ORDER — CYCLOBENZAPRINE HCL 10 MG
10 TABLET ORAL 3 TIMES DAILY PRN
Qty: 20 TABLET | Refills: 1 | Status: SHIPPED | OUTPATIENT
Start: 2023-12-26

## 2023-12-26 RX ORDER — ALENDRONATE SODIUM 70 MG/1
TABLET ORAL
Qty: 12 TABLET | Refills: 3 | Status: CANCELLED | OUTPATIENT
Start: 2023-12-26

## 2023-12-26 RX ORDER — ATORVASTATIN CALCIUM 20 MG/1
20 TABLET, FILM COATED ORAL DAILY
Qty: 100 TABLET | Refills: 3 | Status: SHIPPED | OUTPATIENT
Start: 2023-12-26

## 2023-12-26 RX ORDER — RESPIRATORY SYNCYTIAL VIRUS VACCINE 120MCG/0.5
0.5 KIT INTRAMUSCULAR ONCE
Qty: 1 EACH | Refills: 0 | Status: CANCELLED | OUTPATIENT
Start: 2023-12-26 | End: 2023-12-26

## 2023-12-26 RX ORDER — ALBUTEROL SULFATE 90 UG/1
AEROSOL, METERED RESPIRATORY (INHALATION)
COMMUNITY
Start: 2023-12-17

## 2023-12-26 ASSESSMENT — ENCOUNTER SYMPTOMS
PALPITATIONS: 0
SHORTNESS OF BREATH: 1
BREAST MASS: 0
HEMATOCHEZIA: 0
COUGH: 1
HEARTBURN: 0
HEADACHES: 0
EYE PAIN: 0
MYALGIAS: 0
SORE THROAT: 0
DYSURIA: 0
CHILLS: 0
JOINT SWELLING: 0
NAUSEA: 0
WEAKNESS: 0
PARESTHESIAS: 0
ABDOMINAL PAIN: 0
CONSTIPATION: 0
FREQUENCY: 0
DIZZINESS: 0
DIARRHEA: 1
ARTHRALGIAS: 0
NERVOUS/ANXIOUS: 0
FEVER: 0
HEMATURIA: 0

## 2023-12-26 ASSESSMENT — PATIENT HEALTH QUESTIONNAIRE - PHQ9
SUM OF ALL RESPONSES TO PHQ QUESTIONS 1-9: 1
SUM OF ALL RESPONSES TO PHQ QUESTIONS 1-9: 1
10. IF YOU CHECKED OFF ANY PROBLEMS, HOW DIFFICULT HAVE THESE PROBLEMS MADE IT FOR YOU TO DO YOUR WORK, TAKE CARE OF THINGS AT HOME, OR GET ALONG WITH OTHER PEOPLE: NOT DIFFICULT AT ALL

## 2023-12-26 ASSESSMENT — ACTIVITIES OF DAILY LIVING (ADL): CURRENT_FUNCTION: NO ASSISTANCE NEEDED

## 2023-12-26 ASSESSMENT — PAIN SCALES - GENERAL: PAINLEVEL: NO PAIN (0)

## 2023-12-26 NOTE — PATIENT INSTRUCTIONS
Patient Education   Personalized Prevention Plan  You are due for the preventive services outlined below.  Your care team is available to assist you in scheduling these services.  If you have already completed any of these items, please share that information with your care team to update in your medical record.  Health Maintenance Due   Topic Date Due     RSV VACCINE (Pregnancy & 60+) (1 - 1-dose 60+ series) Never done     Zoster (Shingles) Vaccine (2 of 3) 02/11/2021     Flu Vaccine (1) 09/01/2023     COVID-19 Vaccine (5 - 2023-24 season) 09/01/2023     Annual Wellness Visit  12/15/2023

## 2023-12-26 NOTE — PROGRESS NOTES
"SUBJECTIVE:   Amy is a 73 year old, presenting for the following:  Physical        12/26/2023     9:06 AM   Additional Questions   Roomed by yariel       Are you in the first 12 months of your Medicare coverage?  No    Healthy Habits:     In general, how would you rate your overall health?  Good    Frequency of exercise:  4-5 days/week    Duration of exercise:  15-30 minutes    Do you usually eat at least 4 servings of fruit and vegetables a day, include whole grains    & fiber and avoid regularly eating high fat or \"junk\" foods?  Yes    Taking medications regularly:  Yes    Medication side effects:  Significant flushing and Lightheadedness    Ability to successfully perform activities of daily living:  No assistance needed    Home Safety:  No safety concerns identified    Hearing Impairment:  Find that men's voices are easier to understand than woman's and difficulty understanding soft or whispered speech    In the past 6 months, have you been bothered by leaking of urine? Yes    In general, how would you rate your overall mental or emotional health?  Excellent    Additional concerns today:  Yes    Sick since end of November  Was seen in UC 3 times  Given abx x2   Exacerbated her colitis and is not getting better  Is having diarrhea since Dec 6  Does have GI MD to address colitis  Did not tolerate prednisone either  Albuterol finally helping, down to using once pre day  Energy is down due to all of this      Spot on nose      Today's PHQ-9 Score:       12/26/2023     8:54 AM   PHQ-9 SCORE   PHQ-9 Total Score MyChart 1 (Minimal depression)   PHQ-9 Total Score 1           Have you ever done Advance Care Planning? (For example, a Health Directive, POLST, or a discussion with a medical provider or your loved ones about your wishes): No, advance care planning information given to patient to review.  Patient declined advance care planning discussion at this time.       Fall risk  Fallen 2 or more times in the past " year?: No  Any fall with injury in the past year?: No    Cognitive Screening  No concerns based on direct observation      Do you have sleep apnea, excessive snoring or daytime drowsiness? : no    Reviewed and updated as needed this visit by clinical staff   Tobacco  Allergies  Meds              Reviewed and updated as needed this visit by Provider                 Social History     Tobacco Use     Smoking status: Former     Packs/day: 1.00     Years: 7.00     Additional pack years: 0.00     Total pack years: 7.00     Types: Cigarettes     Start date: 1966     Quit date: 1973     Years since quittin.0     Smokeless tobacco: Never     Tobacco comments:     I smoked from  to    Substance Use Topics     Alcohol use: Not Currently             2023     8:58 AM   Alcohol Use   Prescreen: >3 drinks/day or >7 drinks/week? Not Applicable     Do you have a current opioid prescription? No  Do you use any other controlled substances or medications that are not prescribed by a provider? None              Current providers sharing in care for this patient include:   Patient Care Team:  Jasmin Mcintyre MD as PCP - General (Family Medicine)  Jasmin Mcintyre MD as Assigned PCP  Jasmin Mcintyre MD as Referring Physician (Family Medicine)  Lynne Gavin PA-C as Physician Assistant (Dermatology)  Sylvester Matute MD as MD (Gastroenterology)  Del Amaya PA as Assigned Musculoskeletal Provider  Reynaldo Barron OD as Assigned Surgical Provider    The following health maintenance items are reviewed in Epic and correct as of today:  Health Maintenance   Topic Date Due     RSV VACCINE (Pregnancy & 60+) (1 - 1-dose 60+ series) Never done     ZOSTER IMMUNIZATION (2 of 3) 2021     INFLUENZA VACCINE (1) 2023     COVID-19 Vaccine (2023- season) 2023     MEDICARE ANNUAL WELLNESS VISIT  12/15/2023     PHQ-9  2024     ANNUAL REVIEW OF  ORDERS  " 07/31/2024     FALL RISK ASSESSMENT  12/26/2024     MAMMO SCREENING  02/03/2025     LIPID  12/15/2027     ADVANCE CARE PLANNING  12/15/2027     COLORECTAL CANCER SCREENING  04/02/2028     DTAP/TDAP/TD IMMUNIZATION (4 - Td or Tdap) 06/10/2029     DEXA  02/08/2038     Pneumococcal Vaccine: 65+ Years  Completed     HEPATITIS C SCREENING  Addressed     DEPRESSION ACTION PLAN  Addressed     IPV IMMUNIZATION  Aged Out     HPV IMMUNIZATION  Aged Out     MENINGITIS IMMUNIZATION  Aged Out     RSV MONOCLONAL ANTIBODY  Aged Out     Lab work is in process  Mammogram Screening: Mammogram Screening - Patient over age 75, has elected to continue with screening.      Pertinent mammograms are reviewed under the imaging tab.    Review of Systems   Constitutional:  Negative for chills and fever.   HENT:  Negative for congestion, ear pain, hearing loss and sore throat.    Eyes:  Negative for pain and visual disturbance.   Respiratory:  Positive for cough and shortness of breath.    Cardiovascular:  Negative for chest pain, palpitations and peripheral edema.   Gastrointestinal:  Positive for diarrhea. Negative for abdominal pain, constipation, heartburn, hematochezia and nausea.   Breasts:  Negative for tenderness, breast mass and discharge.   Genitourinary:  Negative for dysuria, frequency, genital sores, hematuria, pelvic pain, urgency, vaginal bleeding and vaginal discharge.   Musculoskeletal:  Negative for arthralgias, joint swelling and myalgias.   Skin:  Positive for rash.   Neurological:  Negative for dizziness, weakness, headaches and paresthesias.   Psychiatric/Behavioral:  Negative for mood changes. The patient is not nervous/anxious.      Constitutional, HEENT, cardiovascular, pulmonary, gi and gu systems are negative, except as otherwise noted.    OBJECTIVE:   /64   Pulse 73   Temp 97.6  F (36.4  C) (Oral)   Resp 17   Ht 1.638 m (5' 4.5\")   Wt 55.8 kg (123 lb)   LMP  (LMP Unknown)   SpO2 97%   BMI 20.79 kg/m   " "Estimated body mass index is 20.79 kg/m  as calculated from the following:    Height as of this encounter: 1.638 m (5' 4.5\").    Weight as of this encounter: 55.8 kg (123 lb).  Physical Exam  GENERAL: healthy, alert and no distress  EYES: Eyes grossly normal to inspection, PERRL and conjunctivae and sclerae normal  HENT: ear canals and TM's normal, nose and mouth without ulcers or lesions  NECK: no adenopathy, no asymmetry, masses, or scars and thyroid normal to palpation  RESP: lungs clear to auscultation - no rales, rhonchi or wheezes  CV: regular rate and rhythm, normal S1 S2, no S3 or S4, no murmur, click or rub, no peripheral edema and peripheral pulses strong  ABDOMEN: soft, nontender, no hepatosplenomegaly, no masses and bowel sounds normal  MS: no gross musculoskeletal defects noted, no edema  SKIN: no suspicious lesions or rashes  NEURO: Normal strength and tone, mentation intact and speech normal  PSYCH: mentation appears normal, affect normal/bright    Diagnostic Test Results:  Labs reviewed in Epic  Results for orders placed or performed in visit on 12/26/23 (from the past 24 hour(s))   **CBC with platelets FUTURE 2mo   Result Value Ref Range    WBC Count 6.3 4.0 - 11.0 10e3/uL    RBC Count 4.25 3.80 - 5.20 10e6/uL    Hemoglobin 13.0 11.7 - 15.7 g/dL    Hematocrit 39.1 35.0 - 47.0 %    MCV 92 78 - 100 fL    MCH 30.6 26.5 - 33.0 pg    MCHC 33.2 31.5 - 36.5 g/dL    RDW 13.2 10.0 - 15.0 %    Platelet Count 477 (H) 150 - 450 10e3/uL       ASSESSMENT / PLAN:   (Z00.00) Encounter for Medicare annual wellness exam  (primary encounter diagnosis)  Comment:   Plan:     (M85.88) Osteopenia of lumbar spine  Comment: pt wants referral to endo  Has been on fosamax 5 years  Recommended to stop and repeat dexa in one year  Plan: **Vitamin D Deficiency FUTURE 2mo, Adult         Endocrinology  Referral            (E78.00) Elevated cholesterol  Comment: on statin  Plan: atorvastatin (LIPITOR) 20 MG tablet, Lipid "         panel reflex to direct LDL Fasting, **CBC with         platelets FUTURE 2mo, **Comprehensive metabolic        panel FUTURE 2mo         (F41.9) Anxiety  Comment: well controlled on meds  Plan: citalopram (CELEXA) 20 MG tablet            (R19.7) Diarrhea, unspecified type  Comment: after abx, ? Due to c diff vs flare up of her colitis?  Plan: Enteric Bacteria and Virus Panel by ULISES Stool,         C. difficile Toxin B PCR with reflex to C.         difficile Antigen and Toxins A/B EIA            (K52.839) Microscopic colitis, unspecified microscopic colitis type  Comment:   Plan: refer back to GI    (L98.9) Skin lesion  Comment: refer to derm  Plan: Adult Dermatology  Referral            (M54.50) Acute bilateral low back pain without sciatica  Comment: wants refill to use as needed  Plan: cyclobenzaprine (FLEXERIL) 10 MG tablet            (G47.00) Insomnia, unspecified type  Comment: refill to use sparingly as needed due to age  Plan: zolpidem (AMBIEN) 5 MG tablet            Patient has been advised of split billing requirements and indicates understanding: Yes      COUNSELING:  Reviewed preventive health counseling, as reflected in patient instructions       Regular exercise       Healthy diet/nutrition       Vision screening       Dental care       Osteoporosis prevention/bone health        She reports that she quit smoking about 51 years ago. Her smoking use included cigarettes. She started smoking about 58 years ago. She has a 7 pack-year smoking history. She has never used smokeless tobacco.      Appropriate preventive services were discussed with this patient, including applicable screening as appropriate for fall prevention, nutrition, physical activity, Tobacco-use cessation, weight loss and cognition.  Checklist reviewing preventive services available has been given to the patient.    Reviewed patients plan of care and provided an AVS. The Intermediate Care Plan ( asthma action plan, low back  pain action plan, and migraine action plan) for Sara meets the Care Plan requirement. This Care Plan has been established and reviewed with the Patient.          Jasmin Mcintyre MD  [facility]    Identified Health Risks:  I have reviewed Opioid Use Disorder and Substance Use Disorder risk factors and made any needed referrals.   Answers submitted by the patient for this visit:  Patient Health Questionnaire (Submitted on 12/26/2023)  If you checked off any problems, how difficult have these problems made it for you to do your work, take care of things at home, or get along with other people?: Not difficult at all  PHQ9 TOTAL SCORE: 1

## 2023-12-28 LAB

## 2023-12-29 ENCOUNTER — OFFICE VISIT (OUTPATIENT)
Dept: DERMATOLOGY | Facility: CLINIC | Age: 73
End: 2023-12-29
Payer: COMMERCIAL

## 2023-12-29 DIAGNOSIS — L57.0 AK (ACTINIC KERATOSIS): Primary | ICD-10-CM

## 2023-12-29 PROCEDURE — 17000 DESTRUCT PREMALG LESION: CPT | Performed by: NURSE PRACTITIONER

## 2023-12-29 NOTE — PROGRESS NOTES
[facility] Dermatology Note  Encounter Date: Dec 29, 2023  Office Visit     Reviewed patients past medical history and pertinent chart review prior to patients visit today.     Dermatology Problem List:  History of SCC on the left thigh in 2019  AK tip of nose, cryo 12/29/2023    ____________________________________________    Assessment & Plan:     # Actinic keratosis, tip of nose. Premalignant nature discussed with patient. Treatment options discussed with patient today including no treatment, topical treatment, and cryotherapy. Patient elects to treat visible lesions today with cryotherapy. After verbal consent and discussion of risks and benefits including but no limited to dyspigmentation/scar, blister, and pain. A total of 1 actinic keratoses were treated with 1-2mm freeze border for 2 cycle with liquid nitrogen. Post cryotherapy instructions were provided.     Neema Fofana, CNP  Dermatology    _______________________________________    CC: Derm Problem (Lesion at tip of nose that has been there for a 1/2 year with scaling but no pain/SCC 2019)    HPI:  Ms. Sara Keita is a(n) 73 year old female who presents today as a return patient for spot of concern on the left hip of nose.  Is been present for about 6 months.  It has been scaling but it has not been tender or bleeding.  It is not going away on its own.    Patient is otherwise feeling well, without additional skin concerns.      Physical Exam:  SKIN: Focused examination of nose was performed.  -Left tip of nose, about 8 mm pink scaly well-defined papule    - No other lesions of concern on areas examined.     Medications:  Current Outpatient Medications   Medication    albuterol (PROAIR HFA/PROVENTIL HFA/VENTOLIN HFA) 108 (90 Base) MCG/ACT inhaler    alendronate (FOSAMAX) 70 MG tablet    atorvastatin (LIPITOR) 20 MG tablet    citalopram (CELEXA) 20 MG tablet    cyclobenzaprine (FLEXERIL) 10 MG tablet    diclofenac (VOLTAREN) 1 %  topical gel    guaiFENesin-codeine (ROBITUSSIN AC) 100-10 MG/5ML solution    multivitamin w/minerals (MULTI-VITAMIN) tablet    predniSONE (DELTASONE) 20 MG tablet    triamcinolone (KENALOG) 0.1 % external cream    Turmeric 500 MG TABS    Vitamin D, Cholecalciferol, 25 MCG (1000 UT) CAPS    zolpidem (AMBIEN) 5 MG tablet     No current facility-administered medications for this visit.      Past Medical History:   Patient Active Problem List   Diagnosis    Microscopic colitis, unspecified microscopic colitis type    Osteopenia of lumbar spine    Elevated cholesterol    Other migraine without status migrainosus, not intractable    Insomnia, unspecified type    Herpes zoster without complication    Anxiety    Combined forms of age-related cataract, mild, of both eyes    Patellar instability     No past medical history on file.    CC Jasmin Mcintyre MD  [id number] [address] [id number] on close of this encounter.

## 2023-12-29 NOTE — LETTER
12/29/2023         RE: Sara Keita  [address] [facility] [address]        Dear Colleague,    Thank you for referring your patient, Sara Keita, to the [facility]. Please see a copy of my visit note below.    [facility] Dermatology Note  Encounter Date: Dec 29, 2023  Office Visit     Reviewed patients past medical history and pertinent chart review prior to patients visit today.     Dermatology Problem List:  History of SCC on the left thigh in 2019  AK tip of nose, cryo 12/29/2023    ____________________________________________    Assessment & Plan:     # Actinic keratosis, tip of nose. Premalignant nature discussed with patient. Treatment options discussed with patient today including no treatment, topical treatment, and cryotherapy. Patient elects to treat visible lesions today with cryotherapy. After verbal consent and discussion of risks and benefits including but no limited to dyspigmentation/scar, blister, and pain. A total of 1 actinic keratoses were treated with 1-2mm freeze border for 2 cycle with liquid nitrogen. Post cryotherapy instructions were provided.     Neema Fofana, CNP  Dermatology    _______________________________________    CC: Derm Problem (Lesion at tip of nose that has been there for a 1/2 year with scaling but no pain/SCC 2019)    HPI:  Ms. Sara Keita is a(n) 73 year old female who presents today as a return patient for spot of concern on the left hip of nose.  Is been present for about 6 months.  It has been scaling but it has not been tender or bleeding.  It is not going away on its own.    Patient is otherwise feeling well, without additional skin concerns.      Physical Exam:  SKIN: Focused examination of nose was performed.  -Left tip of nose, about 8 mm pink scaly well-defined papule    - No other lesions of concern on areas examined.     Medications:  Current Outpatient Medications   Medication     albuterol (PROAIR  HFA/PROVENTIL HFA/VENTOLIN HFA) 108 (90 Base) MCG/ACT inhaler     alendronate (FOSAMAX) 70 MG tablet     atorvastatin (LIPITOR) 20 MG tablet     citalopram (CELEXA) 20 MG tablet     cyclobenzaprine (FLEXERIL) 10 MG tablet     diclofenac (VOLTAREN) 1 % topical gel     guaiFENesin-codeine (ROBITUSSIN AC) 100-10 MG/5ML solution     multivitamin w/minerals (MULTI-VITAMIN) tablet     predniSONE (DELTASONE) 20 MG tablet     triamcinolone (KENALOG) 0.1 % external cream     Turmeric 500 MG TABS     Vitamin D, Cholecalciferol, 25 MCG (1000 UT) CAPS     zolpidem (AMBIEN) 5 MG tablet     No current facility-administered medications for this visit.      Past Medical History:   Patient Active Problem List   Diagnosis     Microscopic colitis, unspecified microscopic colitis type     Osteopenia of lumbar spine     Elevated cholesterol     Other migraine without status migrainosus, not intractable     Insomnia, unspecified type     Herpes zoster without complication     Anxiety     Combined forms of age-related cataract, mild, of both eyes     Patellar instability     No past medical history on file.    [place]  [address] [place] [address] on close of this encounter.       Again, thank you for allowing me to participate in the care of your patient.        Sincerely,        Vicki Fofana, JHON CNP

## 2024-03-01 ENCOUNTER — OFFICE VISIT (OUTPATIENT)
Dept: DERMATOLOGY | Facility: CLINIC | Age: 74
End: 2024-03-01
Attending: NURSE PRACTITIONER
Payer: COMMERCIAL

## 2024-03-01 DIAGNOSIS — L57.0 AK (ACTINIC KERATOSIS): Primary | ICD-10-CM

## 2024-03-01 PROCEDURE — 99214 OFFICE O/P EST MOD 30 MIN: CPT | Performed by: NURSE PRACTITIONER

## 2024-03-01 NOTE — LETTER
3/1/2024         RE: Sara Keita  [address] [facility] [address]        Dear Colleague,    Thank you for referring your patient, Sara Keita, to the [facility]. Please see a copy of my visit note below.    [facility] Dermatology Note  Encounter Date: Mar 1, 2024  Office Visit     Reviewed patients past medical history and pertinent chart review prior to patients visit today.     Dermatology Problem List:  AK nose, cryo. 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  Discussed treatment options with patient including cryotherapy, Efudex, and Efudex plus calcipotriene.  Patient prefers to treat the face or just nose with Efudex plus calcipotriene.  Start Efudex calcipotriene twice daily x5-10 days. Counseled that patient should expect erythema and scale, but should discontinue this medication if significant oozing or pain greater than 5/10 is to occur. Recommend the patient wash hands after use or use gloves to apply. Keep medication away from pets. Handout provided with administration instructions.     Follow up for skin check around July, sooner TAWNY Fofana CNP  Dermatology    _______________________________________    CC: Actinic Keratosis (Cryo- nose x2)    HPI:  Ms. Sara Keita is a(n) 73 year old female who presents today for follow-up  for scaly spots on the nose.  We treated 1 spot at her last visit with me on 12/29/2023 and she thinks that 1 resolved but now she has a few more.  They are pink and scaly in texture.  They are asymptomatic.    Patient is otherwise feeling well, without additional skin concerns.      Physical Exam:  SKIN: Focused examination of face was performed.  - a few subtle ill defined pink scaly papules on nose    - No other lesions of concern on areas examined.      Medications:  Current Outpatient Medications   Medication     COMPOUNDED NON-CONTROLLED SUBSTANCE (CMPD RX) - PHARMACY TO MIX COMPOUNDED MEDICATION     albuterol (PROAIR HFA/PROVENTIL HFA/VENTOLIN HFA) 108 (90 Base) MCG/ACT inhaler     alendronate (FOSAMAX) 70 MG tablet     atorvastatin (LIPITOR) 20 MG tablet     citalopram (CELEXA) 20 MG tablet     cyclobenzaprine (FLEXERIL) 10 MG tablet     diclofenac (VOLTAREN) 1 % topical gel     guaiFENesin-codeine (ROBITUSSIN AC) 100-10 MG/5ML solution     multivitamin w/minerals (MULTI-VITAMIN) tablet     predniSONE (DELTASONE) 20 MG tablet     triamcinolone (KENALOG) 0.1 % external cream     Turmeric 500 MG TABS     Vitamin D, Cholecalciferol, 25 MCG (1000 UT) CAPS     zolpidem (AMBIEN) 5 MG tablet     No current facility-administered medications for this visit.      Past Medical History:   Patient Active Problem List   Diagnosis     Microscopic colitis, unspecified microscopic colitis type     Osteopenia of lumbar spine     Elevated cholesterol     Other migraine without status migrainosus, not intractable     Insomnia, unspecified type     Herpes zoster without complication     Anxiety     Combined forms of age-related cataract, mild, of both eyes     Patellar instability     Past Medical History:   Diagnosis Date     Squamous cell carcinoma of skin, unspecified        CC JHON Mirza CNP  [address] [facility] [place] [address] on close of this encounter.       Again, thank you for allowing me to participate in the care of your patient.        Sincerely,        JHON Kevin CNP

## 2024-03-01 NOTE — PATIENT INSTRUCTIONS
Efudex and calcipotriene combination Treatment  Today, you are being prescribed Fluorouracil (Efudex) a topical cream used for the treatment of Actinic Keratosis (AKs).  The medication is working to eliminate the unhealthy cells.  This treatment may be unattractive and somewhat uncomfortable.  Your treatment will last 5-10 days. You may experience some mild discomfort while being treated.  You will want to stop any other creams such as glycolic acid products, retin A, Tazorac, etc. to the area. You may use bland makeup/cover-up as long as it doesn't sting or cause you discomfort.  When using calcipotriene and Fluorouracil, apply the cream both morning and night for 5-10 days as your provider recommends. Use a Q-tip or use gloves if applying it with your fingertips. If applied with unprotected fingertips, it is important to wash your hands well after you apply this medicine. If you are using the Fluorouracil alone, apply only at night for 2-4 weeks.   Keep this medication away from pets.  We recommend avoiding excessive sun exposure to the treated area. Wear a sunscreen with SPF 50+ to the areas being treated prior to any sun exposure as you will be more sensitive to the sun and more prone to sunburn while being treated and afterwards during healing process.   You may use ointments such as vaseline or Aquaphor, or moisturizing creams such as Vanicream or Cetaphil cream over bothersome areas. If the reaction becomes itchy you may apply over the counter hydrocorisone 1% cream or ointment twice daily to itchy areas. If the reaction becomes too bothersome, please call the clinic as we may need to discontinue treatment or reevaluate in the clinic.     Potential Side Effects  Your treated areas may be unsightly during therapy.  This will improve slowly following the discontinuation of therapy.   During the beginning of treatment, mild inflammation may occur.   During the end of treatment, redness, and swelling may occur  with some crusting and burning.   Lesions resolve as the skin exfoliates.   Over 1 to 2 weeks, new skin grows into the treatment area.  Keep this medication away from pets as it can be very harmful to them.     Specific side effects that usually do not require medical attention (report to your doctor or health care professional if they continue or are bothersome) include:  Red or dark-colored skin   Mild erosion (loss of upper layer of skin)   Mild eye irritation including burning, itching, sensitivity, stinging, or watering   Increased sensitivity of the skin to sun and ultraviolet light   Pain and burning of the affected area   Dryness, scaling or swelling of the affected area   Skin rash, itching of the affected area   Tenderness   If you have severe pain, please, call the clinic immediately and indicate that you have pain.  Ask for a call from the RN.     Who should I call with questions?  Dermatology nurse line: [phone]

## 2024-03-01 NOTE — PROGRESS NOTES
[facility] Dermatology Note  Encounter Date: Mar 1, 2024  Office Visit     Reviewed patients past medical history and pertinent chart review prior to patients visit today.     Dermatology Problem List:  AK nose, cryo. 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  Discussed treatment options with patient including cryotherapy, Efudex, and Efudex plus calcipotriene.  Patient prefers to treat the face or just nose with Efudex plus calcipotriene.  Start Efudex calcipotriene twice daily x5-10 days. Counseled that patient should expect erythema and scale, but should discontinue this medication if significant oozing or pain greater than 5/10 is to occur. Recommend the patient wash hands after use or use gloves to apply. Keep medication away from pets. Handout provided with administration instructions.     Follow up for skin check around July, sooner TAWNY Fofana, KURT  Dermatology    _______________________________________    CC: Actinic Keratosis (Cryo- nose x2)    HPI:  Ms. Sara Keita is a(n) 73 year old female who presents today for follow-up  for scaly spots on the nose.  We treated 1 spot at her last visit with me on 12/29/2023 and she thinks that 1 resolved but now she has a few more.  They are pink and scaly in texture.  They are asymptomatic.    Patient is otherwise feeling well, without additional skin concerns.      Physical Exam:  SKIN: Focused examination of face was performed.  - a few subtle ill defined pink scaly papules on nose    - No other lesions of concern on areas examined.     Medications:  Current Outpatient Medications   Medication    COMPOUNDED NON-CONTROLLED SUBSTANCE (CMPD RX) - PHARMACY TO MIX COMPOUNDED MEDICATION    albuterol (PROAIR HFA/PROVENTIL HFA/VENTOLIN HFA) 108 (90 Base) MCG/ACT inhaler    alendronate (FOSAMAX) 70 MG  tablet    atorvastatin (LIPITOR) 20 MG tablet    citalopram (CELEXA) 20 MG tablet    cyclobenzaprine (FLEXERIL) 10 MG tablet    diclofenac (VOLTAREN) 1 % topical gel    guaiFENesin-codeine (ROBITUSSIN AC) 100-10 MG/5ML solution    multivitamin w/minerals (MULTI-VITAMIN) tablet    predniSONE (DELTASONE) 20 MG tablet    triamcinolone (KENALOG) 0.1 % external cream    Turmeric 500 MG TABS    Vitamin D, Cholecalciferol, 25 MCG (1000 UT) CAPS    zolpidem (AMBIEN) 5 MG tablet     No current facility-administered medications for this visit.      Past Medical History:   Patient Active Problem List   Diagnosis    Microscopic colitis, unspecified microscopic colitis type    Osteopenia of lumbar spine    Elevated cholesterol    Other migraine without status migrainosus, not intractable    Insomnia, unspecified type    Herpes zoster without complication    Anxiety    Combined forms of age-related cataract, mild, of both eyes    Patellar instability     Past Medical History:   Diagnosis Date    Squamous cell carcinoma of skin, unspecified        CC Vicki Fofana, APRN CNP  [address] [facility] [place] [id number] on close of this encounter.

## 2024-03-21 ENCOUNTER — ANCILLARY PROCEDURE (OUTPATIENT)
Dept: GENERAL RADIOLOGY | Facility: CLINIC | Age: 74
End: 2024-03-21
Attending: PEDIATRICS
Payer: COMMERCIAL

## 2024-03-21 ENCOUNTER — OFFICE VISIT (OUTPATIENT)
Dept: ORTHOPEDICS | Facility: CLINIC | Age: 74
End: 2024-03-21
Payer: COMMERCIAL

## 2024-03-21 VITALS — BODY MASS INDEX: 21 KG/M2 | HEIGHT: 64 IN | WEIGHT: 123 LBS

## 2024-03-21 DIAGNOSIS — M19.049 HAND ARTHRITIS: ICD-10-CM

## 2024-03-21 DIAGNOSIS — M25.531 RIGHT WRIST PAIN: ICD-10-CM

## 2024-03-21 DIAGNOSIS — M19.031 ARTHRITIS OF SCAPHOID-TRAPEZIUM-TRAPEZOID JOINT OF RIGHT HAND: ICD-10-CM

## 2024-03-21 DIAGNOSIS — M79.644 PAIN OF RIGHT THUMB: ICD-10-CM

## 2024-03-21 DIAGNOSIS — R20.2 NUMBNESS AND TINGLING IN RIGHT HAND: ICD-10-CM

## 2024-03-21 DIAGNOSIS — M25.531 RIGHT WRIST PAIN: Primary | ICD-10-CM

## 2024-03-21 DIAGNOSIS — R20.0 NUMBNESS AND TINGLING IN RIGHT HAND: ICD-10-CM

## 2024-03-21 DIAGNOSIS — M19.019 SHOULDER ARTHRITIS: ICD-10-CM

## 2024-03-21 PROCEDURE — 73110 X-RAY EXAM OF WRIST: CPT | Mod: TC | Performed by: RADIOLOGY

## 2024-03-21 PROCEDURE — 99213 OFFICE O/P EST LOW 20 MIN: CPT | Performed by: PEDIATRICS

## 2024-03-21 NOTE — LETTER
3/21/2024         RE: Sara Keita  [address] [facility] [address]        Dear Colleague,    Thank you for referring your patient, Sara Keita, to the [facility]. Please see a copy of my visit note below.    ASSESSMENT & PLAN    Amy was seen today for pain and pain.    Diagnoses and all orders for this visit:    Right wrist pain  -     XR Wrist Right G/E 3 Views; Future  -     Occupational Therapy  Referral; Future    Pain of right thumb  -     XR Wrist Right G/E 3 Views; Future  -     Occupational Therapy  Referral; Future  -     Wrist/Arm/Hand Bracking Supplies Order Thumb Keeper Brace (THUMB); Right    Arthritis of eprnivmn-wmvulcgdx-fgtmrkhjw joint of right hand  -     Occupational Therapy  Referral; Future  -     Wrist/Arm/Hand Bracking Supplies Order Thumb Keeper Brace (THUMB); Right    Hand arthritis  -     Occupational Therapy  Referral; Future    Shoulder arthritis  -     Orthopedic  Referral; Future    Numbness and tingling in right hand      This issue is chronic and Unchanged.        ICD-10-CM    1. Right wrist pain  M25.531 XR Wrist Right G/E 3 Views     Occupational Therapy  Referral      2. Pain of right thumb  M79.644 XR Wrist Right G/E 3 Views     Occupational Therapy  Referral     Wrist/Arm/Hand Bracking Supplies Order Thumb Keeper Brace (THUMB); Right      3. Arthritis of ppdhddcg-yujuvcqjg-baskgofjc joint of right hand  M19.031 Occupational Therapy  Referral     Wrist/Arm/Hand Bracking Supplies Order Thumb Keeper Brace (THUMB); Right      4. Hand arthritis  M19.049 Occupational Therapy  Referral      5. Shoulder arthritis  M19.019 Orthopedic  Referral      6. Numbness and tingling in right hand  R20.0     R20.2         Patient Instructions   We discussed supportive care of bracing and OTC medications, role of occupational hand therapy, consideration  of injections and surgical referral.    Discussed nature of shoulder osteoarthritis. Discussed symptom treatment with over-the-counter medications, ice or heat and rest if needed. Discussed physical therapy for strength. Discussed injection therapy including subacromial or glenohumeral corticosteroid injections. Also briefly discussed future consideration of referral to orthopedic surgery for further evaluation and discussion of arthroplasty.  - patient was previously seen for shoulder arthritis - would like to know her surgical options. Discussed briefly, referral placed.    Discussed anatomy and pathophysiology of carpal tunnel. Discussed avoidance of exacerbating activities and use of braces.  Also discussed formal occupational hand therapy, potential referral for evaluation with EMG to evaluate severity.  Would consider referral pending course with treatment.      Plan:  - Today's Plan of Care:  Rehab: Occupational Therapy: [facility] - [phone]  Discussed bracing options- thumb brace as needed for arthritis, wrist brace overnight for numbness/tingling    Referral to Orthopedic Surgery for shoulder discussion    Discussed activity considerations and other supportive care including Ice/Heat, OTC and other topical medications as needed.  Voltaren/Diclofenac Gel    -We also discussed other future treatment options:  US guided wrist injections  EMG if numbness and tingling persists    Follow Up: as needed    Concerning signs and symptoms were reviewed and all questions were answered at this time.    Shea Brown MD [facility]  Sports Medicine Physician  [facility]    -----  Chief Complaint   Patient presents with     Right Wrist - Pain     Right Thumb - Pain       SUBJECTIVE  Sara A Melissaus is a/an 73 year old female who is seen as a self referral for evaluation of right thumb and right wrist.     The patient is seen by themselves.  The patient is Right handed    Onset: 1 month(s) ago.  "Reports insidious onset without acute precipitating event.  Location of Pain: right thumb, right wrist   Worsened by: grabbing, pulling, pushing off with Yoga, sleeping at night, driving (resting)  Better with: aleve  Treatments tried: Aleve and casting/splinting/bracing  Associated symptoms: tingling hand wrist and up the arm, weakness of right hand, and feeling of instability    Orthopedic/Surgical history: YES - Date: she has previously been seen by us on 5/16/22.  Had thumb dislocation when she was a child   Social History/Occupation: retired, likes Yoga       REVIEW OF SYSTEMS:  Review of Systems    OBJECTIVE:  Ht 1.626 m (5' 4\")   Wt 55.8 kg (123 lb)   LMP  (LMP Unknown)   BMI 21.11 kg/m     General: healthy, alert and in no distress  Skin: no suspicious lesions or rash.  CV: distal perfusion intact   Resp: normal respiratory effort without conversational dyspnea   Psych: normal mood and affect  Gait: NORMAL  Neuro: Normal light sensory exam of upper extremity    Right Wrist and Hand exam    Inspection:       No swelling or bruising, arthritis deformities bilaterally    Tender:       MCP joint right thumb, STT joint    Non Tender:       Remainder of the Wrist and Hand bilateral    ROM:       Full and symmetric active and passive range of motion of the forearm, wrist and digits bilateral    Strength:       5/5 strength in the muscles of the hand, wrist and forearm bilateral    Special Tests:        positive (+) Tinel's test right and       neg (-) Phalen's test bilateral    Neurovascular:       2+ radial pulses bilaterally with brisk capillary refill and      normal sensation to light touch in the radial, median and ulnar nerve distributions      RADIOLOGY:  Final results and radiologist's interpretation, available in the [facility] health record.  Images were reviewed with the patient in the office today.  My personal interpretation of the performed imaging:     3 XR views of right wrist and thumb reviewed: no " acute bony abnormality, STT joint degenerative changes, MCP joint degenerative changes  - will follow official read    Review of the result(s) of each unique test - XR             Again, thank you for allowing me to participate in the care of your patient.        Sincerely,        Shea Brown MD

## 2024-03-21 NOTE — PATIENT INSTRUCTIONS
We discussed supportive care of bracing and OTC medications, role of occupational hand therapy, consideration of injections and surgical referral.    Discussed nature of shoulder osteoarthritis. Discussed symptom treatment with over-the-counter medications, ice or heat and rest if needed. Discussed physical therapy for strength. Discussed injection therapy including subacromial or glenohumeral corticosteroid injections. Also briefly discussed future consideration of referral to orthopedic surgery for further evaluation and discussion of arthroplasty.    Discussed anatomy and pathophysiology of carpal tunnel. Discussed avoidance of exacerbating activities and use of braces.  Also discussed formal occupational hand therapy, potential referral for evaluation with EMG to evaluate severity.  Would consider referral pending course with treatment.      Plan:  - Today's Plan of Care:  [phone]  Discussed bracing options- thumb brace as needed for arthritis, wrist brace overnight for numbness/tingling    Referral to Orthopedic Surgery for shoulder discussion    Discussed activity considerations and other supportive care including Ice/Heat, OTC and other topical medications as needed.  Voltaren/Diclofenac Gel    -We also discussed other future treatment options:  US guided wrist injections  EMG if numbness and tingling persists    Follow Up: as needed    If you have any further questions for your physician or physician s care team you can call [phone] and use option 3 to leave a voice message.

## 2024-03-21 NOTE — PROGRESS NOTES
ASSESSMENT & PLAN    Amy was seen today for pain and pain.    Diagnoses and all orders for this visit:    Right wrist pain  -     XR Wrist Right G/E 3 Views; Future  -     Occupational Therapy  Referral; Future    Pain of right thumb  -     XR Wrist Right G/E 3 Views; Future  -     Occupational Therapy  Referral; Future  -     Wrist/Arm/Hand Bracking Supplies Order Thumb Keeper Brace (THUMB); Right    Arthritis of qwoftrdi-xswdmxsed-kwowqocrz joint of right hand  -     Occupational Therapy  Referral; Future  -     Wrist/Arm/Hand Bracking Supplies Order Thumb Keeper Brace (THUMB); Right    Hand arthritis  -     Occupational Therapy  Referral; Future    Shoulder arthritis  -     Orthopedic  Referral; Future    Numbness and tingling in right hand      This issue is chronic and Unchanged.        ICD-10-CM    1. Right wrist pain  M25.531 XR Wrist Right G/E 3 Views     Occupational Therapy  Referral      2. Pain of right thumb  M79.644 XR Wrist Right G/E 3 Views     Occupational Therapy  Referral     Wrist/Arm/Hand Bracking Supplies Order Thumb Keeper Brace (THUMB); Right      3. Arthritis of kipclrih-lrmupmgvt-jixpobenc joint of right hand  M19.031 Occupational Therapy  Referral     Wrist/Arm/Hand Bracking Supplies Order Thumb Keeper Brace (THUMB); Right      4. Hand arthritis  M19.049 Occupational Therapy  Referral      5. Shoulder arthritis  M19.019 Orthopedic  Referral      6. Numbness and tingling in right hand  R20.0     R20.2         Patient Instructions   We discussed supportive care of bracing and OTC medications, role of occupational hand therapy, consideration of injections and surgical referral.    Discussed nature of shoulder osteoarthritis. Discussed symptom treatment with over-the-counter medications, ice or heat and rest if needed. Discussed physical therapy for strength. Discussed injection therapy including  subacromial or glenohumeral corticosteroid injections. Also briefly discussed future consideration of referral to orthopedic surgery for further evaluation and discussion of arthroplasty.  - patient was previously seen for shoulder arthritis - would like to know her surgical options. Discussed briefly, referral placed.    Discussed anatomy and pathophysiology of carpal tunnel. Discussed avoidance of exacerbating activities and use of braces.  Also discussed formal occupational hand therapy, potential referral for evaluation with EMG to evaluate severity.  Would consider referral pending course with treatment.      Plan:  - Today's Plan of Care:  Rehab: Occupational Therapy: [facility] - [phone]  Discussed bracing options- thumb brace as needed for arthritis, wrist brace overnight for numbness/tingling    Referral to Orthopedic Surgery for shoulder discussion    Discussed activity considerations and other supportive care including Ice/Heat, OTC and other topical medications as needed.  Voltaren/Diclofenac Gel    -We also discussed other future treatment options:  US guided wrist injections  EMG if numbness and tingling persists    Follow Up: as needed    Concerning signs and symptoms were reviewed and all questions were answered at this time.    Shea Brown MD [facility]  Sports Medicine Physician  [facility]    -----  Chief Complaint   Patient presents with    Right Wrist - Pain    Right Thumb - Pain       SUBJECTIVE  Sara Torresus is a/an 73 year old female who is seen as a self referral for evaluation of right thumb and right wrist.     The patient is seen by themselves.  The patient is Right handed    Onset: 1 month(s) ago. Reports insidious onset without acute precipitating event.  Location of Pain: right thumb, right wrist   Worsened by: grabbing, pulling, pushing off with Yoga, sleeping at night, driving (resting)  Better with: aleve  Treatments tried: Aleve and  "casting/splinting/bracing  Associated symptoms: tingling hand wrist and up the arm, weakness of right hand, and feeling of instability    Orthopedic/Surgical history: YES - Date: she has previously been seen by us on 5/16/22.  Had thumb dislocation when she was a child   Social History/Occupation: retired, likes Yoga       REVIEW OF SYSTEMS:  Review of Systems    OBJECTIVE:  Ht 1.626 m (5' 4\")   Wt 55.8 kg (123 lb)   LMP  (LMP Unknown)   BMI 21.11 kg/m     General: healthy, alert and in no distress  Skin: no suspicious lesions or rash.  CV: distal perfusion intact   Resp: normal respiratory effort without conversational dyspnea   Psych: normal mood and affect  Gait: NORMAL  Neuro: Normal light sensory exam of upper extremity    Right Wrist and Hand exam    Inspection:       No swelling or bruising, arthritis deformities bilaterally    Tender:       MCP joint right thumb, STT joint    Non Tender:       Remainder of the Wrist and Hand bilateral    ROM:       Full and symmetric active and passive range of motion of the forearm, wrist and digits bilateral    Strength:       5/5 strength in the muscles of the hand, wrist and forearm bilateral    Special Tests:        positive (+) Tinel's test right and       neg (-) Phalen's test bilateral    Neurovascular:       2+ radial pulses bilaterally with brisk capillary refill and      normal sensation to light touch in the radial, median and ulnar nerve distributions      RADIOLOGY:  Final results and radiologist's interpretation, available in the [facility] health record.  Images were reviewed with the patient in the office today.  My personal interpretation of the performed imaging:     3 XR views of right wrist and thumb reviewed: no acute bony abnormality, STT joint degenerative changes, MCP joint degenerative changes  - will follow official read    Review of the result(s) of each unique test - XR         "

## 2024-03-28 NOTE — PATIENT INSTRUCTIONS
Patient Education       Proper skin care from [facility]:    -Eliminate harsh soaps as they strip the natural oils from the skin, often resulting in dry itchy skin ( i.e. Dial, Zest, Turkish Spring)  -Use mild soaps such as Cetaphil or Dove Sensitive Skin in the shower. You do not need to use soap on arms, legs, and trunk every time you shower unless visibly soiled.   -Avoid hot or cold showers.  -After showering, lightly dry off and apply moisturizing within 2-3 minutes. This will help trap moisture in the skin.   -Aggressive use of a moisturizer at least 1-2 times a day to the entire body (including -Vanicream, Cetaphil, Aquaphor or Cerave) and moisturize hands after every washing.  -We recommend using moisturizers that come in a tub that needs to be scooped out, not a pump. This has more of an oil base. It will hold moisture in your skin much better than a water base moisturizer. The above recommended are non-pore clogging.      Wear a sunscreen with at least SPF 30 on your face, ears, neck and V of the chest daily. Wear sunscreen on other areas of the body if those areas are exposed to the sun throughout the day. Sunscreens can contain physical and/or chemical blockers. Physical blockers are less likely to clog pores, these include zinc oxide and titanium dioxide. Reapply every two hour and after swimming.     Sunscreen examples: https://www.ewg.org/sunscreen/    UV radiation  UVA radiation remains constant throughout the day and throughout the year. It is a longer wavelength than UVB and therefore penetrates deeper into the skin leading to immediate and delayed tanning, photoaging, and skin cancer. 70-80% of UVA and UVB radiation occurs between the hours of 10am-2pm.  UVB radiation  UVB radiation causes the most harmful effects and is more significant during the summer months. However, snow and ice can reflect UVB radiation leading to skin damage during the winter months as well. UVB radiation is  responsible for tanning, burning, inflammation, delayed erythema (pinkness), pigmentation (brown spots), and skin cancer.     I recommend self monthly full body exams and yearly full body exams with a dermatology provider. If you develop a new or changing lesion please follow up for examination. Most skin cancers are pink and scaly or pink and pearly. However, we do see blue/brown/black skin cancers.  Consider the ABCDEs of melanoma when giving yourself your monthly full body exam ( don't forget the groin, buttocks, feet, toes, etc). A-asymmetry, B-borders, C-color, D-diameter, E-elevation or evolving. If you see any of these changes please follow up in clinic. If you cannot see your back I recommend purchasing a hand held mirror to use with a larger wall mirror.       Checking for Skin Cancer  You can find cancer early by checking your skin each month. There are 3 kinds of skin cancer. They are melanoma, basal cell carcinoma, and squamous cell carcinoma. Doing monthly skin checks is the best way to find new marks or skin changes. Follow the instructions below for checking your skin.   The ABCDEs of checking moles for melanoma   Check your moles or growths for signs of melanoma using ABCDE:   Asymmetry: the sides of the mole or growth don t match  Border: the edges are ragged, notched, or blurred  Color: the color within the mole or growth varies  Diameter: the mole or growth is larger than 6 mm (size of a pencil eraser)  Evolving: the size, shape, or color of the mole or growth is changing (evolving is not shown in the images below)    Checking for other types of skin cancer  Basal cell carcinoma or squamous cell carcinoma have symptoms such as:     A spot or mole that looks different from all other marks on your skin  Changes in how an area feels, such as itching, tenderness, or pain  Changes in the skin's surface, such as oozing, bleeding, or scaliness  A sore that does not heal  New swelling or redness beyond  the border of a mole    Who s at risk?  Anyone can get skin cancer. But you are at greater risk if you have:   Fair skin, light-colored hair, or light-colored eyes  Many moles or abnormal moles on your skin  A history of sunburns from sunlight or tanning beds  A family history of skin cancer  A history of exposure to radiation or chemicals  A weakened immune system  If you have had skin cancer in the past, you are at risk for recurring skin cancer.   How to check your skin  Do your monthly skin checkups in front of a full-length mirror. Check all parts of your body, including your:   Head (ears, face, neck, and scalp)  Torso (front, back, and sides)  Arms (tops, undersides, upper, and lower armpits)  Hands (palms, backs, and fingers, including under the nails)  Buttocks and genitals  Legs (front, back, and sides)  Feet (tops, soles, toes, including under the nails, and between toes)  If you have a lot of moles, take digital photos of them each month. Make sure to take photos both up close and from a distance. These can help you see if any moles change over time.   Most skin changes are not cancer. But if you see any changes in your skin, call your doctor right away. Only he or she can diagnose a problem. If you have skin cancer, seeing your doctor can be the first step toward getting the treatment that could save your life.   [employer] last reviewed this educational content on 4/1/2019 2000-2020 [employer]. [address] [place] [address], [place] [address]. All rights reserved. This information is not intended as a substitute for professional medical care. Always follow your healthcare professional's instructions.       When should I call my doctor?  If you are worsening or not improving, please, contact us or seek urgent care as noted below.     Who should I call with questions (adults)?  [facility] (adult and pediatric): [phone]  [facility]  [place] (adult): [phone]  [facility] ([place], [place], [place] and [place]) [phone]  For urgent needs outside of business hours call the [facility] at [phone] and ask for the dermatology resident on call to be paged  If this is a medical emergency and you are unable to reach an ER, Call 911      If you need a prescription refill, please contact your pharmacy. Refills are approved or denied by our Physicians during normal business hours, Monday through Fridays  Per office policy, refills will not be granted if you have not been seen within the past year (or sooner depending on your child's condition)

## 2024-03-29 ENCOUNTER — ANCILLARY PROCEDURE (OUTPATIENT)
Dept: MAMMOGRAPHY | Facility: CLINIC | Age: 74
End: 2024-03-29
Attending: FAMILY MEDICINE
Payer: COMMERCIAL

## 2024-03-29 ENCOUNTER — OFFICE VISIT (OUTPATIENT)
Dept: DERMATOLOGY | Facility: CLINIC | Age: 74
End: 2024-03-29
Payer: COMMERCIAL

## 2024-03-29 DIAGNOSIS — Z12.31 VISIT FOR SCREENING MAMMOGRAM: ICD-10-CM

## 2024-03-29 DIAGNOSIS — L57.0 AK (ACTINIC KERATOSIS): Primary | ICD-10-CM

## 2024-03-29 DIAGNOSIS — L82.0 INFLAMED SEBORRHEIC KERATOSIS: ICD-10-CM

## 2024-03-29 PROCEDURE — 77067 SCR MAMMO BI INCL CAD: CPT | Mod: TC | Performed by: RADIOLOGY

## 2024-03-29 PROCEDURE — 17110 DESTRUCTION B9 LES UP TO 14: CPT | Performed by: NURSE PRACTITIONER

## 2024-03-29 PROCEDURE — 77063 BREAST TOMOSYNTHESIS BI: CPT | Mod: TC | Performed by: RADIOLOGY

## 2024-03-29 PROCEDURE — 99212 OFFICE O/P EST SF 10 MIN: CPT | Mod: 25 | Performed by: NURSE PRACTITIONER

## 2024-03-29 NOTE — PROGRESS NOTES
[facility] Dermatology Note  Encounter Date: Mar 29, 2024  Office Visit     Reviewed patients past medical history and pertinent chart review prior to patients visit today.     Dermatology Problem List:  AK nose, cryo.  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  Lesions are still in the healing process.  Advised to recheck at her appointment in June for any residual lesions.    # Irritated and inflamed Seborrheic Keratosis, back. Discussed treatment options with patient including no treatment, cryotherapy, and shave removal. Patient prefers cryotherapy today due to irritation and itching. After verbal consent and discussion of risks and benefits including but no limited to dyspigmentation/scar, blister, and pain, 4 was(were) treated with 1-2mm freeze border for 2 cycles with liquid nitrogen. Post cryotherapy instructions were provided.         Neema Fofana CNP  Dermatology    _______________________________________    CC: Derm Problem (Spot check)    HPI:  Ms. Sara Keita is a(n) 73 year old female who presents today as a return patient for recheck of her nose post Efudex/calcipotriene treatment.  She says she treated from March 10 to March 18 and got a lot of inflammation on the nose.  It is healing now.  She says it still very dry and wonders if this is normal.  She also has some itchy and irritated spots on her back especially along the bra line that wonders if they can be treated.    Patient is otherwise feeling well, without additional skin concerns.      Physical Exam:  SKIN: Focused examination of face and back was performed.  -Nose has some mild postinflammatory erythema and a few healing erosions  -Scattered brown waxy and scaly papules on the back    - No other lesions of concern on  areas examined.     Medications:  Current Outpatient Medications   Medication    albuterol (PROAIR HFA/PROVENTIL HFA/VENTOLIN HFA) 108 (90 Base) MCG/ACT inhaler    alendronate (FOSAMAX) 70 MG tablet    atorvastatin (LIPITOR) 20 MG tablet    citalopram (CELEXA) 20 MG tablet    COMPOUNDED NON-CONTROLLED SUBSTANCE (CMPD RX) - PHARMACY TO MIX COMPOUNDED MEDICATION    cyclobenzaprine (FLEXERIL) 10 MG tablet    diclofenac (VOLTAREN) 1 % topical gel    guaiFENesin-codeine (ROBITUSSIN AC) 100-10 MG/5ML solution    multivitamin w/minerals (MULTI-VITAMIN) tablet    predniSONE (DELTASONE) 20 MG tablet    triamcinolone (KENALOG) 0.1 % external cream    Turmeric 500 MG TABS    Vitamin D, Cholecalciferol, 25 MCG (1000 UT) CAPS    zolpidem (AMBIEN) 5 MG tablet     No current facility-administered medications for this visit.      Past Medical History:   Patient Active Problem List   Diagnosis    Microscopic colitis, unspecified microscopic colitis type    Osteopenia of lumbar spine    Elevated cholesterol    Other migraine without status migrainosus, not intractable    Insomnia, unspecified type    Herpes zoster without complication    Anxiety    Combined forms of age-related cataract, mild, of both eyes    Patellar instability     Past Medical History:   Diagnosis Date    Squamous cell carcinoma of skin, unspecified        CC No referring provider defined for this encounter. on close of this encounter.

## 2024-03-29 NOTE — LETTER
3/29/2024         RE: Sara Keita  [id number] [facility] [facility] [id number]        Dear Colleague,    Thank you for referring your patient, Sara Keita, to the [facility]. Please see a copy of my visit note below.    [facility] Dermatology Note  Encounter Date: Mar 29, 2024  Office Visit     Reviewed patients past medical history and pertinent chart review prior to patients visit today.     Dermatology Problem List:  AK nose, cryo.  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  Lesions are still in the healing process.  Advised to recheck at her appointment in June for any residual lesions.    # Irritated and inflamed Seborrheic Keratosis, back. Discussed treatment options with patient including no treatment, cryotherapy, and shave removal. Patient prefers cryotherapy today due to irritation and itching. After verbal consent and discussion of risks and benefits including but no limited to dyspigmentation/scar, blister, and pain, 4 was(were) treated with 1-2mm freeze border for 2 cycles with liquid nitrogen. Post cryotherapy instructions were provided.         Neema Fofana, KURT  Dermatology    _______________________________________    CC: Derm Problem (Spot check)    HPI:  Ms. Sara Keita is a(n) 73 year old female who presents today as a return patient for recheck of her nose post Efudex/calcipotriene treatment.  She says she treated from March 10 to March 18 and got a lot of inflammation on the nose.  It is healing now.  She says it still very dry and wonders if this is normal.  She also has some itchy and irritated spots on her back especially along the bra line that wonders if they can be treated.    Patient is otherwise feeling well, without additional skin  concerns.      Physical Exam:  SKIN: Focused examination of face and back was performed.  -Nose has some mild postinflammatory erythema and a few healing erosions  -Scattered brown waxy and scaly papules on the back    - No other lesions of concern on areas examined.     Medications:  Current Outpatient Medications   Medication     albuterol (PROAIR HFA/PROVENTIL HFA/VENTOLIN HFA) 108 (90 Base) MCG/ACT inhaler     alendronate (FOSAMAX) 70 MG tablet     atorvastatin (LIPITOR) 20 MG tablet     citalopram (CELEXA) 20 MG tablet     COMPOUNDED NON-CONTROLLED SUBSTANCE (CMPD RX) - PHARMACY TO MIX COMPOUNDED MEDICATION     cyclobenzaprine (FLEXERIL) 10 MG tablet     diclofenac (VOLTAREN) 1 % topical gel     guaiFENesin-codeine (ROBITUSSIN AC) 100-10 MG/5ML solution     multivitamin w/minerals (MULTI-VITAMIN) tablet     predniSONE (DELTASONE) 20 MG tablet     triamcinolone (KENALOG) 0.1 % external cream     Turmeric 500 MG TABS     Vitamin D, Cholecalciferol, 25 MCG (1000 UT) CAPS     zolpidem (AMBIEN) 5 MG tablet     No current facility-administered medications for this visit.      Past Medical History:   Patient Active Problem List   Diagnosis     Microscopic colitis, unspecified microscopic colitis type     Osteopenia of lumbar spine     Elevated cholesterol     Other migraine without status migrainosus, not intractable     Insomnia, unspecified type     Herpes zoster without complication     Anxiety     Combined forms of age-related cataract, mild, of both eyes     Patellar instability     Past Medical History:   Diagnosis Date     Squamous cell carcinoma of skin, unspecified        CC No referring provider defined for this encounter. on close of this encounter.       Again, thank you for allowing me to participate in the care of your patient.        Sincerely,        Vicki Fofana, JHON CNP

## 2024-04-01 PROBLEM — H02.729 MADAROSIS OF EYELID: Status: RESOLVED | Noted: 2022-02-08 | Resolved: 2023-12-06

## 2024-04-04 ENCOUNTER — THERAPY VISIT (OUTPATIENT)
Dept: OCCUPATIONAL THERAPY | Facility: CLINIC | Age: 74
End: 2024-04-04
Attending: PEDIATRICS
Payer: COMMERCIAL

## 2024-04-04 DIAGNOSIS — M25.531 RIGHT WRIST PAIN: ICD-10-CM

## 2024-04-04 DIAGNOSIS — M79.644 PAIN OF RIGHT THUMB: ICD-10-CM

## 2024-04-04 DIAGNOSIS — M19.031 ARTHRITIS OF SCAPHOID-TRAPEZIUM-TRAPEZOID JOINT OF RIGHT HAND: ICD-10-CM

## 2024-04-04 DIAGNOSIS — M19.049 HAND ARTHRITIS: ICD-10-CM

## 2024-04-04 PROCEDURE — 97760 ORTHOTIC MGMT&TRAING 1ST ENC: CPT | Mod: GO | Performed by: OCCUPATIONAL THERAPIST

## 2024-04-04 PROCEDURE — 97535 SELF CARE MNGMENT TRAINING: CPT | Mod: GO | Performed by: OCCUPATIONAL THERAPIST

## 2024-04-04 PROCEDURE — 97165 OT EVAL LOW COMPLEX 30 MIN: CPT | Mod: GO | Performed by: OCCUPATIONAL THERAPIST

## 2024-04-04 NOTE — PROGRESS NOTES
OCCUPATIONAL THERAPY EVALUATION  Type of Visit: Evaluation    See electronic medical record for Abuse and Falls Screening details.    Subjective      Presenting condition or subjective complaint: R thumb and wrist pain, tingling, casuing shoulder pain  Date of onset: 03/21/24 (therapy referral)    Relevant medical history: Bladder or bowel problems; Osteoarthritis; Osteoporosis   Dates & types of surgery: sinus    Prior diagnostic imaging/testing results: X-ray     Prior therapy history for the same diagnosis, illness or injury: Yes for R shoulder 2022    Prior Level of Function  Transfers: Independent  Ambulation: Independent  ADL: Independent    Living Environment  Social support: Alone   Type of home: House; 1 level   Stairs to enter the home: No       Ramp: No   Stairs inside the home: No       Help at home: None    Employment: No retired  Hobbies/Interests: hiking, walking, jogging, travel, gardening, reading, volunteering, family    Patient goals for therapy: Yoga, drive, hold iPad    Pain assessment: Pain present  See objective evaluation for additional pain details     Objective     ADDITIONAL HISTORY:  Right hand dominant  Patient reports symptoms of pain, weakness/loss of strength, and tingling  of the right wrist and thumb with insidious onset over the past 2 months  Transportation: drives    Functional Outcome Measure:   Functional Outcome Measure:  Upper Extremity Functional Index  SCORE:   Column Totals: 51/80  (A lower score indicates greater disability.)    Pain Level (Scale 0-10)   4/4/2024   At Rest 0   With Use 5     Pain Description  Date 4/4/2024   Location wrist and thumb   Pain Quality Aching   Frequency intermittent     Pain is worst  Daytime and going to sleeping   Exacerbated by  Weight bearing, gripping, driving, brushing teeth   Relieved by NSAIDs and splint wear   Progression Gradually worsening     ROM  Thumb 4/4/2024 4/4/2024   AROM  (PROM) Left Right   RABD 45 35   PABD 45 40    Retropulsion (cm) 6 6     Thumb Observation/Appearance   - none  + mild    ++ moderate    +++ severe     4/4/2024   Shoulder deformity present over CMC + slight   Edema over the CMC joint ++ volar      Provocative Tests  Pain Report:  - none    + mild    ++ moderate    +++ severe     4/4/2024   CMC Grind test -   Crepitus present -   CMC Adduction Stress Test -   CMC Extension Stress Test ++   Finkelstein's -   Tinel's at CT +   Phalens + 30 secs       Strength   (Measured in pounds)  Pain Report: - none  + mild    ++ moderate    +++ severe    4/4/2024 4/4/2024   Trials Left Right   1  2  3 44  43  42 42-  45-  46-   Average 43 44     Lat Pinch 4/4/2024 4/4/2024   Trials Left Right   1  2  3 11  11  11 8+  7+  7++   Average 11 7     3 Pt Pinch 4/4/2024 4/4/2024   Trials Left Right   1  2  3 8  9  8 8-  9-  8+   Average 8 8     Palpation   Pain Report:  - none    + mild    ++ moderate    +++ severe    4/4/2024   CMC Joint Line + slight, volar   Thenar Maple -   Web Space ++   1st DC + slight   Radial Styloid + slgiht   FCR +     Assessment & Plan   CLINICAL IMPRESSIONS  Medical Diagnosis: R wrist and thumb pain, STT arthritis    Treatment Diagnosis: R wrist and thumb pain    Impression/Assessment: Pt is a 73 year old female presenting to Occupational Therapy due to right wrist and thumb pain.  Patient's limitations or Problem List includes: Pain, Decreased ROM/motion, Increased edema, Weakness, Decreased stability, Decreased , Decreased pinch, and Tightness in musculature of the right wrist and thumb which interferes with the patient's ability to perform Self Care Tasks (eating, bathing), Work Tasks, Sleep Patterns, Recreational Activities, Household Chores, and Driving  as compared to previous level of function.    Clinical Decision Making (Complexity):  Assessment of Occupational Performance: 5 or more Performance Deficits  Occupational Performance Limitations: dressing, hygiene and grooming, driving  and community mobility, home establishment and management, meal preparation and cleanup, sleep, work, and leisure activities  Clinical Decision Making (Complexity): Low complexity    PLAN OF CARE  Treatment Interventions:  Modalities:  US and Paraffin  Therapeutic Exercise:  AROM, Tendon Gliding, Isotonics, Isometrics, and Stabilization  Manual Techniques:  Joint mobilization and Myofascial release  Orthotic Fabrication:  Static, Hand and Forearm based  Self Care:  Self Care Tasks, Ergonomic Considerations, Joint Protection and Adaptive Equipment Education     Long Term Goals   OT Goal 1  Goal Identifier: Gripping  Goal Description: Pt will report decreased pain and increased strength to be able to open a jar with mild difficulty with use of AE, UEFI 3/4  Rationale: In order to maximize safety and independence with ADL/IADLs  Goal Progress: extreme difficulty, UEFI 0/4  Target Date: 06/02/24      Frequency of Treatment: 2 x per month  Duration of Treatment: 2 months     Recommended Referrals to Other Professionals:  NA  Education Assessment: Learner/Method: Patient;Demonstration;Pictures/Video  Education Comments: printed PTRx     Risks and benefits of evaluation/treatment have been explained.   Patient/Family/caregiver agrees with Plan of Care.     Evaluation Time:    OT Eval, Low Complexity Minutes (59104): 20   Present: Not applicable     Signing Clinician: Tona Lange OT      [facility]                                                                                   OUTPATIENT OCCUPATIONAL THERAPY      PLAN OF TREATMENT FOR OUTPATIENT REHABILITATION   Patient's Last Name, First Name, Sara Jacob YOB: 1950   Provider's Name   [facility]   Medical Record No.  [id number]     Onset Date: 03/21/24 (therapy referral) Start of Care Date: 04/04/24     Medical Diagnosis:  R wrist and thumb pain, STT arthritis      OT  Treatment Diagnosis:  R wrist and thumb pain Plan of Treatment  Frequency/Duration:2 x per month/2 months    Certification date from 04/04/24   To 06/02/24        See note for plan of treatment details and functional goals     Tona Lange OT                         I CERTIFY THE NEED FOR THESE SERVICES FURNISHED UNDER        THIS PLAN OF TREATMENT AND WHILE UNDER MY CARE     (Physician attestation of this document indicates review and certification of the therapy plan).              Referring Provider:  Shea Brown    Initial Assessment  See Epic Evaluation- 04/04/24

## 2024-04-10 ENCOUNTER — THERAPY VISIT (OUTPATIENT)
Dept: OCCUPATIONAL THERAPY | Facility: CLINIC | Age: 74
End: 2024-04-10
Attending: PEDIATRICS
Payer: COMMERCIAL

## 2024-04-10 DIAGNOSIS — M19.049 HAND ARTHRITIS: ICD-10-CM

## 2024-04-10 DIAGNOSIS — M25.531 RIGHT WRIST PAIN: Primary | ICD-10-CM

## 2024-04-10 DIAGNOSIS — M79.644 PAIN OF RIGHT THUMB: ICD-10-CM

## 2024-04-10 DIAGNOSIS — M19.031 ARTHRITIS OF SCAPHOID-TRAPEZIUM-TRAPEZOID JOINT OF RIGHT HAND: ICD-10-CM

## 2024-04-10 PROCEDURE — 97140 MANUAL THERAPY 1/> REGIONS: CPT | Mod: GO | Performed by: OCCUPATIONAL THERAPIST

## 2024-04-10 PROCEDURE — 97110 THERAPEUTIC EXERCISES: CPT | Mod: GO | Performed by: OCCUPATIONAL THERAPIST

## 2024-06-03 PROBLEM — M79.644 PAIN OF RIGHT THUMB: Status: RESOLVED | Noted: 2024-04-04 | Resolved: 2024-06-03

## 2024-06-03 PROBLEM — M19.031 ARTHRITIS OF SCAPHOID-TRAPEZIUM-TRAPEZOID JOINT OF RIGHT HAND: Status: RESOLVED | Noted: 2024-04-04 | Resolved: 2024-06-03

## 2024-06-03 PROBLEM — M25.531 RIGHT WRIST PAIN: Status: RESOLVED | Noted: 2024-04-04 | Resolved: 2024-06-03

## 2024-06-03 PROBLEM — M19.049 HAND ARTHRITIS: Status: RESOLVED | Noted: 2024-04-04 | Resolved: 2024-06-03

## 2024-06-24 ENCOUNTER — OFFICE VISIT (OUTPATIENT)
Dept: OPTOMETRY | Facility: CLINIC | Age: 74
End: 2024-06-24
Payer: COMMERCIAL

## 2024-06-24 DIAGNOSIS — H52.13 MYOPIA OF BOTH EYES: ICD-10-CM

## 2024-06-24 DIAGNOSIS — Z46.0 CONTACT LENS/GLASSES FITTING: Primary | ICD-10-CM

## 2024-06-24 PROCEDURE — 92310 CONTACT LENS FITTING OU: CPT | Mod: GA | Performed by: OPTOMETRIST

## 2024-06-24 ASSESSMENT — REFRACTION_WEARINGRX
OD_CYLINDER: +1.00
OS_ADD: +3.00
OD_ADD: +3.00
OD_CYLINDER: SPHERE
OD_SPHERE: +2.50
OS_CYLINDER: +0.25
SPECS_TYPE: OTC CHEATERS
OD_AXIS: 016
OD_SPHERE: -3.25
OS_SPHERE: +2.50
SPECS_TYPE: PAL
OS_CYLINDER: SPHERE
OS_SPHERE: -2.75
OS_AXIS: 144

## 2024-06-24 ASSESSMENT — EXTERNAL EXAM - RIGHT EYE: OD_EXAM: 1+ BROW PTOSIS

## 2024-06-24 ASSESSMENT — CONF VISUAL FIELD
OS_INFERIOR_NASAL_RESTRICTION: 0
OS_SUPERIOR_NASAL_RESTRICTION: 0
OS_NORMAL: 1
OS_SUPERIOR_TEMPORAL_RESTRICTION: 0
OS_INFERIOR_TEMPORAL_RESTRICTION: 0
OD_SUPERIOR_NASAL_RESTRICTION: 0
OD_NORMAL: 1
OD_INFERIOR_TEMPORAL_RESTRICTION: 0
OD_SUPERIOR_TEMPORAL_RESTRICTION: 0
METHOD: COUNTING FINGERS
OD_INFERIOR_NASAL_RESTRICTION: 0

## 2024-06-24 ASSESSMENT — EXTERNAL EXAM - LEFT EYE: OS_EXAM: 1+ BROW PTOSIS

## 2024-06-24 ASSESSMENT — VISUAL ACUITY
METHOD: SNELLEN - LINEAR
OD_CC: 20/40
CORRECTION_TYPE: CONTACTS
OS_CC: 20/60
OS_CC+: -2
OS_CC: 20/30
OD_CC: 20/30

## 2024-06-24 ASSESSMENT — SLIT LAMP EXAM - LIDS
COMMENTS: 2+ MEIBOMIAN GLAND DYSFUNCTION
COMMENTS: 2+ MEIBOMIAN GLAND DYSFUNCTION

## 2024-06-24 ASSESSMENT — REFRACTION_CURRENTRX
OD_SPHERE: -2.25
OS_SPHERE: -2.00
OS_BRAND: COOPER BIOFINITY TORIC BC 8.7, D 14.5
OS_CYLINDER: -0.75
OD_BRAND: COOPER BIOFINITY BC 8.6, D 14.0
OS_AXIS: 060
OD_CYLINDER: SPHERE

## 2024-06-24 NOTE — LETTER
6/24/2024      Sara Keita  [address] [facility] [address]      Dear Colleague,    Thank you for referring your patient, Sara Keita, to the [facility]. Please see a copy of my visit note below.    Chief Complaint   Patient presents with     New Eval For Contact Lens        Previous contact lens wearer? Yes: Biofinity - sph right eye, toric left eye - occasionally only uses a lens in right eye     Comfort of contact lenses : Good   Satisfied with current lenses: Yes - would like to update CL Rx and stick with same brand - would like to discuss monovision vs MF vs SVL distance     -OK with $75 fitting fee     Last Eye Exam: 10/25/2023 - Dr. Barron - knows she is early - concerned about insurance coverage so she just wants CL fitting today      What are you currently using to see?  Glasses (PAL's) , +2.50 readers, and contacts - wears CL's most of the time     Distance Vision Acuity: More trouble with glare at night    Near Vision Acuity: Satisfied with vision while reading and using computer with readers over CL's and with glasses    Eye Comfort: Red, dry   Do you use eye drops? : Yes: Lumify, Refresh Preservative Free At's      Ellyn Mancia  Optometry Assistant     Medical, surgical and family histories reviewed and updated 6/24/2024.       OBJECTIVE: See Ophthalmology exam    ASSESSMENT:    ICD-10-CM    1. Contact lens/glasses fitting  Z46.0 CONTACT LENS FITTING,BILAT w/ signed waiver      2. Myopia of both eyes  H52.13 CONTACT LENS FITTING,BILAT w/ signed waiver         PLAN:     Patient Instructions   Updated contact lens prescription provided today.     Trial new lenses before ordering more to make sure they work well for you.     Maximum wear-time of 12 hours/day of the contact lenses.   Clean the lenses nightly in contact lens solution.   No sleeping or swimming in the contact lenses.     Return in ~ 4 months for comprehensive eye exam.       Reynaldo Barron, OD  [facility]  Emely David  [address] [place] [address]  [place]  [address]    [phone]              Again, thank you for allowing me to participate in the care of your patient.        Sincerely,        Reynaldo Barron OD

## 2024-06-24 NOTE — PATIENT INSTRUCTIONS
Updated contact lens prescription provided today.     Trial new lenses before ordering more to make sure they work well for you.     Maximum wear-time of 12 hours/day of the contact lenses.   Clean the lenses nightly in contact lens solution.   No sleeping or swimming in the contact lenses.     Return in ~ 4 months for comprehensive eye exam.       Reynaldo Barron OD  [address] [place]  [address] [phone]

## 2024-06-24 NOTE — PROGRESS NOTES
Chief Complaint   Patient presents with    New Eval For Contact Lens        Previous contact lens wearer? Yes: Biofinity - sph right eye, toric left eye - occasionally only uses a lens in right eye     Comfort of contact lenses : Good   Satisfied with current lenses: Yes - would like to update CL Rx and stick with same brand - would like to discuss monovision vs MF vs SVL distance     -OK with $75 fitting fee     Last Eye Exam: 10/25/2023 - Dr. Barron - knows she is early - concerned about insurance coverage so she just wants CL fitting today      What are you currently using to see?  Glasses (PAL's) , +2.50 readers, and contacts - wears CL's most of the time     Distance Vision Acuity: More trouble with glare at night    Near Vision Acuity: Satisfied with vision while reading and using computer with readers over CL's and with glasses    Eye Comfort: Red, dry   Do you use eye drops? : Yes: Lumify, Refresh Preservative Free At's      Ellyn Mancia  Optometry Assistant     Medical, surgical and family histories reviewed and updated 6/24/2024.       OBJECTIVE: See Ophthalmology exam    ASSESSMENT:    ICD-10-CM    1. Contact lens/glasses fitting  Z46.0 CONTACT LENS FITTING,BILAT w/ signed waiver      2. Myopia of both eyes  H52.13 CONTACT LENS FITTING,BILAT w/ signed waiver         PLAN:     Patient Instructions   Updated contact lens prescription provided today.     Trial new lenses before ordering more to make sure they work well for you.     Maximum wear-time of 12 hours/day of the contact lenses.   Clean the lenses nightly in contact lens solution.   No sleeping or swimming in the contact lenses.     Return in ~ 4 months for comprehensive eye exam.       Reynaldo Barron, OD  [address] [place]  [address]    [phone]

## 2024-06-26 ENCOUNTER — OFFICE VISIT (OUTPATIENT)
Dept: DERMATOLOGY | Facility: CLINIC | Age: 74
End: 2024-06-26
Payer: COMMERCIAL

## 2024-06-26 DIAGNOSIS — D22.9 MULTIPLE BENIGN NEVI: ICD-10-CM

## 2024-06-26 DIAGNOSIS — D18.01 CHERRY ANGIOMA: Primary | ICD-10-CM

## 2024-06-26 DIAGNOSIS — L81.4 LENTIGO: ICD-10-CM

## 2024-06-26 DIAGNOSIS — L82.1 SEBORRHEIC KERATOSIS: ICD-10-CM

## 2024-06-26 DIAGNOSIS — Z85.89 HISTORY OF SQUAMOUS CELL CARCINOMA: ICD-10-CM

## 2024-06-26 DIAGNOSIS — L82.0 INFLAMED SEBORRHEIC KERATOSIS: ICD-10-CM

## 2024-06-26 PROCEDURE — 99213 OFFICE O/P EST LOW 20 MIN: CPT | Mod: 25 | Performed by: PHYSICIAN ASSISTANT

## 2024-06-26 PROCEDURE — 17110 DESTRUCTION B9 LES UP TO 14: CPT | Performed by: PHYSICIAN ASSISTANT

## 2024-06-26 NOTE — LETTER
2024      Sara Keita  [address] [facility] [address]      Dear Colleague,    Thank you for referring your patient, Sara Keita, to the [facility]. Please see a copy of my visit note below.    Sara Keita is a pleasant 73 year old year old female patient here today for skin check. She has SCC removed from left posterior thigh in 2019. She did compounded efudex this spring, which did well. She notes some rough areas on face that are getting irritated. No pain or bleeding.  Patient has no other skin complaints today.  Remainder of the HPI, Meds, PMH, Allergies, FH, and SH was reviewed in chart.    Pertinent Hx:   History of SCC on left thigh   Past Medical History:   Diagnosis Date     Hand arthritis 2024     Squamous cell carcinoma of skin, unspecified        Past Surgical History:   Procedure Laterality Date     COLONOSCOPY  2016    Collagenous colitis     ENT SURGERY  20 yr ago    For migraines        Family History   Problem Relation Age of Onset     Diabetes Father         Adult onset at age 86     Glaucoma No family hx of      Macular Degeneration No family hx of        Social History     Socioeconomic History     Marital status: Single     Spouse name: Not on file     Number of children: Not on file     Years of education: Not on file     Highest education level: Not on file   Occupational History     Not on file   Tobacco Use     Smoking status: Former     Current packs/day: 0.00     Average packs/day: 1 pack/day for 7.0 years (7.0 ttl pk-yrs)     Types: Cigarettes     Start date: 1966     Quit date: 1973     Years since quittin.5     Smokeless tobacco: Never     Tobacco comments:     I smoked from  to    Vaping Use     Vaping status: Never Used   Substance and Sexual Activity     Alcohol use: Not Currently     Drug use: Never     Sexual activity: Not Currently     Partners: Male     Birth control/protection: None   Other Topics  Concern     Parent/sibling w/ CABG, MI or angioplasty before 65F 55M? No   Social History Narrative     Not on file     Social Determinants of Health     Financial Resource Strain: Low Risk  (12/26/2023)    Financial Resource Strain      Within the past 12 months, have you or your family members you live with been unable to get utilities (heat, electricity) when it was really needed?: No   Food Insecurity: Low Risk  (12/26/2023)    Food Insecurity      Within the past 12 months, did you worry that your food would run out before you got money to buy more?: No      Within the past 12 months, did the food you bought just not last and you didn t have money to get more?: No   Transportation Needs: Low Risk  (12/26/2023)    Transportation Needs      Within the past 12 months, has lack of transportation kept you from medical appointments, getting your medicines, non-medical meetings or appointments, work, or from getting things that you need?: No   Physical Activity: Not on file   Stress: Not on file   Social Connections: Not on file   Interpersonal Safety: Low Risk  (12/26/2023)    Interpersonal Safety      Do you feel physically and emotionally safe where you currently live?: Yes      Within the past 12 months, have you been hit, slapped, kicked or otherwise physically hurt by someone?: No      Within the past 12 months, have you been humiliated or emotionally abused in other ways by your partner or ex-partner?: No   Housing Stability: Low Risk  (12/26/2023)    Housing Stability      Do you have housing? : Yes      Are you worried about losing your housing?: No       Outpatient Encounter Medications as of 6/26/2024   Medication Sig Dispense Refill     albuterol (PROAIR HFA/PROVENTIL HFA/VENTOLIN HFA) 108 (90 Base) MCG/ACT inhaler INHALE 2 PUFF BY MOUTH FOUR TIMES DAILY AS NEEDED FOR BREATHING       alendronate (FOSAMAX) 70 MG tablet TAKE 1 TABLET BY MOUTH WEEKLY  WITH 8 OZ OF PLAIN WATER 30  MINUTES BEFORE FIRST FOOD,  DRINK OR MEDS. STAY UPRIGHT FOR 30  MINS 12 tablet 0     atorvastatin (LIPITOR) 20 MG tablet Take 1 tablet (20 mg) by mouth daily 100 tablet 3     citalopram (CELEXA) 20 MG tablet Take 1 tablet (20 mg) by mouth daily 90 tablet 3     COMPOUNDED NON-CONTROLLED SUBSTANCE (CMPD RX) - PHARMACY TO MIX COMPOUNDED MEDICATION Apply to face BID x5-10 days, wash hands after applying. Avoid sun 30 g 1     cyclobenzaprine (FLEXERIL) 10 MG tablet Take 1 tablet (10 mg) by mouth 3 times daily as needed for muscle spasms 20 tablet 1     diclofenac (VOLTAREN) 1 % topical gel Apply topically as needed for moderate pain       guaiFENesin-codeine (ROBITUSSIN AC) 100-10 MG/5ML solution Take 5-10 mLs by mouth every 4 hours as needed for cough (may cause drowsiness) (Patient not taking: Reported on 12/26/2023) 118 mL 0     multivitamin w/minerals (MULTI-VITAMIN) tablet Take 1 tablet by mouth daily       predniSONE (DELTASONE) 20 MG tablet Take 3 tabs by mouth daily x 3 days, then 2 tabs daily x 3 days, then 1 tab daily x 3 days, then 1/2 tab daily x 3 days. (Patient not taking: Reported on 12/26/2023) 20 tablet 0     triamcinolone (KENALOG) 0.1 % external cream Apply topically 2 times daily (Patient taking differently: Apply topically 2 times daily Only using PRN) 30 g 3     Turmeric 500 MG TABS Take by mouth 2 times daily       Vitamin D, Cholecalciferol, 25 MCG (1000 UT) CAPS        zolpidem (AMBIEN) 5 MG tablet Take 1 tablet (5 mg) by mouth nightly as needed for sleep 30 tablet 0     No facility-administered encounter medications on file as of 6/26/2024.             O:   NAD, WDWN, Alert & Oriented, Mood & Affect wnl, Vitals stable   Here today alone   LMP  (LMP Unknown)    General appearance normal   Vitals stable   Alert, oriented and in no acute distress      Brown stuck papules on face   Stuck on papules and brown macules on trunk and ext   Red papules on trunk  Brown papules and macules with regular pigment network and borders on  torso and extremities     The remainder of skin exam is normal       Eyes: Conjunctivae/lids:Normal     ENT: Lips: normal    MSK:Normal    Cardiovascular: peripheral edema none    Pulm: Breathing Normal    Neuro/Psych: Orientation:Alert and Orientedx3 ; Mood/Affect:normal   A/P:  1. Inflamed seborrheic keratosis on forehead, right temple and right shoulder x 3   LN2:  Treated with LN2 for 5s for 1-2 cycles. Warned risks of blistering, pain, pigment change, scarring, and incomplete resolution.  Advised patient to return if lesions do not completely resolve.  Wound care sheet given.  2.Seborrheic keratosis, lentigo, angioma, benign nevi, history of SCC  BENIGN LESIONS DISCUSSED WITH PATIENT:  I discussed the specifics of tumor, prognosis, and genetics of benign lesions.  I explained that treatment of these lesions would be purely cosmetic and not medically neccessary.  I discussed with patient different removal options including excision, cautery and /or laser.      Nature and genetics of benign skin lesions dicussed with patient.  Signs and Symptoms of skin cancer discussed with patient.  ABCDEs of melanoma reviewed with patient.  Patient encouraged to perform monthly skin exams.  UV precautions reviewed with patient.  Risks of non-melanoma skin cancer discussed with patient   Return to clinic in one year or sooner if needed.       Again, thank you for allowing me to participate in the care of your patient.        Sincerely,        Lynne Odom PA-C

## 2024-06-26 NOTE — PROGRESS NOTES
Sara Keita is a pleasant 73 year old year old female patient here today for skin check. She has SCC removed from left posterior thigh in 2019. She did compounded efudex this spring, which did well. She notes some rough areas on face that are getting irritated. No pain or bleeding.  Patient has no other skin complaints today.  Remainder of the HPI, Meds, PMH, Allergies, FH, and SH was reviewed in chart.    Pertinent Hx:   History of SCC on left thigh   Past Medical History:   Diagnosis Date    Hand arthritis 2024    Squamous cell carcinoma of skin, unspecified        Past Surgical History:   Procedure Laterality Date    COLONOSCOPY  2016    Collagenous colitis    ENT SURGERY  20 yr ago    For migraines        Family History   Problem Relation Age of Onset    Diabetes Father         Adult onset at age 86    Glaucoma No family hx of     Macular Degeneration No family hx of        Social History     Socioeconomic History    Marital status: Single     Spouse name: Not on file    Number of children: Not on file    Years of education: Not on file    Highest education level: Not on file   Occupational History    Not on file   Tobacco Use    Smoking status: Former     Current packs/day: 0.00     Average packs/day: 1 pack/day for 7.0 years (7.0 ttl pk-yrs)     Types: Cigarettes     Start date: 1966     Quit date: 1973     Years since quittin.5    Smokeless tobacco: Never    Tobacco comments:     I smoked from  to    Vaping Use    Vaping status: Never Used   Substance and Sexual Activity    Alcohol use: Not Currently    Drug use: Never    Sexual activity: Not Currently     Partners: Male     Birth control/protection: None   Other Topics Concern    Parent/sibling w/ CABG, MI or angioplasty before 65F 55M? 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 Dc'd with Krause and 30 day cardiac monitor, plan for potential CEA outpatient.     Writer did complete entire assessment with pt's daughter. Writer attempted to reach [facility] to confirm above equipment and medication changes, but had to leave a message for Rosa Maria OBANDO.    Per daughter pt is doing okay. He is A&O x3, and denies any remaining side effects from stroke. Pt is still weak and fatigues with activity but all other symptoms were denied. He is using his WC more frequently but does have a walker available. There have been no reported issues with krause catheter and daughter believes he is wearing the NORMA. Writer discussed s/s of stroke, UTI, and general infection and when to reach out to provider or seek further evaluation. She expressed understanding. She confirmed upcoming appt's and stated that the family assists with transportation to and from appt's as the facility transportation does not go outside of [place]. The only appt that was not set up prior to DC was the neuro f/u. Pt's daughter expressed wanting follow up closer to [place], writer gave her the number to call neuro and let her know she could express this request to them to see if they can accommodate. She is aware that the recommendation is for 2 wks post Dc.     Care Transitions Assessment    Utilization:  Visit Hospital Last 30 Days: Unplanned inpatient admission   Perception of Change in Health Status D/C: Improving  Discharged To:    Discharge Instructions: Received discharge instructions; Understands d/c instructions  Transition of Care Information Provided: Providers notified of BRUCE  Phone Call to Facility to Check Status: Follow-up scheduled    Medications:  Prescriptions: See AVS  IP/Amb Med List  Reviewed with Patient: No (facility handles writer left message for RN to call back)  Questions About Meds Prescribed at D/C: What its for    Follow-up Care:  Appointments: TCM, Vascular, EP, and Neuro  Provider Appt Scheduled Prior to D/C: Yes  Provider Appt Date: 03/07/23  Type of Provider: PCP     Appt Scheduled Prior to D/C: Yes  Specialist Appt Date: 03/31/23  Type of Specialist: Vascular, EP 4/19, and Neuro not scheduled (rec 2wk f/u) writer gave # to Pt's daughter and she stated she would call    Follow-up Appt Status: Confirms scheduled appt; Has not had opportunity to call for appt    Skilled Services: Yes  Skilled Services Options: Nursing; Home health aide/homemaker (through [facility])    Equipment/DME:   DME Type: Grab Bar; Pulse Oximeter; BP Monitor; Shower Chair; Walker; Wheelchair  Patients reported confidence in appropriate use of DME? Confident    Review of Systems:   Care Transition System Evaluation:   Fever: is not present   Pain:   0  General Symptoms: Fatigue  Neurologic/Neuromuscular Symptoms: Weakness (generalized)  ENT Symptoms: WDL (absence of symptoms)  Respiratory Symptoms: WDL (absence of symptoms)  Shortness of Breath: Denies  Cardiovascular Symptoms: WDL (absence of symptoms)  GI Symptoms: WDL (absence of symptoms)   Symptoms: WDL (absence of symptoms)  Skin Symptoms: WDL (absence of symptoms)  Sleeping Behaviors:WDL (absence of symptoms)  Current Lines/Drains:Yes  Line/Drain Type:Urinary catheter (Pt also has 30 day cardiac event monitor)  Line/Drain Status:WDL  Patient's reported confidence in appropriate use of line/drain:No  Description of patients lack of confidence in use of line/drain:[facility] staff handle care of catheter    Activities of Daily Living (global): Partial assistance    Patient states that he does have sufficient family support. He feels that he is able to ask for assistance when needed. Pt lives at [facility] and has adult children that assist as well.       months, have you or your family members you live with been unable to get utilities (heat, electricity) when it was really needed?: No   Food Insecurity: Low Risk  (12/26/2023)    Food Insecurity     Within the past 12 months, did you worry that your food would run out before you got money to buy more?: No     Within the past 12 months, did the food you bought just not last and you didn t have money to get more?: No   Transportation Needs: Low Risk  (12/26/2023)    Transportation Needs     Within the past 12 months, has lack of transportation kept you from medical appointments, getting your medicines, non-medical meetings or appointments, work, or from getting things that you need?: No   Physical Activity: Not on file   Stress: Not on file   Social Connections: Not on file   Interpersonal Safety: Low Risk  (12/26/2023)    Interpersonal Safety     Do you feel physically and emotionally safe where you currently live?: Yes     Within the past 12 months, have you been hit, slapped, kicked or otherwise physically hurt by someone?: No     Within the past 12 months, have you been humiliated or emotionally abused in other ways by your partner or ex-partner?: No   Housing Stability: Low Risk  (12/26/2023)    Housing Stability     Do you have housing? : Yes     Are you worried about losing your housing?: No       Outpatient Encounter Medications as of 6/26/2024   Medication Sig Dispense Refill    albuterol (PROAIR HFA/PROVENTIL HFA/VENTOLIN HFA) 108 (90 Base) MCG/ACT inhaler INHALE 2 PUFF BY MOUTH FOUR TIMES DAILY AS NEEDED FOR BREATHING      alendronate (FOSAMAX) 70 MG tablet TAKE 1 TABLET BY MOUTH WEEKLY  WITH 8 OZ OF PLAIN WATER 30  MINUTES BEFORE FIRST FOOD, DRINK OR MEDS. STAY UPRIGHT FOR 30  MINS 12 tablet 0    atorvastatin (LIPITOR) 20 MG tablet Take 1 tablet (20 mg) by mouth daily 100 tablet 3    citalopram (CELEXA) 20 MG tablet Take 1 tablet (20 mg) by mouth daily 90 tablet 3    COMPOUNDED NON-CONTROLLED SUBSTANCE  (CMPD RX) - PHARMACY TO MIX COMPOUNDED MEDICATION Apply to face BID x5-10 days, wash hands after applying. Avoid sun 30 g 1    cyclobenzaprine (FLEXERIL) 10 MG tablet Take 1 tablet (10 mg) by mouth 3 times daily as needed for muscle spasms 20 tablet 1    diclofenac (VOLTAREN) 1 % topical gel Apply topically as needed for moderate pain      guaiFENesin-codeine (ROBITUSSIN AC) 100-10 MG/5ML solution Take 5-10 mLs by mouth every 4 hours as needed for cough (may cause drowsiness) (Patient not taking: Reported on 12/26/2023) 118 mL 0    multivitamin w/minerals (MULTI-VITAMIN) tablet Take 1 tablet by mouth daily      predniSONE (DELTASONE) 20 MG tablet Take 3 tabs by mouth daily x 3 days, then 2 tabs daily x 3 days, then 1 tab daily x 3 days, then 1/2 tab daily x 3 days. (Patient not taking: Reported on 12/26/2023) 20 tablet 0    triamcinolone (KENALOG) 0.1 % external cream Apply topically 2 times daily (Patient taking differently: Apply topically 2 times daily Only using PRN) 30 g 3    Turmeric 500 MG TABS Take by mouth 2 times daily      Vitamin D, Cholecalciferol, 25 MCG (1000 UT) CAPS       zolpidem (AMBIEN) 5 MG tablet Take 1 tablet (5 mg) by mouth nightly as needed for sleep 30 tablet 0     No facility-administered encounter medications on file as of 6/26/2024.             O:   NAD, WDWN, Alert & Oriented, Mood & Affect wnl, Vitals stable   Here today alone   LMP  (LMP Unknown)    General appearance normal   Vitals stable   Alert, oriented and in no acute distress      Brown stuck papules on face   Stuck on papules and brown macules on trunk and ext   Red papules on trunk  Brown papules and macules with regular pigment network and borders on torso and extremities     The remainder of skin exam is normal       Eyes: Conjunctivae/lids:Normal     ENT: Lips: normal    MSK:Normal    Cardiovascular: peripheral edema none    Pulm: Breathing Normal    Neuro/Psych: Orientation:Alert and Orientedx3 ; Mood/Affect:normal    A/P:  1. Inflamed seborrheic keratosis on forehead, right temple and right shoulder x 3   LN2:  Treated with LN2 for 5s for 1-2 cycles. Warned risks of blistering, pain, pigment change, scarring, and incomplete resolution.  Advised patient to return if lesions do not completely resolve.  Wound care sheet given.  2.Seborrheic keratosis, lentigo, angioma, benign nevi, history of SCC  BENIGN LESIONS DISCUSSED WITH PATIENT:  I discussed the specifics of tumor, prognosis, and genetics of benign lesions.  I explained that treatment of these lesions would be purely cosmetic and not medically neccessary.  I discussed with patient different removal options including excision, cautery and /or laser.      Nature and genetics of benign skin lesions dicussed with patient.  Signs and Symptoms of skin cancer discussed with patient.  ABCDEs of melanoma reviewed with patient.  Patient encouraged to perform monthly skin exams.  UV precautions reviewed with patient.  Risks of non-melanoma skin cancer discussed with patient   Return to clinic in one year or sooner if needed.

## 2024-08-13 ENCOUNTER — OFFICE VISIT (OUTPATIENT)
Dept: ENDOCRINOLOGY | Facility: CLINIC | Age: 74
End: 2024-08-13
Attending: FAMILY MEDICINE
Payer: COMMERCIAL

## 2024-08-13 VITALS
OXYGEN SATURATION: 97 % | SYSTOLIC BLOOD PRESSURE: 110 MMHG | DIASTOLIC BLOOD PRESSURE: 72 MMHG | BODY MASS INDEX: 21.46 KG/M2 | HEART RATE: 68 BPM | WEIGHT: 125 LBS

## 2024-08-13 DIAGNOSIS — M85.88 OSTEOPENIA OF LUMBAR SPINE: ICD-10-CM

## 2024-08-13 DIAGNOSIS — M81.0 AGE-RELATED OSTEOPOROSIS WITHOUT CURRENT PATHOLOGICAL FRACTURE: Primary | ICD-10-CM

## 2024-08-13 LAB
ALBUMIN SERPL BCG-MCNC: 4.1 G/DL (ref 3.5–5.2)
BUN SERPL-MCNC: 11.5 MG/DL (ref 8–23)
CALCIUM SERPL-MCNC: 9 MG/DL (ref 8.8–10.4)
CREAT SERPL-MCNC: 0.58 MG/DL (ref 0.51–0.95)
EGFRCR SERPLBLD CKD-EPI 2021: >90 ML/MIN/1.73M2
PHOSPHATE SERPL-MCNC: 4.1 MG/DL (ref 2.5–4.5)

## 2024-08-13 PROCEDURE — 84443 ASSAY THYROID STIM HORMONE: CPT | Mod: GZ | Performed by: INTERNAL MEDICINE

## 2024-08-13 PROCEDURE — 84520 ASSAY OF UREA NITROGEN: CPT | Performed by: INTERNAL MEDICINE

## 2024-08-13 PROCEDURE — 82040 ASSAY OF SERUM ALBUMIN: CPT | Performed by: INTERNAL MEDICINE

## 2024-08-13 PROCEDURE — 83970 ASSAY OF PARATHORMONE: CPT | Performed by: INTERNAL MEDICINE

## 2024-08-13 PROCEDURE — 82565 ASSAY OF CREATININE: CPT | Performed by: INTERNAL MEDICINE

## 2024-08-13 PROCEDURE — 86364 TISS TRNSGLTMNASE EA IG CLAS: CPT | Performed by: INTERNAL MEDICINE

## 2024-08-13 PROCEDURE — 82310 ASSAY OF CALCIUM: CPT | Performed by: INTERNAL MEDICINE

## 2024-08-13 PROCEDURE — 82306 VITAMIN D 25 HYDROXY: CPT | Performed by: INTERNAL MEDICINE

## 2024-08-13 PROCEDURE — 84100 ASSAY OF PHOSPHORUS: CPT | Performed by: INTERNAL MEDICINE

## 2024-08-13 PROCEDURE — 36415 COLL VENOUS BLD VENIPUNCTURE: CPT | Performed by: INTERNAL MEDICINE

## 2024-08-13 PROCEDURE — 99204 OFFICE O/P NEW MOD 45 MIN: CPT | Performed by: INTERNAL MEDICINE

## 2024-08-13 RX ORDER — BUDESONIDE 3 MG/1
6 CAPSULE, COATED PELLETS ORAL EVERY MORNING
COMMUNITY
End: 2024-09-30

## 2024-08-13 NOTE — PROGRESS NOTES
Endocrinology Clinic Visit    Chief Complaint: New Patient and Endocrine Problem (Osteopenia )     Information obtained from:Patient      Assessment/Treatment Plan:    Osteoporosis    Previous treatment with Fosamax 2018-January 2024  Currently on treatment holiday  I have personally reviewed DEXA scan from 2/23/2023 and agree with interpretation of lumbar spine T-score L1-L4 -2, left and right femoral neck T-scores -1.4 and -1.7 respectively and mean total hip T-score -1.5.  Weight bearing Exercises  Fall prevention  Adequate Calcium and vitamin D intake: for maintenance calcium 1200 mg daily  from all sources and vitamin D 1000 IU daily.    Follow-up DEXA scan and will reassess bone specific therapy needed.  Work up for secondary causes  Test and/or medications prescribed today:  Orders Placed This Encounter   Procedures    DX Bone Density    Albumin level    Calcium    Parathyroid Hormone Intact    Phosphorus    Vitamin D Deficiency (D3 Only)    Creatinine    Urea nitrogen    Tissue transglutaminase hetal IgA and IgG    TSH with free T4 reflex    Calcium timed urine    Creatinine timed urine         Graham Flores MD  Staff Endocrinologist    Division of Endocrinology and Diabetes      Subjective:         HPI: Sara Keita is a 73 year old female with history of osteoporosis who [facility].    Never had regular menses but was started on hormone replacement therapy at the age of 40 and has been on HRT for 15 years until 2007.  Diagnosed with osteopenia in 2012 and was noted to continue to have osteopenia between 2402-3506.  Osteoporosis diagnosis was made in 2018 and at that time was started on alendronate treatment and has taken alendronate between 2018-January 2024.  She was noted to have improvement in bone density in 2021 and again in 2023.  Past medical history of microscopic colitis for which she has been on budesonide > 3 months  "oral.  Previous fracture after the age of 50- never   Age >65-yes  Loss of height = 1.5 inches   Low body mass index; weight less than 127 pounds; 125 pounds (80's 130-140)  Family/Parental history of hip fracture/Osteoporosis - no  Smoking - college -quitted 1973,  alcohol quitted 1987  Fracture hx:  Chronic glucocorticoid use: Budesonide as documented above.  Previous osteoporosis treatment(s): Fosamax for about 6 years  Hx of hypercalcemia: no   Hx of nephrolithiasis: no   No history of hypercalcemia or nephrolithiasis.  No history of bone cancer in the family.  No history of external beam radiation therapy.  No history of MI or CVA.  Contraindications to osteoporosis treatment options:     No upcoming invasive dental work.  Secondary causes of osteoporosis:     Calcium (yougurt, calcium, cheese) and vitamin D intake- 1000 international unit(s) daily   Exercise - yoga, walking, run 3 miles,   \"     Lumbar spine T-score in region of L1-L4 = -2      HIPS:  Mean total hip T-score: -1.5     Left femoral neck T-score = -1.4  Right femoral neck T-score= -1.7      Radius 33% T-score = NA\"      No Known Allergies    Current Outpatient Medications   Medication Sig Dispense Refill    atorvastatin (LIPITOR) 20 MG tablet Take 1 tablet (20 mg) by mouth daily 100 tablet 3    budesonide (ENTOCORT EC) 3 MG EC capsule Take 6 mg by mouth every morning      citalopram (CELEXA) 20 MG tablet Take 1 tablet (20 mg) by mouth daily 90 tablet 3    diclofenac (VOLTAREN) 1 % topical gel Apply topically as needed for moderate pain      triamcinolone (KENALOG) 0.1 % external cream Apply topically 2 times daily 30 g 3    Turmeric 500 MG TABS Take by mouth 2 times daily      Vitamin D, Cholecalciferol, 25 MCG (1000 UT) CAPS       albuterol (PROAIR HFA/PROVENTIL HFA/VENTOLIN HFA) 108 (90 Base) MCG/ACT inhaler INHALE 2 PUFF BY MOUTH FOUR TIMES DAILY AS NEEDED FOR BREATHING (Patient not taking: Reported on 8/13/2024)      alendronate (FOSAMAX) 70 " MG tablet TAKE 1 TABLET BY MOUTH WEEKLY  WITH 8 OZ OF PLAIN WATER 30  MINUTES BEFORE FIRST FOOD, DRINK OR MEDS. STAY UPRIGHT FOR 30  MINS (Patient not taking: Reported on 8/13/2024) 12 tablet 0    COMPOUNDED NON-CONTROLLED SUBSTANCE (CMPD RX) - PHARMACY TO MIX COMPOUNDED MEDICATION Apply to face BID x5-10 days, wash hands after applying. Avoid sun (Patient not taking: Reported on 8/13/2024) 30 g 1    cyclobenzaprine (FLEXERIL) 10 MG tablet Take 1 tablet (10 mg) by mouth 3 times daily as needed for muscle spasms (Patient not taking: Reported on 8/13/2024) 20 tablet 1    guaiFENesin-codeine (ROBITUSSIN AC) 100-10 MG/5ML solution Take 5-10 mLs by mouth every 4 hours as needed for cough (may cause drowsiness) (Patient not taking: Reported on 12/26/2023) 118 mL 0    multivitamin w/minerals (MULTI-VITAMIN) tablet Take 1 tablet by mouth daily (Patient not taking: Reported on 8/13/2024)      predniSONE (DELTASONE) 20 MG tablet Take 3 tabs by mouth daily x 3 days, then 2 tabs daily x 3 days, then 1 tab daily x 3 days, then 1/2 tab daily x 3 days. (Patient not taking: Reported on 12/26/2023) 20 tablet 0    zolpidem (AMBIEN) 5 MG tablet Take 1 tablet (5 mg) by mouth nightly as needed for sleep (Patient not taking: Reported on 8/13/2024) 30 tablet 0       Review of Systems     8 point review system (Constitutional, HENT, Eyes, Respiratory, Cardiovascular, Gastrointestinal, Genitourinary, Musculoskeletal,Neurological, Psychiatric/Behavioural, Endocrine) is negative or is as per HPI above    Past Medical History:   Diagnosis Date    Hand arthritis 4/4/2024    Squamous cell carcinoma of skin, unspecified        Past Surgical History:   Procedure Laterality Date    COLONOSCOPY  2016    Collagenous colitis    ENT SURGERY  20 yr ago    For migraines       Family History   Problem Relation Age of Onset    Diabetes Father         Adult onset at age 86    Glaucoma No family hx of     Macular Degeneration No family hx of        Social  History     Socioeconomic History    Marital status: Single   Tobacco Use    Smoking status: Former     Current packs/day: 0.00     Average packs/day: 1 pack/day for 7.0 years (7.0 ttl pk-yrs)     Types: Cigarettes     Start date: 1966     Quit date: 1973     Years since quittin.6    Smokeless tobacco: Never    Tobacco comments:     I smoked from  to    Vaping Use    Vaping status: Never Used   Substance and Sexual Activity    Alcohol use: Not Currently    Drug use: Never    Sexual activity: Not Currently     Partners: Male     Birth control/protection: None   Other Topics Concern    Parent/sibling w/ CABG, MI or angioplasty before 65F 55M? No     Social Determinants of Health     Financial Resource Strain: Low Risk  (2023)    Financial Resource Strain     Within the past 12 months, have you or your family members you live with been unable to get utilities (heat, electricity) when it was really needed?: No   Food Insecurity: Low Risk  (2023)    Food Insecurity     Within the past 12 months, did you worry that your food would run out before you got money to buy more?: No     Within the past 12 months, did the food you bought just not last and you didn t have money to get more?: No   Transportation Needs: Low Risk  (2023)    Transportation Needs     Within the past 12 months, has lack of transportation kept you from medical appointments, getting your medicines, non-medical meetings or appointments, work, or from getting things that you need?: No   Interpersonal Safety: Low Risk  (2023)    Interpersonal Safety     Do you feel physically and emotionally safe where you currently live?: Yes     Within the past 12 months, have you been hit, slapped, kicked or otherwise physically hurt by someone?: No     Within the past 12 months, have you been humiliated or emotionally abused in other ways by your partner or ex-partner?: No   Housing Stability: Low Risk  (2023)    Housing  Stability     Do you have housing? : Yes     Are you worried about losing your housing?: No       Objective:   /72 (BP Location: Left arm, Patient Position: Sitting, Cuff Size: Adult Regular)   Pulse 68   Wt 56.7 kg (125 lb)   LMP  (LMP Unknown)   SpO2 97%   BMI 21.46 kg/m    Constitutional: Pleasant no acute cardiopulmonary distress.   EYES: anicteric, normal extra-ocular movements, no lid lag or retraction, is equal and reactive to light bilaterally.  HEENT: Mouth/Throat: Mucous membrane is moist. Oropharynx is clear.     Cardiovascular: RRR, S1, S2 normal.   Pulmonary/Chest: CTAB. No wheezing or rales.   Abdominal: +BS. Non tender to palpation.  Stretch marks: None  Neurological: Alert and oriented.  No tremor and reflexes are symmetrical bilaterally and within the normal limits. Muscle strength 5/5.   Extremities: No edema.  Spine: No point tenderness   Psychological: appropriate mood and affect    In House Labs:     Creatinine   Date Value Ref Range Status   08/13/2024 0.58 0.51 - 0.95 mg/dL Final   ]  Component      Latest Ref Rng 8/13/2024  3:40 PM   Creatinine      0.51 - 0.95 mg/dL 0.58    GFR Estimate      >60 mL/min/1.73m2 >90    Albumin      3.5 - 5.2 g/dL 4.1    Calcium      8.8 - 10.4 mg/dL 9.0    Phosphorus      2.5 - 4.5 mg/dL 4.1    Urea Nitrogen      8.0 - 23.0 mg/dL 11.5        This note has been dictated using voice recognition software.  As a result, there may be errors in the documentation that have gone undetected.  Please consider this when interpreting information in this documentation.    55 minutes spent by me on the date of the encounter doing chart review, history and exam, counseling on management of osteoporosis, documentation and further activities per the note.

## 2024-08-13 NOTE — PATIENT INSTRUCTIONS
Weight bearing Exercises  Fall prevention  Adequate Calcium and vitamin D intake: for maintenance calcium 1200 mg daily  from all sources and vitamin D 1000 IU daily.    Work up for secondary causes  Orders Placed This Encounter   Procedures    Albumin level    Calcium    Parathyroid Hormone Intact    Phosphorus    Vitamin D Deficiency (D3 Only)    Creatinine    Urea nitrogen    Tissue transglutaminase hetal IgA and IgG    TSH with free T4 reflex    Calcium timed urine    Creatinine timed urine    24 hour urine collection to check calcium.   a day; if Sunday discard the first urine on Sunday morning and collect all other urine all day Sunday and night; including morning urine of Monday morning. Bring it back to the laboratory Monday morning after completion of urine collection.           [facility] Address:   [place] Address:     [address] [place] [id number]    Phone: [phone]  Fax: [phone] [address] [place] [address]    Phone: [phone]  Fax: [phone]     [facility] Cost Estimate Phone Number: [phone]    General Lab and Imaging Scheduling Phone Number: [phone]

## 2024-08-13 NOTE — NURSING NOTE
Sara Keita's goals for this visit include:   Chief Complaint   Patient presents with    New Patient    Endocrine Problem     Osteopenia      She requests these members of her care team be copied on today's visit information: Yes     PCP: Jasmin Mcintyre    Referring Provider:  Jasmin Mcintyre MD  [id number] [place] [address]    /72 (BP Location: Left arm, Patient Position: Sitting, Cuff Size: Adult Regular)   Pulse 68   Wt 56.7 kg (125 lb)   LMP  (LMP Unknown)   SpO2 97%   BMI 21.46 kg/m      Do you need any medication refills at today's visit? Unsure       Lexy Lewis CMA  Adult Endocrinology   [facility]

## 2024-08-13 NOTE — LETTER
8/13/2024      Sara Keita  [address] [facility] [address]      Dear Colleague,    Thank you for referring your patient, Sara Keita, to the [facility]. Please see a copy of my visit note below.    Endocrinology Clinic Visit    Chief Complaint: New Patient and Endocrine Problem (Osteopenia )     Information obtained from:Patient      Assessment/Treatment Plan:    Osteoporosis    Previous treatment with Fosamax 2018-January 2024  Currently on treatment holiday  I have personally reviewed DEXA scan from 2/23/2023 and agree with interpretation of lumbar spine T-score L1-L4 -2, left and right femoral neck T-scores -1.4 and -1.7 respectively and mean total hip T-score -1.5.  Weight bearing Exercises  Fall prevention  Adequate Calcium and vitamin D intake: for maintenance calcium 1200 mg daily  from all sources and vitamin D 1000 IU daily.    Follow-up DEXA scan and will reassess bone specific therapy needed.  Work up for secondary causes  Test and/or medications prescribed today:  Orders Placed This Encounter   Procedures     DX Bone Density     Albumin level     Calcium     Parathyroid Hormone Intact     Phosphorus     Vitamin D Deficiency (D3 Only)     Creatinine     Urea nitrogen     Tissue transglutaminase hetal IgA and IgG     TSH with free T4 reflex     Calcium timed urine     Creatinine timed urine         Graham Flores MD  Staff Endocrinologist    Division of Endocrinology and Diabetes      Subjective:         HPI: Sara Keita is a 73 year old female with history of osteoporosis who is seen in consultation at Jasmin LuisEdward's request for the same.    Never had regular menses but was started on hormone replacement therapy at the age of 40 and has been on HRT for 15 years until 2007.  Diagnosed with osteopenia in 2012 and was noted to continue to have osteopenia between [id number].  Osteoporosis diagnosis was made in 2018 and at that time was started on  "alendronate treatment and has taken alendronate between 2018-January 2024.  She was noted to have improvement in bone density in 2021 and again in 2023.  Past medical history of microscopic colitis for which she has been on budesonide > 3 months oral.  Previous fracture after the age of 50- never   Age >65-yes  Loss of height = 1.5 inches   Low body mass index; weight less than 127 pounds; 125 pounds (80's 130-140)  Family/Parental history of hip fracture/Osteoporosis - no  Smoking - college -quitted 1973,  alcohol quitted 1987  Fracture hx:  Chronic glucocorticoid use: Budesonide as documented above.  Previous osteoporosis treatment(s): Fosamax for about 6 years  Hx of hypercalcemia: no   Hx of nephrolithiasis: no   No history of hypercalcemia or nephrolithiasis.  No history of bone cancer in the family.  No history of external beam radiation therapy.  No history of MI or CVA.  Contraindications to osteoporosis treatment options:     No upcoming invasive dental work.  Secondary causes of osteoporosis:     Calcium (yougurt, calcium, cheese) and vitamin D intake- 1000 international unit(s) daily   Exercise - yoga, walking, run 3 miles,   \"     Lumbar spine T-score in region of L1-L4 = -2      HIPS:  Mean total hip T-score: -1.5     Left femoral neck T-score = -1.4  Right femoral neck T-score= -1.7      Radius 33% T-score = NA\"      No Known Allergies    Current Outpatient Medications   Medication Sig Dispense Refill     atorvastatin (LIPITOR) 20 MG tablet Take 1 tablet (20 mg) by mouth daily 100 tablet 3     budesonide (ENTOCORT EC) 3 MG EC capsule Take 6 mg by mouth every morning       citalopram (CELEXA) 20 MG tablet Take 1 tablet (20 mg) by mouth daily 90 tablet 3     diclofenac (VOLTAREN) 1 % topical gel Apply topically as needed for moderate pain       triamcinolone (KENALOG) 0.1 % external cream Apply topically 2 times daily 30 g 3     Turmeric 500 MG TABS Take by mouth 2 times daily       Vitamin D, " Cholecalciferol, 25 MCG (1000 UT) CAPS        albuterol (PROAIR HFA/PROVENTIL HFA/VENTOLIN HFA) 108 (90 Base) MCG/ACT inhaler INHALE 2 PUFF BY MOUTH FOUR TIMES DAILY AS NEEDED FOR BREATHING (Patient not taking: Reported on 8/13/2024)       alendronate (FOSAMAX) 70 MG tablet TAKE 1 TABLET BY MOUTH WEEKLY  WITH 8 OZ OF PLAIN WATER 30  MINUTES BEFORE FIRST FOOD, DRINK OR MEDS. STAY UPRIGHT FOR 30  MINS (Patient not taking: Reported on 8/13/2024) 12 tablet 0     COMPOUNDED NON-CONTROLLED SUBSTANCE (CMPD RX) - PHARMACY TO MIX COMPOUNDED MEDICATION Apply to face BID x5-10 days, wash hands after applying. Avoid sun (Patient not taking: Reported on 8/13/2024) 30 g 1     cyclobenzaprine (FLEXERIL) 10 MG tablet Take 1 tablet (10 mg) by mouth 3 times daily as needed for muscle spasms (Patient not taking: Reported on 8/13/2024) 20 tablet 1     guaiFENesin-codeine (ROBITUSSIN AC) 100-10 MG/5ML solution Take 5-10 mLs by mouth every 4 hours as needed for cough (may cause drowsiness) (Patient not taking: Reported on 12/26/2023) 118 mL 0     multivitamin w/minerals (MULTI-VITAMIN) tablet Take 1 tablet by mouth daily (Patient not taking: Reported on 8/13/2024)       predniSONE (DELTASONE) 20 MG tablet Take 3 tabs by mouth daily x 3 days, then 2 tabs daily x 3 days, then 1 tab daily x 3 days, then 1/2 tab daily x 3 days. (Patient not taking: Reported on 12/26/2023) 20 tablet 0     zolpidem (AMBIEN) 5 MG tablet Take 1 tablet (5 mg) by mouth nightly as needed for sleep (Patient not taking: Reported on 8/13/2024) 30 tablet 0       Review of Systems     8 point review system (Constitutional, HENT, Eyes, Respiratory, Cardiovascular, Gastrointestinal, Genitourinary, Musculoskeletal,Neurological, Psychiatric/Behavioural, Endocrine) is negative or is as per HPI above    Past Medical History:   Diagnosis Date     Hand arthritis 4/4/2024     Squamous cell carcinoma of skin, unspecified        Past Surgical History:   Procedure Laterality Date      COLONOSCOPY  2016    Collagenous colitis     ENT SURGERY  20 yr ago    For migraines       Family History   Problem Relation Age of Onset     Diabetes Father         Adult onset at age 86     Glaucoma No family hx of      Macular Degeneration No family hx of        Social History     Socioeconomic History     Marital status: Single   Tobacco Use     Smoking status: Former     Current packs/day: 0.00     Average packs/day: 1 pack/day for 7.0 years (7.0 ttl pk-yrs)     Types: Cigarettes     Start date: 1966     Quit date: 1973     Years since quittin.6     Smokeless tobacco: Never     Tobacco comments:     I smoked from  to    Vaping Use     Vaping status: Never Used   Substance and Sexual Activity     Alcohol use: Not Currently     Drug use: Never     Sexual activity: Not Currently     Partners: Male     Birth control/protection: None   Other Topics Concern     Parent/sibling w/ CABG, MI or angioplasty before 65F 55M? No     Social Determinants of Health     Financial Resource Strain: Low Risk  (2023)    Financial Resource Strain      Within the past 12 months, have you or your family members you live with been unable to get utilities (heat, electricity) when it was really needed?: No   Food Insecurity: Low Risk  (2023)    Food Insecurity      Within the past 12 months, did you worry that your food would run out before you got money to buy more?: No      Within the past 12 months, did the food you bought just not last and you didn t have money to get more?: No   Transportation Needs: Low Risk  (2023)    Transportation Needs      Within the past 12 months, has lack of transportation kept you from medical appointments, getting your medicines, non-medical meetings or appointments, work, or from getting things that you need?: No   Interpersonal Safety: Low Risk  (2023)    Interpersonal Safety      Do you feel physically and emotionally safe where you currently live?: Yes       Within the past 12 months, have you been hit, slapped, kicked or otherwise physically hurt by someone?: No      Within the past 12 months, have you been humiliated or emotionally abused in other ways by your partner or ex-partner?: No   Housing Stability: Low Risk  (12/26/2023)    Housing Stability      Do you have housing? : Yes      Are you worried about losing your housing?: No       Objective:   /72 (BP Location: Left arm, Patient Position: Sitting, Cuff Size: Adult Regular)   Pulse 68   Wt 56.7 kg (125 lb)   LMP  (LMP Unknown)   SpO2 97%   BMI 21.46 kg/m    Constitutional: Pleasant no acute cardiopulmonary distress.   EYES: anicteric, normal extra-ocular movements, no lid lag or retraction, is equal and reactive to light bilaterally.  HEENT: Mouth/Throat: Mucous membrane is moist. Oropharynx is clear.     Cardiovascular: RRR, S1, S2 normal.   Pulmonary/Chest: CTAB. No wheezing or rales.   Abdominal: +BS. Non tender to palpation.  Stretch marks: None  Neurological: Alert and oriented.  No tremor and reflexes are symmetrical bilaterally and within the normal limits. Muscle strength 5/5.   Extremities: No edema.  Spine: No point tenderness   Psychological: appropriate mood and affect    In House Labs:     Creatinine   Date Value Ref Range Status   08/13/2024 0.58 0.51 - 0.95 mg/dL Final   ]  Component      Latest Ref Rng 8/13/2024  3:40 PM   Creatinine      0.51 - 0.95 mg/dL 0.58    GFR Estimate      >60 mL/min/1.73m2 >90    Albumin      3.5 - 5.2 g/dL 4.1    Calcium      8.8 - 10.4 mg/dL 9.0    Phosphorus      2.5 - 4.5 mg/dL 4.1    Urea Nitrogen      8.0 - 23.0 mg/dL 11.5        This note has been dictated using voice recognition software.  As a result, there may be errors in the documentation that have gone undetected.  Please consider this when interpreting information in this documentation.    55 minutes spent by me on the date of the encounter doing chart review, history and exam, counseling  on management of osteoporosis, documentation and further activities per the note.       Again, thank you for allowing me to participate in the care of your patient.        Sincerely,        Graham Flores MD

## 2024-08-14 LAB
PTH-INTACT SERPL-MCNC: 18 PG/ML (ref 15–65)
TSH SERPL DL<=0.005 MIU/L-ACNC: 2.72 UIU/ML (ref 0.3–4.2)
TTG IGA SER-ACNC: 0.2 U/ML
TTG IGG SER-ACNC: <0.6 U/ML
VIT D+METAB SERPL-MCNC: 37 NG/ML (ref 20–50)

## 2024-08-16 LAB
CALCIUM 24H UR-MRATE: 0.25 G/SPEC (ref 0.1–0.3)
CALCIUM UR-MCNC: 12.6 MG/DL
COLLECT DURATION TIME UR: 24 H
COLLECT DURATION TIME UR: 24 H
CREAT 24H UR-MRATE: 0.81 G/SPEC (ref 0.72–1.51)
CREAT UR-MCNC: 41.6 MG/DL
SPECIMEN VOL UR: 1950 ML
SPECIMEN VOL UR: 1950 ML

## 2024-08-16 PROCEDURE — 82570 ASSAY OF URINE CREATININE: CPT | Performed by: INTERNAL MEDICINE

## 2024-08-16 PROCEDURE — 81050 URINALYSIS VOLUME MEASURE: CPT | Performed by: INTERNAL MEDICINE

## 2024-08-16 PROCEDURE — 82340 ASSAY OF CALCIUM IN URINE: CPT | Performed by: INTERNAL MEDICINE

## 2024-09-21 ENCOUNTER — MYC REFILL (OUTPATIENT)
Dept: FAMILY MEDICINE | Facility: CLINIC | Age: 74
End: 2024-09-21
Payer: COMMERCIAL

## 2024-09-21 RX ORDER — BUDESONIDE 3 MG/1
6 CAPSULE, COATED PELLETS ORAL EVERY MORNING
OUTPATIENT
Start: 2024-09-21

## 2024-09-30 DIAGNOSIS — K52.831 COLLAGENOUS COLITIS: Primary | ICD-10-CM

## 2024-09-30 RX ORDER — BUDESONIDE 3 MG/1
6 CAPSULE, COATED PELLETS ORAL EVERY MORNING
Qty: 60 CAPSULE | Refills: 1 | Status: SHIPPED | OUTPATIENT
Start: 2024-09-30

## 2024-11-04 ENCOUNTER — TRANSFERRED RECORDS (OUTPATIENT)
Dept: HEALTH INFORMATION MANAGEMENT | Facility: CLINIC | Age: 74
End: 2024-11-04
Payer: COMMERCIAL

## 2024-11-25 ENCOUNTER — E-VISIT (OUTPATIENT)
Dept: FAMILY MEDICINE | Facility: CLINIC | Age: 74
End: 2024-11-25
Payer: COMMERCIAL

## 2024-11-25 ENCOUNTER — MYC MEDICAL ADVICE (OUTPATIENT)
Dept: FAMILY MEDICINE | Facility: CLINIC | Age: 74
End: 2024-11-25

## 2024-11-25 DIAGNOSIS — G47.9 SLEEP TROUBLE: Primary | ICD-10-CM

## 2024-11-25 DIAGNOSIS — G47.00 INSOMNIA, UNSPECIFIED TYPE: ICD-10-CM

## 2024-11-25 RX ORDER — ZOLPIDEM TARTRATE 5 MG/1
5 TABLET ORAL
Qty: 30 TABLET | Refills: 0 | Status: SHIPPED | OUTPATIENT
Start: 2024-11-25

## 2024-12-04 ENCOUNTER — TRANSFERRED RECORDS (OUTPATIENT)
Dept: HEALTH INFORMATION MANAGEMENT | Facility: CLINIC | Age: 74
End: 2024-12-04
Payer: COMMERCIAL

## 2025-01-02 ENCOUNTER — MYC MEDICAL ADVICE (OUTPATIENT)
Dept: FAMILY MEDICINE | Facility: CLINIC | Age: 75
End: 2025-01-02
Payer: COMMERCIAL

## 2025-01-03 NOTE — TELEPHONE ENCOUNTER
Next 5 appointments (look out 90 days)      Jan 07, 2025 10:00 AM  (Arrive by 9:40 AM)  Provider Visit with Jasmin Mcintyre MD  [facility] ([facility] ) [address] [facility] [address]  [phone]     Mar 06, 2025 11:00 AM  (Arrive by 10:40 AM)  Annual Wellness Visit with Jasmin Mcintyre MD  [facility] ([facility] ) [address] [facility] [address]  [phone]

## 2025-01-06 NOTE — TELEPHONE ENCOUNTER
Have her see another provider  My schedule is full for the week and I do not have the capacity to add her on this week.     Jasmin Mcintyre MD

## 2025-01-06 NOTE — TELEPHONE ENCOUNTER
Spoke with patient who verified she will keep her appointment on 1/7/25 with PCP.  Kimberley EPPS    [facility]

## 2025-01-07 ENCOUNTER — OFFICE VISIT (OUTPATIENT)
Dept: FAMILY MEDICINE | Facility: CLINIC | Age: 75
End: 2025-01-07
Payer: COMMERCIAL

## 2025-01-07 VITALS
BODY MASS INDEX: 21.51 KG/M2 | DIASTOLIC BLOOD PRESSURE: 70 MMHG | HEART RATE: 74 BPM | HEIGHT: 64 IN | TEMPERATURE: 97.7 F | WEIGHT: 126 LBS | RESPIRATION RATE: 17 BRPM | OXYGEN SATURATION: 98 % | SYSTOLIC BLOOD PRESSURE: 104 MMHG

## 2025-01-07 DIAGNOSIS — Z71.84 TRAVEL ADVICE ENCOUNTER: ICD-10-CM

## 2025-01-07 DIAGNOSIS — H81.11 BENIGN PAROXYSMAL POSITIONAL VERTIGO OF RIGHT EAR: Primary | ICD-10-CM

## 2025-01-07 PROCEDURE — G2211 COMPLEX E/M VISIT ADD ON: HCPCS | Performed by: FAMILY MEDICINE

## 2025-01-07 PROCEDURE — 99213 OFFICE O/P EST LOW 20 MIN: CPT | Performed by: FAMILY MEDICINE

## 2025-01-07 RX ORDER — MECLIZINE HYDROCHLORIDE 25 MG/1
25 TABLET ORAL 3 TIMES DAILY PRN
Qty: 20 TABLET | Refills: 0 | Status: SHIPPED | OUTPATIENT
Start: 2025-01-07

## 2025-01-07 ASSESSMENT — PAIN SCALES - GENERAL: PAINLEVEL_OUTOF10: NO PAIN (0)

## 2025-01-07 ASSESSMENT — PATIENT HEALTH QUESTIONNAIRE - PHQ9
10. IF YOU CHECKED OFF ANY PROBLEMS, HOW DIFFICULT HAVE THESE PROBLEMS MADE IT FOR YOU TO DO YOUR WORK, TAKE CARE OF THINGS AT HOME, OR GET ALONG WITH OTHER PEOPLE: NOT DIFFICULT AT ALL
SUM OF ALL RESPONSES TO PHQ QUESTIONS 1-9: 1
SUM OF ALL RESPONSES TO PHQ QUESTIONS 1-9: 1

## 2025-01-07 ASSESSMENT — ENCOUNTER SYMPTOMS: DIZZINESS: 1

## 2025-01-07 NOTE — PROGRESS NOTES
Assessment & Plan     Benign paroxysmal positional vertigo of right ear  Trial of meclizine, continue Epley, follow-up if not improving  - meclizine (ANTIVERT) 25 MG tablet; Take 1 tablet (25 mg) by mouth 3 times daily as needed for dizziness.    Travel advice encounter  She will hold off on malaria prophylaxis due to low risk      The longitudinal plan of care for the diagnosis(es)/condition(s) as documented were addressed during this visit. Due to the added complexity in care, I will continue to support Amy in the subsequent management and with ongoing continuity of care.            Subjective   Amy is a 74 year old, presenting for the following health issues:  Dizziness (Started 12/29 full room spin (middle of the night) happened a few more times since then /Tried eply maneuver by self and then with a friend ( that time it helped)), Derm Problem, and Travel Clinic ([facility] and [place]  )      1/7/2025     9:40 AM   Additional Questions   Accompanied by yariel     Dizziness    History of Present Illness       Reason for visit:  Dizziness and a spot on my nose  Symptom onset:  3-7 days ago  Symptoms include:  Dizzy.  Once a unintentionally sank to the floor and a second time i sat down on floor quickly with help frpm counter.  Other times i just stop and lean on something  Symptom intensity:  Severe  Symptom progression:  Improving  Had these symptoms before:  Yes  Has tried/received treatment for these symptoms:  Yes  Previous treatment was successful:  Yes  Prior treatment description:  Epley maneuver by a medical person  What makes it worse:  Turning my head quickly, bending over, looking up  What makes it better:  Normal activities   She is taking medications regularly.     Pt traveling into area with risk of malaria  Risk is low but still present  We discussed prophylaxis.  She does not want to be on abx for an extended course.   She also has GI issues which would not tolerate abx  Discussed clothing  "and mosquito spray to minimize risk       Review of Systems  Constitutional, HEENT, cardiovascular, pulmonary, gi and gu systems are negative, except as otherwise noted.      Objective    /70   Pulse 74   Temp 97.7  F (36.5  C) (Oral)   Resp 17   Ht 1.632 m (5' 4.25\")   Wt 57.2 kg (126 lb)   LMP  (LMP Unknown)   SpO2 98%   BMI 21.46 kg/m    Body mass index is 21.46 kg/m .  Physical Exam   GENERAL: alert and no distress  HENT: ear canals and TM's normal, nose and mouth without ulcers or lesions  RESP: lungs clear to auscultation - no rales, rhonchi or wheezes  CV: regular rate and rhythm, normal S1 S2, no S3 or S4, no murmur, click or rub, no peripheral edema   NEURO: Normal strength and tone, mentation intact, and [place]  on right    No results found for this or any previous visit (from the past 24 hours).        Signed Electronically by: Jasmin Mcintyre MD    "

## 2025-01-09 ENCOUNTER — OFFICE VISIT (OUTPATIENT)
Dept: DERMATOLOGY | Facility: CLINIC | Age: 75
End: 2025-01-09
Payer: COMMERCIAL

## 2025-01-09 DIAGNOSIS — L57.0 AK (ACTINIC KERATOSIS): Primary | ICD-10-CM

## 2025-01-09 DIAGNOSIS — Z85.89 HISTORY OF SQUAMOUS CELL CARCINOMA: ICD-10-CM

## 2025-01-09 ASSESSMENT — PAIN SCALES - GENERAL: PAINLEVEL_OUTOF10: NO PAIN (0)

## 2025-01-09 NOTE — NURSING NOTE
Chief Complaint   Patient presents with    Derm Problem     2 spots on nose, has had for 3 weeks, no pain or bleeding, red and scaly        There were no vitals filed for this visit.  Wt Readings from Last 1 Encounters:   01/07/25 57.2 kg (126 lb)       Camila Carpio LPN .................1/9/2025

## 2025-01-09 NOTE — PROGRESS NOTES
Sara Keita is a pleasant 74 year old year old female patient here today for spots on nose. Present for a few weeks. No pain or bleeding. She notes rough. Feels like previous precancerous areas. Patient has no other skin complaints today.  Remainder of the HPI, Meds, PMH, Allergies, FH, and SH was reviewed in chart.    Pertinent Hx:   History of SCC  Past Medical History:   Diagnosis Date    Hand arthritis 2024    Squamous cell carcinoma of skin, unspecified        Past Surgical History:   Procedure Laterality Date    COLONOSCOPY  2016    Collagenous colitis    ENT SURGERY  20 yr ago    For migraines        Family History   Problem Relation Age of Onset    Diabetes Father         Adult onset at age 86    Glaucoma No family hx of     Macular Degeneration No family hx of        Social History     Socioeconomic History    Marital status: Single     Spouse name: Not on file    Number of children: Not on file    Years of education: Not on file    Highest education level: Not on file   Occupational History    Not on file   Tobacco Use    Smoking status: Former     Current packs/day: 0.00     Average packs/day: 1 pack/day for 7.0 years (7.0 ttl pk-yrs)     Types: Cigarettes     Start date: 1966     Quit date: 1973     Years since quittin.0    Smokeless tobacco: Never    Tobacco comments:     I smoked from  to    Vaping Use    Vaping status: Never Used   Substance and Sexual Activity    Alcohol use: Not Currently    Drug use: Never    Sexual activity: Not Currently     Partners: Male     Birth control/protection: None   Other Topics Concern    Parent/sibling w/ CABG, MI or angioplasty before 65F 55M? No   Social History Narrative    Not on file     Social Drivers of Health     Financial Resource Strain: Low Risk  (2023)    Financial Resource Strain     Within the past 12 months, have you or your family members you live with been unable to get utilities (heat, electricity) when it was  really needed?: No   Food Insecurity: Low Risk  (12/26/2023)    Food Insecurity     Within the past 12 months, did you worry that your food would run out before you got money to buy more?: No     Within the past 12 months, did the food you bought just not last and you didn t have money to get more?: No   Transportation Needs: Low Risk  (12/26/2023)    Transportation Needs     Within the past 12 months, has lack of transportation kept you from medical appointments, getting your medicines, non-medical meetings or appointments, work, or from getting things that you need?: No   Physical Activity: Not on file   Stress: Not on file   Social Connections: Not on file   Interpersonal Safety: Low Risk  (1/7/2025)    Interpersonal Safety     Do you feel physically and emotionally safe where you currently live?: Yes     Within the past 12 months, have you been hit, slapped, kicked or otherwise physically hurt by someone?: No     Within the past 12 months, have you been humiliated or emotionally abused in other ways by your partner or ex-partner?: No   Housing Stability: Low Risk  (12/26/2023)    Housing Stability     Do you have housing? : Yes     Are you worried about losing your housing?: No       Outpatient Encounter Medications as of 1/9/2025   Medication Sig Dispense Refill    albuterol (PROAIR HFA/PROVENTIL HFA/VENTOLIN HFA) 108 (90 Base) MCG/ACT inhaler       alendronate (FOSAMAX) 70 MG tablet TAKE 1 TABLET BY MOUTH WEEKLY  WITH 8 OZ OF PLAIN WATER 30  MINUTES BEFORE FIRST FOOD, DRINK OR MEDS. STAY UPRIGHT FOR 30  MINS 12 tablet 0    atorvastatin (LIPITOR) 20 MG tablet TAKE 1 TABLET BY MOUTH DAILY 100 tablet 0    budesonide (ENTOCORT EC) 3 MG EC capsule Take 2 capsules (6 mg) by mouth every morning. 60 capsule 1    citalopram (CELEXA) 20 MG tablet TAKE 1 TABLET BY MOUTH DAILY 90 tablet 0    diclofenac (VOLTAREN) 1 % topical gel Apply topically as needed for moderate pain      meclizine (ANTIVERT) 25 MG tablet Take 1  tablet (25 mg) by mouth 3 times daily as needed for dizziness. 20 tablet 0    triamcinolone (KENALOG) 0.1 % external cream Apply topically 2 times daily 30 g 3    Turmeric 500 MG TABS Take by mouth 2 times daily      Vitamin D, Cholecalciferol, 25 MCG (1000 UT) CAPS       zolpidem (AMBIEN) 5 MG tablet Take 1 tablet (5 mg) by mouth nightly as needed for sleep. 30 tablet 0     No facility-administered encounter medications on file as of 1/9/2025.             O:   NAD, WDWN, Alert & Oriented, Mood & Affect wnl, Vitals stable   Here today alone   LMP  (LMP Unknown)    General appearance normal   Vitals stable   Alert, oriented and in no acute distress      Gritty papules nose x 2       Eyes: Conjunctivae/lids:Normal     ENT: Lips: normal    MSK:Normal    Pulm: Breathing Normal    Neuro/Psych: Orientation:Alert and Orientedx3 ; Mood/Affect:normal       A/P:  1. Actinic keratoses on nose x 2  LN2:  Treated with LN2 for 5s for 1-2 cycles. Warned risks of blistering, pain, pigment change, scarring, and incomplete resolution.  Advised patient to return if lesions do not completely resolve.  Wound care sheet given.

## 2025-01-09 NOTE — LETTER
2025      Sara Keita  [address] [facility] [address]      Dear Colleague,    Thank you for referring your patient, Sara Keita, to the [facility]. Please see a copy of my visit note below.    Sara Keita is a pleasant 74 year old year old female patient here today for spots on nose. Present for a few weeks. No pain or bleeding. She notes rough. Feels like previous precancerous areas. Patient has no other skin complaints today.  Remainder of the HPI, Meds, PMH, Allergies, FH, and SH was reviewed in chart.    Pertinent Hx:   History of SCC  Past Medical History:   Diagnosis Date     Hand arthritis 2024     Squamous cell carcinoma of skin, unspecified        Past Surgical History:   Procedure Laterality Date     COLONOSCOPY  2016    Collagenous colitis     ENT SURGERY  20 yr ago    For migraines        Family History   Problem Relation Age of Onset     Diabetes Father         Adult onset at age 86     Glaucoma No family hx of      Macular Degeneration No family hx of        Social History     Socioeconomic History     Marital status: Single     Spouse name: Not on file     Number of children: Not on file     Years of education: Not on file     Highest education level: Not on file   Occupational History     Not on file   Tobacco Use     Smoking status: Former     Current packs/day: 0.00     Average packs/day: 1 pack/day for 7.0 years (7.0 ttl pk-yrs)     Types: Cigarettes     Start date: 1966     Quit date: 1973     Years since quittin.0     Smokeless tobacco: Never     Tobacco comments:     I smoked from  to    Vaping Use     Vaping status: Never Used   Substance and Sexual Activity     Alcohol use: Not Currently     Drug use: Never     Sexual activity: Not Currently     Partners: Male     Birth control/protection: None   Other Topics Concern     Parent/sibling w/ CABG, MI or angioplasty before 65F 55M? No   Social History Narrative     Not on  file     Social Drivers of Health     Financial Resource Strain: Low Risk  (12/26/2023)    Financial Resource Strain      Within the past 12 months, have you or your family members you live with been unable to get utilities (heat, electricity) when it was really needed?: No   Food Insecurity: Low Risk  (12/26/2023)    Food Insecurity      Within the past 12 months, did you worry that your food would run out before you got money to buy more?: No      Within the past 12 months, did the food you bought just not last and you didn t have money to get more?: No   Transportation Needs: Low Risk  (12/26/2023)    Transportation Needs      Within the past 12 months, has lack of transportation kept you from medical appointments, getting your medicines, non-medical meetings or appointments, work, or from getting things that you need?: No   Physical Activity: Not on file   Stress: Not on file   Social Connections: Not on file   Interpersonal Safety: Low Risk  (1/7/2025)    Interpersonal Safety      Do you feel physically and emotionally safe where you currently live?: Yes      Within the past 12 months, have you been hit, slapped, kicked or otherwise physically hurt by someone?: No      Within the past 12 months, have you been humiliated or emotionally abused in other ways by your partner or ex-partner?: No   Housing Stability: Low Risk  (12/26/2023)    Housing Stability      Do you have housing? : Yes      Are you worried about losing your housing?: No       Outpatient Encounter Medications as of 1/9/2025   Medication Sig Dispense Refill     albuterol (PROAIR HFA/PROVENTIL HFA/VENTOLIN HFA) 108 (90 Base) MCG/ACT inhaler        alendronate (FOSAMAX) 70 MG tablet TAKE 1 TABLET BY MOUTH WEEKLY  WITH 8 OZ OF PLAIN WATER 30  MINUTES BEFORE FIRST FOOD, DRINK OR MEDS. STAY UPRIGHT FOR 30  MINS 12 tablet 0     atorvastatin (LIPITOR) 20 MG tablet TAKE 1 TABLET BY MOUTH DAILY 100 tablet 0     budesonide (ENTOCORT EC) 3 MG EC capsule Take  2 capsules (6 mg) by mouth every morning. 60 capsule 1     citalopram (CELEXA) 20 MG tablet TAKE 1 TABLET BY MOUTH DAILY 90 tablet 0     diclofenac (VOLTAREN) 1 % topical gel Apply topically as needed for moderate pain       meclizine (ANTIVERT) 25 MG tablet Take 1 tablet (25 mg) by mouth 3 times daily as needed for dizziness. 20 tablet 0     triamcinolone (KENALOG) 0.1 % external cream Apply topically 2 times daily 30 g 3     Turmeric 500 MG TABS Take by mouth 2 times daily       Vitamin D, Cholecalciferol, 25 MCG (1000 UT) CAPS        zolpidem (AMBIEN) 5 MG tablet Take 1 tablet (5 mg) by mouth nightly as needed for sleep. 30 tablet 0     No facility-administered encounter medications on file as of 1/9/2025.             O:   NAD, WDWN, Alert & Oriented, Mood & Affect wnl, Vitals stable   Here today alone   LMP  (LMP Unknown)    General appearance normal   Vitals stable   Alert, oriented and in no acute distress      Gritty papules nose x 2       Eyes: Conjunctivae/lids:Normal     ENT: Lips: normal    MSK:Normal    Pulm: Breathing Normal    Neuro/Psych: Orientation:Alert and Orientedx3 ; Mood/Affect:normal       A/P:  1. Actinic keratoses on nose x 2  LN2:  Treated with LN2 for 5s for 1-2 cycles. Warned risks of blistering, pain, pigment change, scarring, and incomplete resolution.  Advised patient to return if lesions do not completely resolve.  Wound care sheet given.      Again, thank you for allowing me to participate in the care of your patient.        Sincerely,        Lynne Odom PA-C    Electronically signed

## 2025-02-06 ENCOUNTER — TELEPHONE (OUTPATIENT)
Dept: OPTOMETRY | Facility: CLINIC | Age: 75
End: 2025-02-06
Payer: COMMERCIAL

## 2025-02-06 DIAGNOSIS — H57.89 CONTACT LENS STUCK: Primary | ICD-10-CM

## 2025-03-03 DIAGNOSIS — F41.9 ANXIETY: ICD-10-CM

## 2025-03-03 SDOH — HEALTH STABILITY: PHYSICAL HEALTH: ON AVERAGE, HOW MANY MINUTES DO YOU ENGAGE IN EXERCISE AT THIS LEVEL?: 30 MIN

## 2025-03-03 SDOH — HEALTH STABILITY: PHYSICAL HEALTH: ON AVERAGE, HOW MANY DAYS PER WEEK DO YOU ENGAGE IN MODERATE TO STRENUOUS EXERCISE (LIKE A BRISK WALK)?: 5 DAYS

## 2025-03-03 ASSESSMENT — SOCIAL DETERMINANTS OF HEALTH (SDOH): HOW OFTEN DO YOU GET TOGETHER WITH FRIENDS OR RELATIVES?: MORE THAN THREE TIMES A WEEK

## 2025-03-05 ASSESSMENT — PATIENT HEALTH QUESTIONNAIRE - PHQ9
SUM OF ALL RESPONSES TO PHQ QUESTIONS 1-9: 0
SUM OF ALL RESPONSES TO PHQ QUESTIONS 1-9: 0
10. IF YOU CHECKED OFF ANY PROBLEMS, HOW DIFFICULT HAVE THESE PROBLEMS MADE IT FOR YOU TO DO YOUR WORK, TAKE CARE OF THINGS AT HOME, OR GET ALONG WITH OTHER PEOPLE: NOT DIFFICULT AT ALL

## 2025-03-06 ENCOUNTER — ANCILLARY PROCEDURE (OUTPATIENT)
Dept: BONE DENSITY | Facility: CLINIC | Age: 75
End: 2025-03-06
Attending: INTERNAL MEDICINE
Payer: COMMERCIAL

## 2025-03-06 ENCOUNTER — OFFICE VISIT (OUTPATIENT)
Dept: FAMILY MEDICINE | Facility: CLINIC | Age: 75
End: 2025-03-06
Payer: COMMERCIAL

## 2025-03-06 VITALS
HEART RATE: 60 BPM | DIASTOLIC BLOOD PRESSURE: 67 MMHG | BODY MASS INDEX: 21.34 KG/M2 | WEIGHT: 125 LBS | OXYGEN SATURATION: 98 % | RESPIRATION RATE: 18 BRPM | HEIGHT: 64 IN | SYSTOLIC BLOOD PRESSURE: 124 MMHG

## 2025-03-06 DIAGNOSIS — M81.0 AGE-RELATED OSTEOPOROSIS WITHOUT CURRENT PATHOLOGICAL FRACTURE: ICD-10-CM

## 2025-03-06 DIAGNOSIS — R06.2 WHEEZING: ICD-10-CM

## 2025-03-06 DIAGNOSIS — M43.6 NECK STIFFNESS: ICD-10-CM

## 2025-03-06 DIAGNOSIS — Z00.00 ENCOUNTER FOR MEDICARE ANNUAL WELLNESS EXAM: Primary | ICD-10-CM

## 2025-03-06 DIAGNOSIS — H81.11 BENIGN PAROXYSMAL POSITIONAL VERTIGO, RIGHT: ICD-10-CM

## 2025-03-06 DIAGNOSIS — E78.00 ELEVATED CHOLESTEROL: ICD-10-CM

## 2025-03-06 DIAGNOSIS — G47.00 INSOMNIA, UNSPECIFIED TYPE: ICD-10-CM

## 2025-03-06 DIAGNOSIS — F41.9 ANXIETY: ICD-10-CM

## 2025-03-06 LAB
CHOLEST SERPL-MCNC: 178 MG/DL
ERYTHROCYTE [DISTWIDTH] IN BLOOD BY AUTOMATED COUNT: 13 % (ref 10–15)
FASTING STATUS PATIENT QL REPORTED: NO
HCT VFR BLD AUTO: 40.9 % (ref 35–47)
HDLC SERPL-MCNC: 91 MG/DL
HGB BLD-MCNC: 13.8 G/DL (ref 11.7–15.7)
LDLC SERPL CALC-MCNC: 79 MG/DL
MCH RBC QN AUTO: 31 PG (ref 26.5–33)
MCHC RBC AUTO-ENTMCNC: 33.7 G/DL (ref 31.5–36.5)
MCV RBC AUTO: 92 FL (ref 78–100)
NONHDLC SERPL-MCNC: 87 MG/DL
PLATELET # BLD AUTO: 336 10E3/UL (ref 150–450)
RBC # BLD AUTO: 4.45 10E6/UL (ref 3.8–5.2)
TRIGL SERPL-MCNC: 41 MG/DL
WBC # BLD AUTO: 6.1 10E3/UL (ref 4–11)

## 2025-03-06 PROCEDURE — 77080 DXA BONE DENSITY AXIAL: CPT | Performed by: RADIOLOGY

## 2025-03-06 RX ORDER — CYCLOBENZAPRINE HCL 10 MG
10 TABLET ORAL 3 TIMES DAILY PRN
Qty: 30 TABLET | Refills: 1 | Status: SHIPPED | OUTPATIENT
Start: 2025-03-06

## 2025-03-06 RX ORDER — CITALOPRAM HYDROBROMIDE 20 MG/1
20 TABLET ORAL DAILY
Qty: 90 TABLET | Refills: 3 | Status: SHIPPED | OUTPATIENT
Start: 2025-03-06

## 2025-03-06 RX ORDER — ATORVASTATIN CALCIUM 20 MG/1
20 TABLET, FILM COATED ORAL DAILY
Qty: 100 TABLET | Refills: 3 | Status: SHIPPED | OUTPATIENT
Start: 2025-03-06

## 2025-03-06 RX ORDER — ALBUTEROL SULFATE 90 UG/1
1-2 INHALANT RESPIRATORY (INHALATION) EVERY 4 HOURS PRN
Qty: 18 G | Refills: 2 | Status: SHIPPED | OUTPATIENT
Start: 2025-03-06

## 2025-03-06 RX ORDER — ZOLPIDEM TARTRATE 5 MG/1
5 TABLET ORAL
Qty: 30 TABLET | Refills: 0 | Status: SHIPPED | OUTPATIENT
Start: 2025-03-06

## 2025-03-06 ASSESSMENT — PAIN SCALES - GENERAL: PAINLEVEL_OUTOF10: NO PAIN (0)

## 2025-03-06 NOTE — PATIENT INSTRUCTIONS
Patient Education   Preventive Care Advice   This is general advice given by our system to help you stay healthy. However, your care team may have specific advice just for you. Please talk to your care team about your preventive care needs.  Nutrition  Eat 5 or more servings of fruits and vegetables each day.  Try wheat bread, brown rice and whole grain pasta (instead of white bread, rice, and pasta).  Get enough calcium and vitamin D. Check the label on foods and aim for 100% of the RDA (recommended daily allowance).  Lifestyle  Exercise at least 150 minutes each week  (30 minutes a day, 5 days a week).  Do muscle strengthening activities 2 days a week. These help control your weight and prevent disease.  No smoking.  Wear sunscreen to prevent skin cancer.  Have a dental exam and cleaning every 6 months.  Yearly exams  See your health care team every year to talk about:  Any changes in your health.  Any medicines your care team has prescribed.  Preventive care, family planning, and ways to prevent chronic diseases.  Shots (vaccines)   HPV shots (up to age 26), if you've never had them before.  Hepatitis B shots (up to age 59), if you've never had them before.  COVID-19 shot: Get this shot when it's due.  Flu shot: Get a flu shot every year.  Tetanus shot: Get a tetanus shot every 10 years.  Pneumococcal, hepatitis A, and RSV shots: Ask your care team if you need these based on your risk.  Shingles shot (for age 50 and up)  General health tests  Diabetes screening:  Starting at age 35, Get screened for diabetes at least every 3 years.  If you are younger than age 35, ask your care team if you should be screened for diabetes.  Cholesterol test: At age 39, start having a cholesterol test every 5 years, or more often if advised.  Bone density scan (DEXA): At age 50, ask your care team if you should have this scan for osteoporosis (brittle bones).  Hepatitis C: Get tested at least once in your life.  STIs (sexually  transmitted infections)  Before age 24: Ask your care team if you should be screened for STIs.  After age 24: Get screened for STIs if you're at risk. You are at risk for STIs (including HIV) if:  You are sexually active with more than one person.  You don't use condoms every time.  You or a partner was diagnosed with a sexually transmitted infection.  If you are at risk for HIV, ask about PrEP medicine to prevent HIV.  Get tested for HIV at least once in your life, whether you are at risk for HIV or not.  Cancer screening tests  Cervical cancer screening: If you have a cervix, begin getting regular cervical cancer screening tests starting at age 21.  Breast cancer scan (mammogram): If you've ever had breasts, begin having regular mammograms starting at age 40. This is a scan to check for breast cancer.  Colon cancer screening: It is important to start screening for colon cancer at age 45.  Have a colonoscopy test every 10 years (or more often if you're at risk) Or, ask your provider about stool tests like a FIT test every year or Cologuard test every 3 years.  To learn more about your testing options, visit:   .  For help making a decision, visit:   https://bit.ly/sg64963.  Prostate cancer screening test: If you have a prostate, ask your care team if a prostate cancer screening test (PSA) at age 55 is right for you.  Lung cancer screening: If you are a current or former smoker ages 50 to 80, ask your care team if ongoing lung cancer screenings are right for you.  For informational purposes only. Not to replace the advice of your health care provider. Copyright   2023 [facility]. All rights reserved. Clinically reviewed by the [facility]. [employer] [id number] - REV 01/24.  Preventing Falls: Care Instructions  Injuries and health problems such as trouble walking or poor eyesight can increase your risk of falling. So can some medicines. But there are things you can do to help  "prevent falls. You can exercise to get stronger. You can also arrange your home to make it safer.    Talk to your doctor about the medicines you take. Ask if any of them increase the risk of falls and whether they can be changed or stopped.   Try to exercise regularly. It can help improve your strength and balance. This can help lower your risk of falling.         Practice fall safety and prevention.   Wear low-heeled shoes that fit well and give your feet good support. Talk to your doctor if you have foot problems that make this hard.  Carry a cellphone or wear a medical alert device that you can use to call for help.  Use stepladders instead of chairs to reach high objects. Don't climb if you're at risk for falls. Ask for help, if needed.  Wear the correct eyeglasses, if you need them.        Make your home safer.   Remove rugs, cords, clutter, and furniture from walkways.  Keep your house well lit. Use night-lights in hallways and bathrooms.  Install and use sturdy handrails on stairways.  Wear nonskid footwear, even inside. Don't walk barefoot or in socks without shoes.        Be safe outside.   Use handrails, curb cuts, and ramps whenever possible.  Keep your hands free by using a shoulder bag or backpack.  Try to walk in well-lit areas. Watch out for uneven ground, changes in pavement, and debris.  Be careful in the winter. Walk on the grass or gravel when sidewalks are slippery. Use de-icer on steps and walkways. Add non-slip devices to shoes.    Put grab bars and nonskid mats in your shower or tub and near the toilet. Try to use a shower chair or bath bench when bathing.   Get into a tub or shower by putting in your weaker leg first. Get out with your strong side first. Have a phone or medical alert device in the bathroom with you.   Where can you learn more?  Go to https://www.[employer].net/patiented  Enter G117 in the search box to learn more about \"Preventing Falls: Care Instructions.\"  Current as of: " July 31, 2024  Content Version: 14.3    2024 [employer].   Care instructions adapted under license by your healthcare professional. If you have questions about a medical condition or this instruction, always ask your healthcare professional. [employer] disclaims any warranty or liability for your use of this information.    Hearing Loss: Care Instructions  Overview     Hearing loss is a sudden or slow decrease in how well you hear. It can range from slight to profound. Permanent hearing loss can occur with aging. It also can happen when you are exposed long-term to loud noise. Examples include listening to loud music, riding motorcycles, or being around other loud machines.  Hearing loss can affect your work and home life. It can make you feel lonely or depressed. You may feel that you have lost your independence. But hearing aids and other devices can help you hear better and feel connected to others.  Follow-up care is a key part of your treatment and safety. Be sure to make and go to all appointments, and call your doctor if you are having problems. It's also a good idea to know your test results and keep a list of the medicines you take.  How can you care for yourself at home?  Avoid loud noises whenever possible. This helps keep your hearing from getting worse.  Always wear hearing protection around loud noises.  Wear a hearing aid as directed.  A professional can help you pick a hearing aid that will work best for you.  You can also get hearing aids over the counter for mild to moderate hearing loss.  Have hearing tests as your doctor suggests. They can show whether your hearing has changed. Your hearing aid may need to be adjusted.  Use other devices as needed. These may include:  Telephone amplifiers and hearing aids that can connect to a television, stereo, radio, or microphone.  Devices that use lights or vibrations. These alert you to the doorbell, a ringing telephone, or a baby  "monitor.  Television closed-captioning. This shows the words at the bottom of the screen. Most new TVs can do this.  TTY (text telephone). This lets you type messages back and forth on the telephone instead of talking or listening. These devices are also called TDD. When messages are typed on the keyboard, they are sent over the phone line to a receiving TTY. The message is shown on a monitor.  Use text messaging, social media, and email if it is hard for you to communicate by telephone.  Try to learn a listening technique called speechreading. It is not lipreading. You pay attention to people's gestures, expressions, posture, and tone of voice. These clues can help you understand what a person is saying. Face the person you are talking to, and have them face you. Make sure the lighting is good. You need to see the other person's face clearly.  Think about counseling if you need help to adjust to your hearing loss.  When should you call for help?  Watch closely for changes in your health, and be sure to contact your doctor if:    You think your hearing is getting worse.     You have new symptoms, such as dizziness or nausea.   Where can you learn more?  Go to https://www.[employer].net/patiented  Enter R798 in the search box to learn more about \"Hearing Loss: Care Instructions.\"  Current as of: September 27, 2023  Content Version: 14.3    2024 [employer].   Care instructions adapted under license by your healthcare professional. If you have questions about a medical condition or this instruction, always ask your healthcare professional. [employer] disclaims any warranty or liability for your use of this information.       "

## 2025-03-06 NOTE — PROGRESS NOTES
Preventive Care Visit  [facility]  Jasmin Mcintyre MD, Family Medicine  Mar 6, 2025      Assessment & Plan     Encounter for Medicare annual wellness exam    - **CBC with platelets FUTURE 2mo; Future  - **Basic metabolic panel FUTURE 2mo; Future  - **CBC with platelets FUTURE 2mo  - **Basic metabolic panel FUTURE 2mo    Elevated cholesterol  Refill and check levels  - atorvastatin (LIPITOR) 20 MG tablet; Take 1 tablet (20 mg) by mouth daily.  - Lipid panel reflex to direct LDL Non-fasting; Future  - Lipid panel reflex to direct LDL Non-fasting    Anxiety  Meds working well, will refill  - citalopram (CELEXA) 20 MG tablet; Take 1 tablet (20 mg) by mouth daily.    Wheezing  Uses intermittently when gets URI  - albuterol (PROAIR HFA/PROVENTIL HFA/VENTOLIN HFA) 108 (90 Base) MCG/ACT inhaler; Inhale 1-2 puffs into the lungs every 4 hours as needed for shortness of breath, wheezing or cough.    Benign paroxysmal positional vertigo, right  Flaring again. Would like to go to PT  - Physical Therapy  Referral; Future    Insomnia, unspecified type  Refill to use as needed  - zolpidem (AMBIEN) 5 MG tablet; Take 1 tablet (5 mg) by mouth nightly as needed for sleep.    Neck stiffness  To use intermittently as needed  - cyclobenzaprine (FLEXERIL) 10 MG tablet; Take 1 tablet (10 mg) by mouth 3 times daily as needed for muscle spasms.    Patient has been advised of split billing requirements and indicates understanding: Yes    The longitudinal plan of care for the diagnosis(es)/condition(s) as documented were addressed during this visit. Due to the added complexity in care, I will continue to support Amy in the subsequent management and with ongoing continuity of care.      Counseling  Appropriate preventive services were addressed with this patient via screening, questionnaire, or discussion as appropriate for fall prevention, nutrition, physical activity, Tobacco-use cessation, social  engagement, weight loss and cognition.  Checklist reviewing preventive services available has been given to the patient.  Reviewed patient's diet, addressing concerns and/or questions.   The patient was provided with written information regarding signs of hearing loss.           Mariah Reyes is a 74 year old, presenting for the following:  Physical        3/6/2025    10:46 AM   Additional Questions   Roomed by yariel   Accompanied by self     HPI  Pt needs med refills  She is doing well  No concerns other than flare up of BPPV on the right  Has done PT in the past and has helped    Gets intermittent neck pain and muscle relaxer helps. She would like refill         Advance Care Planning  Patient does not have a Health Care Directive: Discussed advance care planning with patient; information given to patient to review.      3/3/2025   General Health   How would you rate your overall physical health? Good   Feel stress (tense, anxious, or unable to sleep) Not at all         3/3/2025   Nutrition   Diet: Other   If other, please elaborate: I need lots of fiber (Metamucil) and limited amounts of fresh raw vegetables.         3/3/2025   Exercise   Days per week of moderate/strenous exercise 5 days   Average minutes spent exercising at this level 30 min         3/3/2025   Social Factors   Frequency of gathering with friends or relatives More than three times a week   Worry food won't last until get money to buy more No   Food not last or not have enough money for food? No   Do you have housing? (Housing is defined as stable permanent housing and does not include staying ouside in a car, in a tent, in an abandoned building, in an overnight shelter, or couch-surfing.) Yes   Are you worried about losing your housing? No   Lack of transportation? No   Unable to get utilities (heat,electricity)? No         3/6/2025   Fall Risk   Gait Speed Test (Document in seconds) 3.2   Gait Speed Test Interpretation Less than or equal to  5.00 seconds - PASS          3/3/2025   Activities of Daily Living- Home Safety   Needs help with the following daily activites None of the above   Safety concerns in the home None of the above         3/3/2025   Dental   Dentist two times every year? Yes         3/3/2025   Hearing Screening   Hearing concerns? (!) I FEEL THAT PEOPLE ARE MUMBLING OR NOT SPEAKING CLEARLY.    (!) I NEED TO ASK PEOPLE TO SPEAK UP OR REPEAT THEMSELVES.    (!) IT'S HARD TO FOLLOW A CONVERSATION IN A NOISY RESTAURANT OR CROWDED ROOM.    (!) TROUBLE UNDERSTANDING SOFT OR WHISPERED SPEECH.       Multiple values from one day are sorted in reverse-chronological order         3/3/2025   Driving Risk Screening   Patient/family members have concerns about driving No         3/3/2025   General Alertness/Fatigue Screening   Have you been more tired than usual lately? No         3/3/2025   Urinary Incontinence Screening   Bothered by leaking urine in past 6 months No          Today's PHQ-9 Score:       3/5/2025     3:49 PM   PHQ-9 SCORE   PHQ-9 Total Score MyChart 0   PHQ-9 Total Score 0        Patient-reported         3/3/2025   Substance Use   Alcohol more than 3/day or more than 7/wk Not Applicable   Do you have a current opioid prescription? No   How severe/bad is pain from 1 to 10? 3/10   Do you use any other substances recreationally? No     Social History     Tobacco Use    Smoking status: Former     Current packs/day: 0.00     Average packs/day: 1 pack/day for 7.0 years (7.0 ttl pk-yrs)     Types: Cigarettes     Start date: 1966     Quit date: 1973     Years since quittin.2    Smokeless tobacco: Never    Tobacco comments:     I smoked from  to    Vaping Use    Vaping status: Never Used   Substance Use Topics    Alcohol use: Not Currently     Comment: I quit Dec. 31, 1987    Drug use: Never           3/29/2024   LAST FHS-7 RESULTS   1st degree relative breast or ovarian cancer No   Any relative bilateral breast cancer No    Any male have breast cancer No   Any ONE woman have BOTH breast AND ovarian cancer No   Any woman with breast cancer before 50yrs No   2 or more relatives with breast AND/OR ovarian cancer No   2 or more relatives with breast AND/OR bowel cancer No        Mammogram Screening - After age 74- determine frequency with patient based on health status, life expectancy and patient goals    ASCVD Risk   The 10-year ASCVD risk score (Bre BARBOZA, et al., 2019) is: 16.8%    Values used to calculate the score:      Age: 74 years      Sex: Female      Is Non- : No      Diabetic: No      Tobacco smoker: No      Systolic Blood Pressure: 124 mmHg      Is BP treated: No      HDL Cholesterol: 67 mg/dL      Total Cholesterol: 151 mg/dL            Reviewed and updated as needed this visit by Provider                      Current providers sharing in care for this patient include:  Patient Care Team:  Jasmin Mcintyre MD as PCP - General (Family Medicine)  Jasmin Mcintyre MD as Assigned PCP  Jasmin Mcintyre MD as Referring Physician (Family Medicine)  Lynne Gavin PA-C as Physician Assistant (Dermatology)  Sylvester Matute MD as MD (Gastroenterology)  Vicki Fofana APRN CNP as Nurse Practitioner (Dermatology)  Graham Flores MD as MD (Endocrinology, Diabetes, and Metabolism)  Vicki Fofana APRN CNP as Assigned Surgical Provider  Shea Brown MD as Assigned Musculoskeletal Provider  Graham Flores MD as Assigned Endocrinology Provider    The following health maintenance items are reviewed in Epic and correct as of today:  Health Maintenance   Topic Date Due    ZOSTER IMMUNIZATION (2 of 3) 02/11/2021    RSV VACCINE (1 - 1-dose 75+ series) Never done    ANNUAL REVIEW OF HM ORDERS  07/31/2024    LIPID  12/26/2024    COVID-19 Vaccine (7 - 2024-25 season) 03/16/2025    PHQ-9  09/06/2025    MEDICARE ANNUAL WELLNESS VISIT  03/06/2026    FALL RISK  "ASSESSMENT  03/06/2026    GLUCOSE  12/26/2026    ADVANCE CARE PLANNING  12/26/2028    DTAP/TDAP/TD IMMUNIZATION (4 - Td or Tdap) 06/10/2029    DEXA  02/08/2038    INFLUENZA VACCINE  Completed    Pneumococcal Vaccine: 50+ Years  Completed    HEPATITIS C SCREENING  Addressed    DEPRESSION ACTION PLAN  Addressed    HPV IMMUNIZATION  Aged Out    MENINGITIS IMMUNIZATION  Aged Out    MAMMO SCREENING  Discontinued    COLORECTAL CANCER SCREENING  Discontinued         Review of Systems  Constitutional, HEENT, cardiovascular, pulmonary, gi and gu systems are negative, except as otherwise noted.     Objective    Exam  /67   Pulse 60   Resp 18   Ht 1.626 m (5' 4\")   Wt 56.7 kg (125 lb)   LMP  (LMP Unknown)   SpO2 98%   BMI 21.46 kg/m     Estimated body mass index is 21.46 kg/m  as calculated from the following:    Height as of this encounter: 1.626 m (5' 4\").    Weight as of this encounter: 56.7 kg (125 lb).    Physical Exam  GENERAL: alert and no distress  EYES: Eyes grossly normal to inspection, PERRL and conjunctivae and sclerae normal  HENT: ear canals and TM's normal, nose and mouth without ulcers or lesions  NECK: no adenopathy, no asymmetry, masses, or scars  RESP: lungs clear to auscultation - no rales, rhonchi or wheezes  CV: regular rate and rhythm, normal S1 S2, no S3 or S4, no murmur, click or rub, no peripheral edema  ABDOMEN: soft, nontender, no hepatosplenomegaly, no masses and bowel sounds normal  MS: no gross musculoskeletal defects noted, no edema  SKIN: no suspicious lesions or rashes  NEURO: Normal strength and tone, mentation intact and speech normal  PSYCH: mentation appears normal, affect normal/bright        3/6/2025   Mini Cog   Mini-Cog Not Completed (choose reason) Patient declines    Patient declines       Multiple values from one day are sorted in reverse-chronological order       Patient declines, there are NO concerns for cognitive deficits.           Signed Electronically by: Jasmin" MD Miguelina    Answers submitted by the patient for this visit:  Patient Health Questionnaire (Submitted on 3/5/2025)  If you checked off any problems, how difficult have these problems made it for you to do your work, take care of things at home, or get along with other people?: Not difficult at all  PHQ9 TOTAL SCORE: 0

## 2025-03-11 ENCOUNTER — OFFICE VISIT (OUTPATIENT)
Dept: ENDOCRINOLOGY | Facility: CLINIC | Age: 75
End: 2025-03-11
Payer: COMMERCIAL

## 2025-03-11 VITALS
BODY MASS INDEX: 22.26 KG/M2 | HEART RATE: 56 BPM | DIASTOLIC BLOOD PRESSURE: 71 MMHG | WEIGHT: 129.7 LBS | OXYGEN SATURATION: 97 % | SYSTOLIC BLOOD PRESSURE: 106 MMHG

## 2025-03-11 DIAGNOSIS — M81.0 AGE-RELATED OSTEOPOROSIS WITHOUT CURRENT PATHOLOGICAL FRACTURE: Primary | ICD-10-CM

## 2025-03-11 PROCEDURE — 3074F SYST BP LT 130 MM HG: CPT | Performed by: INTERNAL MEDICINE

## 2025-03-11 PROCEDURE — 3078F DIAST BP <80 MM HG: CPT | Performed by: INTERNAL MEDICINE

## 2025-03-11 PROCEDURE — 99214 OFFICE O/P EST MOD 30 MIN: CPT | Performed by: INTERNAL MEDICINE

## 2025-03-11 PROCEDURE — G2211 COMPLEX E/M VISIT ADD ON: HCPCS | Performed by: INTERNAL MEDICINE

## 2025-03-11 RX ORDER — ALENDRONATE SODIUM 70 MG/1
70 TABLET ORAL
Qty: 12 TABLET | Refills: 3 | Status: SHIPPED | OUTPATIENT
Start: 2025-03-17

## 2025-03-11 RX ORDER — CHOLECALCIFEROL (VITAMIN D3) 50 MCG
1 TABLET ORAL DAILY
COMMUNITY

## 2025-03-11 RX ORDER — CHLORAL HYDRATE 500 MG
1 CAPSULE ORAL DAILY
COMMUNITY

## 2025-03-11 RX ORDER — MULTIVITAMIN
1 TABLET ORAL DAILY
COMMUNITY

## 2025-03-11 NOTE — NURSING NOTE
Sara Keita's goals for this visit include:   Chief Complaint   Patient presents with    Endocrine Problem     Osteopenia     She requests these members of her care team be copied on today's visit information: No    PCP: Jasmin Mcintyre    Referring Provider:  Referred Self, MD  No address on file    /71 (BP Location: Left arm, Patient Position: Sitting, Cuff Size: Adult Regular)   Pulse 56   Wt 58.8 kg (129 lb 11.2 oz)   LMP  (LMP Unknown)   SpO2 97%   BMI 22.26 kg/m      Do you need any medication refills at today's visit? No

## 2025-03-11 NOTE — LETTER
3/11/2025      Sara Keita  [address] [facility] [id number]      Dear Colleague,    Thank you for referring your patient, Sara Keita, to the [facility]. Please see a copy of my visit note below.    Endocrinology Clinic Visit    Chief Complaint: Endocrine Problem (Osteopenia)     Information obtained from:Patient      Assessment/Treatment Plan:    Osteoporosis    Previous treatment with Fosamax 2018-January 2023  Treatment holiday: February 2023 -  March 2025 - 2 years.   I have personally reviewed DEXA scan from 3/6/25 and agree with interpretation of -   Lumbar spine T-score in region of L1-L4= -2.4   L1-L4 percent change: -4.3%    HIPS:  Mean total hip T-score: -1.7  Mean total hip percent change: -3.0%    Left femoral neck T-score = -1.9  Right femoral neck T-score= -1.9   Interval bone loss of 4.3% and 3% percent at the lumbar spine and total hip respectively.  FRAX score above 3% at the hip.  C telopeptide pending.  Based on this we discussed recommendation was given to resume Fosamax treatment.  Risk of this medication including esophagitis addressed.  Will recheck C telopeptide again 6 months after starting Fosamax.  Plan  Weight bearing Exercises  Fall prevention  Adequate Calcium and vitamin D intake: for maintenance calcium 1200 mg daily  from all sources and vitamin D 1000 IU daily.    Fosamax 70 mg every 7 days  Check C telopeptide right now and then 6 months later.    Test and/or medications prescribed today:  Orders Placed This Encounter   Procedures     C-Telopeptide, Beta-Cross-Linked     C-Telopeptide, Beta-Cross-Linked     Basic metabolic panel         Graham Flores MD  Staff Endocrinologist    Division of Endocrinology and Diabetes      Subjective:         HPI: Sara Keita is a 74 year old female with history of osteoporosis who is seen for a follow-up.    Never had regular menses but was started on hormone replacement therapy at the age of 40  "and has been on HRT for 15 years until 2007.  Diagnosed with osteopenia in 2012 and was noted to continue to have osteopenia between [id number].  Osteoporosis diagnosis was made in 2018 and at that time was started on alendronate treatment and has taken alendronate between 2018-January 2023.  She was noted to have improvement in bone density in 2021 and again in 2023.  Past medical history of microscopic colitis for which she has been on budesonide > 3 months oral.  Previous fracture after the age of 50- never   Age >65-yes  Loss of height = 1.5 inches   Low body mass index; weight less than 127 pounds; 125 pounds (80's 130-140)  Family/Parental history of hip fracture/Osteoporosis - no  Smoking - college -quitted 1973,  alcohol quitted 1987  Fracture hx:  Chronic glucocorticoid use: Budesonide as documented above.  Previous osteoporosis treatment(s): Fosamax for about 6 years  Hx of hypercalcemia: no   Hx of nephrolithiasis: no   No history of hypercalcemia or nephrolithiasis.  No history of bone cancer in the family.  No history of external beam radiation therapy.  No history of MI or CVA.  Contraindications to osteoporosis treatment options:     No upcoming invasive dental work.  Secondary causes of osteoporosis:     Calcium (yougurt, calcium, cheese) and vitamin D intake- 1000 international unit(s) daily   Exercise - yoga, walking, run 3 miles,   \"   Lumbar spine T-score in region of L1-L4= -2.4   L1-L4 percent change: -4.3%      HIPS:  Mean total hip T-score: -1.7  Mean total hip percent change: -3.0%      Left femoral neck T-score = -1.9  Right femoral neck T-score= -1.9 \"      No Known Allergies    Current Outpatient Medications   Medication Sig Dispense Refill     albuterol (PROAIR HFA/PROVENTIL HFA/VENTOLIN HFA) 108 (90 Base) MCG/ACT inhaler Inhale 1-2 puffs into the lungs every 4 hours as needed for shortness of breath, wheezing or cough. 18 g 2     [START ON 3/17/2025] alendronate (FOSAMAX) 70 MG tablet " Take 1 tablet (70 mg) by mouth every 7 days. Take 60 minutes before am meal with 8 oz. water. Remain upright for 30 minutes. 12 tablet 3     atorvastatin (LIPITOR) 20 MG tablet Take 1 tablet (20 mg) by mouth daily. 100 tablet 3     budesonide (ENTOCORT EC) 3 MG EC capsule Take 2 capsules (6 mg) by mouth every morning. 60 capsule 1     CALCIUM MAGNESIUM ZINC PO Take 1 tablet by mouth daily. Serving size is 2 tablets daily  Vitamin D3 800 international unit(s) per serving size  Calcium 500 mg per serving size       citalopram (CELEXA) 20 MG tablet Take 1 tablet (20 mg) by mouth daily. 90 tablet 3     citalopram (CELEXA) 20 MG tablet TAKE 1 TABLET BY MOUTH DAILY 90 tablet 3     cyclobenzaprine (FLEXERIL) 10 MG tablet Take 1 tablet (10 mg) by mouth 3 times daily as needed for muscle spasms. 30 tablet 1     fish oil-omega-3 fatty acids 1000 MG capsule Take 1 g by mouth daily.       Misc Natural Products (GLUCOSAMINE CHOND MSM FORMULA PO) Take 1 capsule by mouth daily. Serving size is 2 capsule daily       multivitamin w/minerals (MULTI-VITAMIN) tablet Take 1 tablet by mouth daily. Vitamin D 1,000 international unit(s) per tablet  Calcium 220 mg per tab       Probiotic Product ([place] PROBIOTIC/SUPER GREENS) POWD Take 1 Stick by mouth daily. Calcium 29 mg per 1 stick       psyllium (METAMUCIL/KONSYL) 58.6 % powder Take 3 Tablespoonful by mouth daily.       triamcinolone (KENALOG) 0.1 % external cream Apply topically 2 times daily 30 g 3     UNABLE TO FIND MEDICATION NAME: bone broth protein  Protein, sodium and potassium       vitamin D3 (CHOLECALCIFEROL) 50 mcg (2000 units) tablet Take 1 tablet by mouth daily.       zolpidem (AMBIEN) 5 MG tablet Take 1 tablet (5 mg) by mouth nightly as needed for sleep. 30 tablet 0     Turmeric 500 MG TABS Take by mouth 2 times daily (Patient not taking: Reported on 3/11/2025)         Review of Systems     8 point review system (Constitutional, HENT, Eyes, Respiratory, Cardiovascular,  Gastrointestinal, Genitourinary, Musculoskeletal,Neurological, Psychiatric/Behavioural, Endocrine) is negative or is as per HPI above    Past Medical History:   Diagnosis Date     Depressive disorder 2006     Hand arthritis 2024     Squamous cell carcinoma of skin, unspecified        Past Surgical History:   Procedure Laterality Date     COLONOSCOPY  2016    Collagenous colitis     ENT SURGERY  20 yr ago    For migraines       Family History   Problem Relation Age of Onset     Diabetes Father         Adult onset at age 86     Glaucoma No family hx of      Macular Degeneration No family hx of        Social History     Socioeconomic History     Marital status: Single   Tobacco Use     Smoking status: Former     Current packs/day: 0.00     Average packs/day: 1 pack/day for 7.0 years (7.0 ttl pk-yrs)     Types: Cigarettes     Start date: 1966     Quit date: 1973     Years since quittin.6     Smokeless tobacco: Never     Tobacco comments:     I smoked from  to    Vaping Use     Vaping status: Never Used   Substance and Sexual Activity     Alcohol use: Not Currently     Drug use: Never     Sexual activity: Not Currently     Partners: Male     Birth control/protection: None   Other Topics Concern     Parent/sibling w/ CABG, MI or angioplasty before 65F 55M? No     Social Determinants of Health     Financial Resource Strain: Low Risk  (2023)    Financial Resource Strain      Within the past 12 months, have you or your family members you live with been unable to get utilities (heat, electricity) when it was really needed?: No   Food Insecurity: Low Risk  (2023)    Food Insecurity      Within the past 12 months, did you worry that your food would run out before you got money to buy more?: No      Within the past 12 months, did the food you bought just not last and you didn t have money to get more?: No   Transportation Needs: Low Risk  (2023)    Transportation Needs      Within the  past 12 months, has lack of transportation kept you from medical appointments, getting your medicines, non-medical meetings or appointments, work, or from getting things that you need?: No   Interpersonal Safety: Low Risk  (12/26/2023)    Interpersonal Safety      Do you feel physically and emotionally safe where you currently live?: Yes      Within the past 12 months, have you been hit, slapped, kicked or otherwise physically hurt by someone?: No      Within the past 12 months, have you been humiliated or emotionally abused in other ways by your partner or ex-partner?: No   Housing Stability: Low Risk  (12/26/2023)    Housing Stability      Do you have housing? : Yes      Are you worried about losing your housing?: No       Objective:   /71 (BP Location: Left arm, Patient Position: Sitting, Cuff Size: Adult Regular)   Pulse 56   Wt 58.8 kg (129 lb 11.2 oz)   LMP  (LMP Unknown)   SpO2 97%   BMI 22.26 kg/m    Constitutional: Pleasant no acute cardiopulmonary distress.   EYES: anicteric, normal extra-ocular movements, no lid lag or retraction, is equal and reactive to light bilaterally.  HEENT: Mouth/Throat: Mucous membrane is moist. Oropharynx is clear.     Cardiovascular: RRR, S1, S2 normal.   Pulmonary/Chest: CTAB. No wheezing or rales.   Abdominal: +BS. Non tender to palpation.  Stretch marks: None  Neurological: Alert and oriented.  No tremor and reflexes are symmetrical bilaterally and within the normal limits. Muscle strength 5/5.   Extremities: No edema.  Spine: No point tenderness   Psychological: appropriate mood and affect    In House Labs:     Creatinine   Date Value Ref Range Status   03/06/2025 0.58 0.51 - 0.95 mg/dL Final   ]  Component      Latest Ref Rng 8/13/2024  3:40 PM   Creatinine      0.51 - 0.95 mg/dL 0.58    GFR Estimate      >60 mL/min/1.73m2 >90    Albumin      3.5 - 5.2 g/dL 4.1    Calcium      8.8 - 10.4 mg/dL 9.0    Phosphorus      2.5 - 4.5 mg/dL 4.1    Urea Nitrogen      8.0  - 23.0 mg/dL 11.5        This note has been dictated using voice recognition software.  As a result, there may be errors in the documentation that have gone undetected.  Please consider this when interpreting information in this documentation.    The longitudinal plan of care for the diagnosis(es)/condition(s) as documented were addressed during this visit. Due to the added complexity in care, I will continue to support Amy in the subsequent management and with ongoing continuity of care.      Again, thank you for allowing me to participate in the care of your patient.        Sincerely,        Graham Folres MD    Electronically signed

## 2025-03-11 NOTE — PATIENT INSTRUCTIONS
Weight bearing Exercises  Fall prevention  Adequate Calcium and vitamin D intake: for maintenance calcium 1200 mg daily from all sources and vitamin D 1000 IU daily.    Fasting blood work at 8:00 am morning.   Orders Placed This Encounter   Procedures    C-Telopeptide, Beta-Cross-Linked    C-Telopeptide, Beta-Cross-Linked            [facility] Address:   [place] Address:     [address] [place] [address]    Phone: [phone]  Fax: [phone] [address] [place] [address]    Phone: [phone]  Fax: [phone]     [facility] Cost Estimate Phone Number: [phone]    General Lab and Imaging Scheduling Phone Number: [phone]

## 2025-03-11 NOTE — PROGRESS NOTES
Endocrinology Clinic Visit    Chief Complaint: Endocrine Problem (Osteopenia)     Information obtained from:Patient      Assessment/Treatment Plan:    Osteoporosis    Previous treatment with Fosamax 2018-January 2023  Treatment holiday: February 2023 -  March 2025 - 2 years.   I have personally reviewed DEXA scan from 3/6/25 and agree with interpretation of -   Lumbar spine T-score in region of L1-L4= -2.4   L1-L4 percent change: -4.3%    HIPS:  Mean total hip T-score: -1.7  Mean total hip percent change: -3.0%    Left femoral neck T-score = -1.9  Right femoral neck T-score= -1.9   Interval bone loss of 4.3% and 3% percent at the lumbar spine and total hip respectively.  FRAX score above 3% at the hip.  C telopeptide pending.  Based on this we discussed recommendation was given to resume Fosamax treatment.  Risk of this medication including esophagitis addressed.  Will recheck C telopeptide again 6 months after starting Fosamax.  Plan  Weight bearing Exercises  Fall prevention  Adequate Calcium and vitamin D intake: for maintenance calcium 1200 mg daily  from all sources and vitamin D 1000 IU daily.    Fosamax 70 mg every 7 days  Check C telopeptide right now and then 6 months later.    Test and/or medications prescribed today:  Orders Placed This Encounter   Procedures    C-Telopeptide, Beta-Cross-Linked    C-Telopeptide, Beta-Cross-Linked    Basic metabolic panel         Graham Flores MD  Staff Endocrinologist    Division of Endocrinology and Diabetes      Subjective:         HPI: Sara Keita is a 74 year old female with history of osteoporosis who is seen for a follow-up.    Never had regular menses but was started on hormone replacement therapy at the age of 40 and has been on HRT for 15 years until 2007.  Diagnosed with osteopenia in 2012 and was noted to continue to have osteopenia between 1851-2133.  Osteoporosis diagnosis was made in 2018 and at that time was started on alendronate treatment and  "has taken alendronate between 2018-January 2023.  She was noted to have improvement in bone density in 2021 and again in 2023.  Past medical history of microscopic colitis for which she has been on budesonide > 3 months oral.  Previous fracture after the age of 50- never   Age >65-yes  Loss of height = 1.5 inches   Low body mass index; weight less than 127 pounds; 125 pounds (80's 130-140)  Family/Parental history of hip fracture/Osteoporosis - no  Smoking - college -quitted 1973,  alcohol quitted 1987  Fracture hx:  Chronic glucocorticoid use: Budesonide as documented above.  Previous osteoporosis treatment(s): Fosamax for about 6 years  Hx of hypercalcemia: no   Hx of nephrolithiasis: no   No history of hypercalcemia or nephrolithiasis.  No history of bone cancer in the family.  No history of external beam radiation therapy.  No history of MI or CVA.  Contraindications to osteoporosis treatment options:     No upcoming invasive dental work.  Secondary causes of osteoporosis:     Calcium (yougurt, calcium, cheese) and vitamin D intake- 1000 international unit(s) daily   Exercise - yoga, walking, run 3 miles,   \"   Lumbar spine T-score in region of L1-L4= -2.4   L1-L4 percent change: -4.3%      HIPS:  Mean total hip T-score: -1.7  Mean total hip percent change: -3.0%      Left femoral neck T-score = -1.9  Right femoral neck T-score= -1.9 \"      No Known Allergies    Current Outpatient Medications   Medication Sig Dispense Refill    albuterol (PROAIR HFA/PROVENTIL HFA/VENTOLIN HFA) 108 (90 Base) MCG/ACT inhaler Inhale 1-2 puffs into the lungs every 4 hours as needed for shortness of breath, wheezing or cough. 18 g 2    [START ON 3/17/2025] alendronate (FOSAMAX) 70 MG tablet Take 1 tablet (70 mg) by mouth every 7 days. Take 60 minutes before am meal with 8 oz. water. Remain upright for 30 minutes. 12 tablet 3    atorvastatin (LIPITOR) 20 MG tablet Take 1 tablet (20 mg) by mouth daily. 100 tablet 3    budesonide " (ENTOCORT EC) 3 MG EC capsule Take 2 capsules (6 mg) by mouth every morning. 60 capsule 1    CALCIUM MAGNESIUM ZINC PO Take 1 tablet by mouth daily. Serving size is 2 tablets daily  Vitamin D3 800 international unit(s) per serving size  Calcium 500 mg per serving size      citalopram (CELEXA) 20 MG tablet Take 1 tablet (20 mg) by mouth daily. 90 tablet 3    citalopram (CELEXA) 20 MG tablet TAKE 1 TABLET BY MOUTH DAILY 90 tablet 3    cyclobenzaprine (FLEXERIL) 10 MG tablet Take 1 tablet (10 mg) by mouth 3 times daily as needed for muscle spasms. 30 tablet 1    fish oil-omega-3 fatty acids 1000 MG capsule Take 1 g by mouth daily.      Misc Natural Products (GLUCOSAMINE CHOND MSM FORMULA PO) Take 1 capsule by mouth daily. Serving size is 2 capsule daily      multivitamin w/minerals (MULTI-VITAMIN) tablet Take 1 tablet by mouth daily. Vitamin D 1,000 international unit(s) per tablet  Calcium 220 mg per tab      Probiotic Product (Your Tribute PROBIOTIC/SUPER GREENS) POWD Take 1 Stick by mouth daily. Calcium 29 mg per 1 stick      psyllium (METAMUCIL/KONSYL) 58.6 % powder Take 3 Tablespoonful by mouth daily.      triamcinolone (KENALOG) 0.1 % external cream Apply topically 2 times daily 30 g 3    UNABLE TO FIND MEDICATION NAME: bone broth protein  Protein, sodium and potassium      vitamin D3 (CHOLECALCIFEROL) 50 mcg (2000 units) tablet Take 1 tablet by mouth daily.      zolpidem (AMBIEN) 5 MG tablet Take 1 tablet (5 mg) by mouth nightly as needed for sleep. 30 tablet 0    Turmeric 500 MG TABS Take by mouth 2 times daily (Patient not taking: Reported on 3/11/2025)         Review of Systems     8 point review system (Constitutional, HENT, Eyes, Respiratory, Cardiovascular, Gastrointestinal, Genitourinary, Musculoskeletal,Neurological, Psychiatric/Behavioural, Endocrine) is negative or is as per HPI above    Past Medical History:   Diagnosis Date    Depressive disorder 2006    Hand arthritis 04/04/2024    Squamous cell carcinoma  of skin, unspecified        Past Surgical History:   Procedure Laterality Date    COLONOSCOPY  2016    Collagenous colitis    ENT SURGERY  20 yr ago    For migraines       Family History   Problem Relation Age of Onset    Diabetes Father         Adult onset at age 86    Glaucoma No family hx of     Macular Degeneration No family hx of        Social History     Socioeconomic History    Marital status: Single   Tobacco Use    Smoking status: Former     Current packs/day: 0.00     Average packs/day: 1 pack/day for 7.0 years (7.0 ttl pk-yrs)     Types: Cigarettes     Start date: 1966     Quit date: 1973     Years since quittin.6    Smokeless tobacco: Never    Tobacco comments:     I smoked from  to    Vaping Use    Vaping status: Never Used   Substance and Sexual Activity    Alcohol use: Not Currently    Drug use: Never    Sexual activity: Not Currently     Partners: Male     Birth control/protection: None   Other Topics Concern    Parent/sibling w/ CABG, MI or angioplasty before 65F 55M? No     Social Determinants of Health     Financial Resource Strain: Low Risk  (2023)    Financial Resource Strain     Within the past 12 months, have you or your family members you live with been unable to get utilities (heat, electricity) when it was really needed?: No   Food Insecurity: Low Risk  (2023)    Food Insecurity     Within the past 12 months, did you worry that your food would run out before you got money to buy more?: No     Within the past 12 months, did the food you bought just not last and you didn t have money to get more?: No   Transportation Needs: Low Risk  (2023)    Transportation Needs     Within the past 12 months, has lack of transportation kept you from medical appointments, getting your medicines, non-medical meetings or appointments, work, or from getting things that you need?: No   Interpersonal Safety: Low Risk  (2023)    Interpersonal Safety     Do you feel  physically and emotionally safe where you currently live?: Yes     Within the past 12 months, have you been hit, slapped, kicked or otherwise physically hurt by someone?: No     Within the past 12 months, have you been humiliated or emotionally abused in other ways by your partner or ex-partner?: No   Housing Stability: Low Risk  (12/26/2023)    Housing Stability     Do you have housing? : Yes     Are you worried about losing your housing?: No       Objective:   /71 (BP Location: Left arm, Patient Position: Sitting, Cuff Size: Adult Regular)   Pulse 56   Wt 58.8 kg (129 lb 11.2 oz)   LMP  (LMP Unknown)   SpO2 97%   BMI 22.26 kg/m    Constitutional: Pleasant no acute cardiopulmonary distress.   EYES: anicteric, normal extra-ocular movements, no lid lag or retraction, is equal and reactive to light bilaterally.  HEENT: Mouth/Throat: Mucous membrane is moist. Oropharynx is clear.     Cardiovascular: RRR, S1, S2 normal.   Pulmonary/Chest: CTAB. No wheezing or rales.   Abdominal: +BS. Non tender to palpation.  Stretch marks: None  Neurological: Alert and oriented.  No tremor and reflexes are symmetrical bilaterally and within the normal limits. Muscle strength 5/5.   Extremities: No edema.  Spine: No point tenderness   Psychological: appropriate mood and affect    In House Labs:     Creatinine   Date Value Ref Range Status   03/06/2025 0.58 0.51 - 0.95 mg/dL Final   ]  Component      Latest Ref Rng 8/13/2024  3:40 PM   Creatinine      0.51 - 0.95 mg/dL 0.58    GFR Estimate      >60 mL/min/1.73m2 >90    Albumin      3.5 - 5.2 g/dL 4.1    Calcium      8.8 - 10.4 mg/dL 9.0    Phosphorus      2.5 - 4.5 mg/dL 4.1    Urea Nitrogen      8.0 - 23.0 mg/dL 11.5        This note has been dictated using voice recognition software.  As a result, there may be errors in the documentation that have gone undetected.  Please consider this when interpreting information in this documentation.    The longitudinal plan of care for  the diagnosis(es)/condition(s) as documented were addressed during this visit. Due to the added complexity in care, I will continue to support Amy in the subsequent management and with ongoing continuity of care.

## 2025-03-17 ENCOUNTER — LAB (OUTPATIENT)
Dept: LAB | Facility: CLINIC | Age: 75
End: 2025-03-17
Payer: COMMERCIAL

## 2025-03-17 ENCOUNTER — THERAPY VISIT (OUTPATIENT)
Dept: PHYSICAL THERAPY | Facility: CLINIC | Age: 75
End: 2025-03-17
Attending: FAMILY MEDICINE
Payer: COMMERCIAL

## 2025-03-17 DIAGNOSIS — H81.11 BENIGN PAROXYSMAL POSITIONAL VERTIGO, RIGHT: ICD-10-CM

## 2025-03-17 DIAGNOSIS — M81.0 AGE-RELATED OSTEOPOROSIS WITHOUT CURRENT PATHOLOGICAL FRACTURE: ICD-10-CM

## 2025-03-17 LAB
ANION GAP SERPL CALCULATED.3IONS-SCNC: 10 MMOL/L (ref 7–15)
BUN SERPL-MCNC: 10 MG/DL (ref 8–23)
CALCIUM SERPL-MCNC: 9 MG/DL (ref 8.8–10.4)
CHLORIDE SERPL-SCNC: 101 MMOL/L (ref 98–107)
CREAT SERPL-MCNC: 0.6 MG/DL (ref 0.51–0.95)
EGFRCR SERPLBLD CKD-EPI 2021: >90 ML/MIN/1.73M2
GLUCOSE SERPL-MCNC: 96 MG/DL (ref 70–99)
HCO3 SERPL-SCNC: 25 MMOL/L (ref 22–29)
POTASSIUM SERPL-SCNC: 3.9 MMOL/L (ref 3.4–5.3)
SODIUM SERPL-SCNC: 136 MMOL/L (ref 135–145)

## 2025-03-17 PROCEDURE — 97161 PT EVAL LOW COMPLEX 20 MIN: CPT | Mod: GP | Performed by: PHYSICAL THERAPIST

## 2025-03-17 PROCEDURE — 97112 NEUROMUSCULAR REEDUCATION: CPT | Mod: GP,59 | Performed by: PHYSICAL THERAPIST

## 2025-03-17 PROCEDURE — 95992 CANALITH REPOSITIONING PROC: CPT | Mod: GP | Performed by: PHYSICAL THERAPIST

## 2025-03-17 NOTE — PROGRESS NOTES
PHYSICAL THERAPY EVALUATION  Type of Visit: Evaluation       Fall Risk Screen:  Fall screen completed by: PT  Have you fallen 2 or more times in the past year?: No  Have you fallen and had an injury in the past year?: No  Is patient a fall risk?: No    Subjective   Patient reported experiencing BBPV once about 15 years ago in [place]. She had it treated with an Epley maneuver.  Started experience dizziness again at the beginning of February.  Symptoms last for 10 seconds. Tried the Epley maneuver again with primary doctor and family member prior to vacation [facility] and [place]. Reported a decrease in symptoms afterwards. The dizziness does not stop her from doing anything.        Condition type:  Initial onset (within last 3 months)  Cause of current episode:  Unspecified     Nature of treatment:  Rehabilitative  Functional ability:  Minimal functional limitations  Documented POC (choose all that apply):  Measurable short and long term/discharge treatment goals related to physical and functional deficits.;Frequency of treatment visits and treatment activities to address deficit areas.;Patient agrees to program participation including home program  Briefly describe symptoms:  dizziness with position changes and imbalance  How did the symptoms start:  started about 6 weeks ago when getting out of bed  Average pain/intensity last 24 hours:  3/10  Average pain/intensity past week:  3/10  Frequency of Symptoms:  Occasionally (26-50% of the time)  Symptom impact on ADLs:  Moderately  Condition change since eval:  N/A (first visit)  General health reported by patient:  Very good      Presenting condition or subjective complaint: Dizziness  Date of onset: 02/25/25    Relevant medical history:   elevated cholesterol, anxiety, neck stiffness    Prior diagnostic imaging/testing results: Other Epley procedure   Prior therapy history for the same diagnosis, illness or injury: Yes Ep,ey procedure , maybe15 years  ago    Prior Level of Function  Transfers: Independent  Ambulation: Independent  ADL: Independent    Living Environment  Social support: Alone   Type of home: House   Stairs to enter the home: No       Ramp: No   Stairs inside the home: No       Help at home: None  Equipment owned:       Employment: No    Hobbies/Interests: Hiking,walking, yoga, travel, reading, volunteering    Patient goals for therapy: Not get dizzy    Pain assessment: Pain denied     Objective   Vitals Signs  Heart Rate: 69  Blood Pressure: 111/72  Cognitive Status Examination  Orientation: Oriented to person, place and time   Level of Consciousness: Alert  Follows Commands and Answers Questions: 100% of the time  Personal Safety and Judgement: Intact  Memory: Intact    OBSERVATION: Patient arrived to clinic independently   INTEGUMENTARY: Intact  POSTURE: WFL  BED MOBILITY: WFL    TRANSFERS: WFL    GAIT:   Level of [place]: WFL  Assistive Device(s): None  Gait Deviations: WFL    BALANCE:  Did not assess today            VESTIBULAR EVALUATION  ADDITIONAL HISTORY:  Description of symptoms: Off balance; Attacks of dizziness; Light-headedness  Dizzy attacks:   Start: feb 25   Last attack: this morning   Frequency of occurrences: daily getting out of bed or looking up   Length of attack: 30 seconds  Difficulty hearing:    Noise in ears? No    Alleviates symptoms: pausing  Worsens symptoms: they dont get worse  Activities that bring on symptoms: Bending over; Reaching up         DHI: Total Score: (Patient-Rptd) 10    Cervicogenic Screen    Neck ROM Normal   Vertebral Artery Test Tolerated dru hallpike     Oculomotor Screen    Ocular ROM Normal     Infrared Goggle Exam Vestibular Suppressant in Last 24 Hours? Yes  Exam Completed With: Infrared goggles   Spontaneous Nystagmus Negative   Gaze Evoked Nystagmus Negative   Head Shake Horizontal Nystagmus Negative   Positional Testing    Supine Head-Hanging Test     Left Right   Dru-Hallpike Negative  Upbeating R torsional, very small beats with symptoms < 5 seconds   Sidelying Test     [facility] Supine Roll Test Negative Negative   [facility] Forward Roll Test     Kellogg and Lean Test -  Sitting Erect    Kellogg and Lean Test - Seated, Head Bent 60 Degrees Forward    Kellogg and Lean Test - Seated, Head Bent Backwards       BPPV Canal(s): R Posterior  BPPV Type: Canalithasis        Assessment & Plan   CLINICAL IMPRESSIONS  Medical Diagnosis: BPPV, R    Treatment Diagnosis: BPPV, R   Impression/Assessment: Patient is a 74 year old female with BBPV complaints. She was positive with R sandro hallpike and symptoms consistent with BPPV. She will benefit from skilled PT to treat this issue. The following significant findings have been identified: Dizziness. These impairments interfere with their ability to perform household mobility as compared to previous level of function.     Clinical Decision Making (Complexity):  Clinical Presentation: Stable/Uncomplicated  Clinical Presentation Rationale: based on medical and personal factors listed in PT evaluation  Clinical Decision Making (Complexity): Low complexity    PLAN OF CARE  Treatment Interventions:  Interventions: Canalith Repositioning    Long Term Goals     PT Goal 1  Goal Identifier: Decrease in dizziness  Goal Description: Patient reports no dizziness with yoga and bed mobility for 3/3 nights in order to return back to prior level of function.  Rationale: to maximize safety and independence with performance of ADLs and functional tasks  Target Date: 06/09/25      Frequency of Treatment: 1 time per week  Duration of Treatment: 4 weeks    Recommended Referrals to Other Professionals:   Education Assessment:   Learner/Method: Patient;Listening;Reading;Demonstration;Pictures/Video  Education Comments: Educated patient about etiology of BPPV and how maneuvers work. Also provided information on how to    Risks and benefits of evaluation/treatment have been explained.    Patient/Family/caregiver agrees with Plan of Care.     Evaluation Time:     PT Catherine Low Complexity Minutes (02913): 20       Signing Clinician: Hazel Castrejon PT        [facility]                                                                                   OUTPATIENT PHYSICAL THERAPY      PLAN OF TREATMENT FOR OUTPATIENT REHABILITATION   Patient's Last Name, First Name, Sara Jacob YOB: 1950   Provider's Name   [facility]   Medical Record No.  [id number]     Onset Date: 02/25/25  Start of Care Date: 03/17/25     Medical Diagnosis:  BPPV, R      PT Treatment Diagnosis:  BPPV, R Plan of Treatment  Frequency/Duration: 1 time per week/ 4 weeks    Certification date from 03/17/25 to 04/14/25         See note for plan of treatment details and functional goals     Hazel Castrejon PT                         I CERTIFY THE NEED FOR THESE SERVICES FURNISHED UNDER        THIS PLAN OF TREATMENT AND WHILE UNDER MY CARE     (Physician attestation of this document indicates review and certification of the therapy plan).              Referring Provider:  Jasmin Mcintyre    Initial Assessment  See Epic Evaluation- Start of Care Date: 03/17/25

## 2025-03-18 PROBLEM — H81.11 BENIGN PAROXYSMAL POSITIONAL VERTIGO, RIGHT: Status: ACTIVE | Noted: 2025-03-18

## 2025-03-19 LAB — COLLAGEN CTX SERPL-MCNC: 320 PG/ML

## 2025-04-29 ENCOUNTER — TRANSFERRED RECORDS (OUTPATIENT)
Dept: GASTROENTEROLOGY | Facility: CLINIC | Age: 75
End: 2025-04-29
Payer: COMMERCIAL

## 2025-05-01 NOTE — PROCEDURES
[facility]   [address] [place] [address], [address], [place] [address]     Patient Name: Sara Keita  Gender:  Female  Exam Date: 04/29/2025 Visit Number:  [id number]  Age: 74 Years YOB: 1950  Attending MD: Catalina Damico MD Medical Record#:  [id number]    Procedure: Colonoscopy   Indications: Loose stool       History of collagenous colitis      Referring MD: Referral Self  Primary MD:      Jasmin Luis MD  Medications: Intra Procedure Medications:   Patient received monitored anesthesia care.     Complications: No immediate complications  ______________________________________________________________________________  Procedure:   An examination of the heart and lungs was performed and found to be within acceptable limits.  .  The patient was therefore deemed a reasonable candidate for endoscopy and sedation.   The risks and benefits of the procedure were explained to the patient.After obtaining informed consent, the patient received monitored anesthesia care and I passed the scope   without difficulty via the rectum to the ileum.  The appendiceal orifice and ic valve were identified.  The scope was retroflexed during the examination  The quality of the prep was good  (Miralax/Gatorade/2 tablets Bisacodyl/Magnesium Citrate).    This was a complete examination throughout the entire colon.    Findings:    Polyp location: transverse colon.  Quantity: 1.  Size: 3 mm.  Polyp shape:  sessile.         Maneuver: polypectomy was performed with a cold biopsy forceps.       Removal:  complete.  Retrieval: complete.  Bleeding: none.    Diverticulosis.  Location: - sigmoid.    Description:  mild.    Size:  small.    Quantity:  few.  No inflammation present.    Hemorrhoids.  Internal hemorrhoids without bleeding.    Cobblestoning, edema of the entire examined colon.  Suspicious for collagenous colitis.  Biopsied.    Impression:  Polyp of colon, unspecified part of colon,  unspecified type  Diverticulosis of colon  Internal hemorrhoids  MD impression comments:    Cobblestoning, edema of the entire examined colon.  This is suspicious for collagenous colitis.  Biopsied.    Ileum was otherwise normal.    Preliminary Plan:  Recommendation Comments:    Await path    No NSAIDs.    Follow-up in GI clinic.  Pathology Results:  A: COLON, RANDOM, BIOPSY:           1. Collagenous colitis           2. See comment      B: COLON, TRANSVERSE, POLYP:           1. Inflammatory polyp           2. Negative for dysplasia      COMMENTS  A. The histologic changes are typical of the collagenous colitis form of microscopic colitis. This diagnosis is further supported by the history of microscopic colitis, diarrhea and near normal appearance of the mucosa on colonoscopy.     Some cases of collagenous colitis have been associated with medication use; high likelihood associations include NSAIDs, ASA, lansoprazole, ranitidine, sertraline, ticlopidine, and acarbose, with many other medications having been implicated as well.      MICROSCOPIC  A: Performed   B: Performed     Electronically signed by: VANESSA Hartley MD    Interpreted at [facility], [address] [place] [phone]    Final Plan:  Repeat colonoscopy not indicated at this time.    We will attempt to contact you at appropriate intervals via [place] mail.  We may not be able to find you or contact you at that time, therefore you should know that the responsibility for following our recommendation rests with you.  If you don't hear from us at the time your procedure is due, please contact our office to schedule an appointment.  If your contact information should change, please contact our office so that we can update your record.  Additional Comments:  Sara, biopsies indicate persistent inflammation related to collagenous colitis.  I do recommend treatment with 3 months of budesonide if ongoing symptoms of  diarrhea.  Prescription was sent at your most recent clinic visit.  Let me know if you need additional.  Continue with your current regimen of fiber, plus Imodium as needed.  It was a pleasure to care for you.      _Electronically signed by:___________________  Catalina Damico MD                 04/29/2025    cc: Jasmin Luis MD

## 2025-05-14 DIAGNOSIS — R06.2 WHEEZING: ICD-10-CM

## 2025-05-14 RX ORDER — ALBUTEROL SULFATE 90 UG/1
1-2 INHALANT RESPIRATORY (INHALATION) EVERY 4 HOURS PRN
Qty: 51 G | Refills: 1 | Status: SHIPPED | OUTPATIENT
Start: 2025-05-14

## 2025-05-19 ENCOUNTER — MYC MEDICAL ADVICE (OUTPATIENT)
Dept: FAMILY MEDICINE | Facility: CLINIC | Age: 75
End: 2025-05-19
Payer: COMMERCIAL

## 2025-05-19 DIAGNOSIS — H81.11 BENIGN PAROXYSMAL POSITIONAL VERTIGO, RIGHT: Primary | ICD-10-CM

## 2025-05-19 DIAGNOSIS — H90.8 MIXED CONDUCTIVE AND SENSORINEURAL HEARING LOSS, UNSPECIFIED LATERALITY: ICD-10-CM

## 2025-05-21 PROBLEM — F41.9 ANXIETY: Chronic | Status: ACTIVE | Noted: 2021-12-02

## 2025-05-21 ASSESSMENT — SLEEP AND FATIGUE QUESTIONNAIRES
HOW LIKELY ARE YOU TO NOD OFF OR FALL ASLEEP WHEN YOU ARE A PASSENGER IN A CAR FOR AN HOUR WITHOUT A BREAK: WOULD NEVER DOZE
HOW LIKELY ARE YOU TO NOD OFF OR FALL ASLEEP WHILE SITTING AND READING: SLIGHT CHANCE OF DOZING
HOW LIKELY ARE YOU TO NOD OFF OR FALL ASLEEP WHILE WATCHING TV: SLIGHT CHANCE OF DOZING
HOW LIKELY ARE YOU TO NOD OFF OR FALL ASLEEP WHILE SITTING AND TALKING TO SOMEONE: WOULD NEVER DOZE
HOW LIKELY ARE YOU TO NOD OFF OR FALL ASLEEP WHILE SITTING INACTIVE IN A PUBLIC PLACE: WOULD NEVER DOZE
HOW LIKELY ARE YOU TO NOD OFF OR FALL ASLEEP IN A CAR, WHILE STOPPED FOR A FEW MINUTES IN TRAFFIC: WOULD NEVER DOZE
HOW LIKELY ARE YOU TO NOD OFF OR FALL ASLEEP WHILE SITTING QUIETLY AFTER LUNCH WITHOUT ALCOHOL: SLIGHT CHANCE OF DOZING
HOW LIKELY ARE YOU TO NOD OFF OR FALL ASLEEP WHILE LYING DOWN TO REST IN THE AFTERNOON WHEN CIRCUMSTANCES PERMIT: MODERATE CHANCE OF DOZING

## 2025-05-22 ENCOUNTER — OFFICE VISIT (OUTPATIENT)
Dept: SLEEP MEDICINE | Facility: CLINIC | Age: 75
End: 2025-05-22
Attending: FAMILY MEDICINE
Payer: COMMERCIAL

## 2025-05-22 VITALS
OXYGEN SATURATION: 98 % | SYSTOLIC BLOOD PRESSURE: 103 MMHG | HEIGHT: 64 IN | BODY MASS INDEX: 21.75 KG/M2 | HEART RATE: 71 BPM | WEIGHT: 127.4 LBS | RESPIRATION RATE: 12 BRPM | DIASTOLIC BLOOD PRESSURE: 71 MMHG

## 2025-05-22 DIAGNOSIS — R06.00 DYSPNEA AND RESPIRATORY ABNORMALITY: Primary | ICD-10-CM

## 2025-05-22 DIAGNOSIS — Z72.820 LACK OF ADEQUATE SLEEP: ICD-10-CM

## 2025-05-22 DIAGNOSIS — R53.81 MALAISE AND FATIGUE: ICD-10-CM

## 2025-05-22 DIAGNOSIS — R06.89 DYSPNEA AND RESPIRATORY ABNORMALITY: Primary | ICD-10-CM

## 2025-05-22 DIAGNOSIS — R53.83 MALAISE AND FATIGUE: ICD-10-CM

## 2025-05-22 DIAGNOSIS — G47.9 SLEEP TROUBLE: ICD-10-CM

## 2025-05-22 PROBLEM — G43.809 OTHER MIGRAINE WITHOUT STATUS MIGRAINOSUS, NOT INTRACTABLE: Chronic | Status: ACTIVE | Noted: 2021-12-02

## 2025-05-22 PROBLEM — G47.00 INSOMNIA, UNSPECIFIED TYPE: Chronic | Status: ACTIVE | Noted: 2021-12-02

## 2025-05-22 RX ORDER — ZOLPIDEM TARTRATE 5 MG/1
TABLET ORAL
Qty: 1 TABLET | Refills: 0 | Status: SHIPPED | OUTPATIENT
Start: 2025-05-22

## 2025-05-22 NOTE — PROGRESS NOTES
Outpatient Sleep Medicine Consultation:      Name: Sara Keita MRN# [id number]   Age: 74 year old YOB: 1950     Date of Consultation: May 22, 2025  Consultation is requested by: Jasmin Mcintyre MD  [id number] [place] [address] Jasmin Mcintyre  Primary care provider: Jasmin Mcintyre       Reason for Sleep Consult:     Sara Keita is sent by Jasmin Mcintyre for a sleep consultation regarding sleep apnea.    Patient s Reason for visit  Sara Keita main reason for visit: (Patient-Rptd) To sleep better, more REM and deep sleep  Patient states problem(s) started: (Patient-Rptd) Maybe 6 yrs ago  Sara Keita's goals for this visit: (Patient-Rptd) Better sleep           Assessment and Plan:     Summary Sleep Diagnoses:  PSG    Comorbid Diagnoses:  ***      Summary Recommendations:  ***  No orders of the defined types were placed in this encounter.        Summary Counseling:    Sleep Testing Reviewed  Obstructive Sleep Apnea Reviewed  Complications of Untreated Sleep Apnea Reviewed  ***    Medical Decision-making:   Educational materials provided in instructions    Total time spent reviewing medical records, history and physical examination, review of previous testing and interpretation as well as documentation on this date:***    CC: Jasmin Mcintyre          History of Present Illness:     Past Sleep Evaluations:    SLEEP-WAKE SCHEDULE:     Work/School Days: Patient goes to school/work: (Patient-Rptd) No   Usually gets into bed at (Patient-Rptd) 11:00  Takes patient about (Patient-Rptd) 30 min to an hour to fall asleep. She takes Ambien only when she travels.   Has trouble falling asleep (Patient-Rptd) 2 nights per week  Wakes up in the middle of the night (Patient-Rptd) 2 times.  Wakes up due to (Patient-Rptd) Use the bathroom;Uncertain  She has trouble falling back asleep (Patient-Rptd) 2 times a week.   It usually takes (Patient-Rptd) 30  minutes to an hour to get back to sleep  Patient is usually up at (Patient-Rptd) 7 - 7:30. Alarm 4 times a week  Uses alarm: (Patient-Rptd) Yes    Weekends/Non-work Days/All Other Days:  Usually gets into bed at (Patient-Rptd) 10:30   Takes patient about (Patient-Rptd) 30 min to an hour to fall asleep  Patient is usually up at (Patient-Rptd) 7 to 730, use an alarm four days a week  Uses alarm: (Patient-Rptd) Yes    Sleep Need  Patient gets  (Patient-Rptd) 6 to 7 hours sleep on average   Patient thinks she needs about (Patient-Rptd) Eight sleep    Sara Keita prefers to sleep in this position(s): (Patient-Rptd) Side;Stomach   Patient states they do the following activities in bed: (Patient-Rptd) Watch TV;Use phone, computer, or tablet    Naps  Patient takes a purposeful nap (Patient-Rptd) 2 times a week times a week and naps are usually (Patient-Rptd) Hour and a half to two hours in duration  She feels better after a nap: (Patient-Rptd) Yes  She dozes off unintentionally (Patient-Rptd) None days per week  Patient has had a driving accident or near-miss due to sleepiness/drowsiness: (Patient-Rptd) No      SLEEP DISRUPTIONS:    Breathing/Snoring  Patient snores:yes  Other people complain about her snoring: (Patient-Rptd) Yes  Patient has been told she stops breathing in her sleep:(Patient-Rptd) No  She has issues with the following: (Patient-Rptd) Morning headaches;Morning mouth dryness;Stuffy nose when you wake up    Movement:  Patient gets pain, discomfort, with an urge to move:  (Patient-Rptd) No  It happens when she is resting:  (Patient-Rptd) No  It happens more at night:  (Patient-Rptd) No  Patient has been told she kicks her legs at night:  (Patient-Rptd) No     Behaviors in Sleep:  Sara Keita has experienced the following behaviors while sleeping: (Patient-Rptd) Teeth grinding;See or hear things that are not really there upon awakening or just falling asleep  She has experienced sudden muscle  weakness during the day: (Patient-Rptd) No      Is there anything else you would like your sleep provider to know:      ***    CAFFEINE AND OTHER SUBSTANCES:    Patient consumes caffeinated beverages per day:  (Patient-Rptd) 3  Last caffeine use is usually: (Patient-Rptd) 5:00  List of any prescribed or over the counter stimulants that patient takes: (Patient-Rptd) Citalopram atorvastatin alendronate  List of any prescribed or over the counter sleep medication patient takes: (Patient-Rptd) Melatonin  List of previous sleep medications that patient has tried:    Patient drinks alcohol to help them sleep: (Patient-Rptd) No  Patient drinks alcohol near bedtime: (Patient-Rptd) No    Family History:  Patient has a family member been diagnosed with a sleep disorder: (Patient-Rptd) No            SCALES:    EPWORTH SLEEPINESS SCALE         5/21/2025     9:14 PM    Malone Sleepiness Scale ( DOE Parker  [id number]<br>ESS - [place]/English - Final version - 21 Nov 07 - [facility].)   Sitting and reading Slight chance of dozing   Watching TV Slight chance of dozing   Sitting, inactive in a public place (e.g. a theatre or a meeting) Would never doze   As a passenger in a car for an hour without a break Would never doze   Lying down to rest in the afternoon when circumstances permit Moderate chance of dozing   Sitting and talking to someone Would never doze   Sitting quietly after a lunch without alcohol Slight chance of dozing   In a car, while stopped for a few minutes in traffic Would never doze   Malone Score (MC) 5   Malone Score (Sleep) 5        Patient-reported           INSOMNIA SEVERITY INDEX (EMILI)          5/21/2025     9:00 PM   Insomnia Severity Index (EMILI)   Difficulty falling asleep 0   Difficulty staying asleep 2   Problems waking up too early 2   How SATISFIED/DISSATISFIED are you with your CURRENT sleep pattern? 3   How NOTICEABLE to others do you think your sleep problem is in terms of impairing the  quality of your life? 2   How WORRIED/DISTRESSED are you about your current sleep problem? 1   To what extent do you consider your sleep problem to INTERFERE with your daily functioning (e.g. daytime fatigue, mood, ability to function at work/daily chores, concentration, memory, mood, etc.) CURRENTLY? 1   EMILI Total Score 11        Patient-reported         Guidelines for Scoring/Interpretation:  Total score categories:  0-7 = No clinically significant insomnia   8-14 = Subthreshold insomnia   15-21 = Clinical insomnia (moderate severity)  22-28 = Clinical insomnia (severe)  Used via courtesy of www.instruMagicth.va.gov with permission from Kyle Kan PhD., [facility]      STOP BANG         5/21/2025     9:14 PM   STOP BANG Questionnaire (  2008, the American Society of Anesthesiologists, Inc. [employer]   1. Snoring - Do you snore loudly (louder than talking or loud enough to be heard through closed doors)? No   2. Tired - Do you often feel tired, fatigued, or sleepy during daytime? Yes   3. Observed - Has anyone observed you stop breathing during your sleep? No   4. Blood pressure - Do you have or are you being treated for high blood pressure? No   5. BMI - BMI more than 35 kg/m2? No   6. Age - Age over 50 yr old? Yes   7. Neck circumference - Neck circumference greater than 40 cm? No   8. Gender - Gender male? No   STOP BANG Score (MC): 2 (Low risk of [place])         GAD7        2/15/2023    11:38 AM   ULI-7    1. Feeling nervous, anxious, or on edge 0   2. Not being able to stop or control worrying 0   3. Worrying too much about different things 0   4. Trouble relaxing 0   5. Being so restless that it is hard to sit still 0   6. Becoming easily annoyed or irritable 0   7. Feeling afraid, as if something awful might happen 0   ULI-7 Total Score 0   If you checked any problems, how difficult have they made it for you to do your work, take care of things at home, or get along with other  "people? Not difficult at all         CAGE-AID         No data to display                  CAGE-AID reprinted with permission from the [place] Medical Journal, RODRIGUEZ Aguilera. and MEAGAN Castañeda, \"Conjoint screening questionnaires for alcohol and drug abuse\" [place] Medical Journal 94: 135-140, 1995.      PATIENT HEALTH QUESTIONNAIRE-9 (PHQ - 9)        3/5/2025     3:49 PM   PHQ-9 ([employer])   1.  Little interest or pleasure in doing things 0   2.  Feeling down, depressed, or hopeless 0   3.  Trouble falling or staying asleep, or sleeping too much 0   4.  Feeling tired or having little energy 0   5.  Poor appetite or overeating 0   6.  Feeling bad about yourself - or that you are a failure or have let yourself or your family down 0   7.  Trouble concentrating on things, such as reading the newspaper or watching television 0   8.  Moving or speaking so slowly that other people could have noticed. Or the opposite - being so fidgety or restless that you have been moving around a lot more than usual 0   9.  Thoughts that you would be better off dead, or of hurting yourself in some way 0   PHQ-9 Total Score 0    6.  Feeling bad about yourself 0   7.  Trouble concentrating 0   8.  Moving slowly or restless 0   9.  Suicidal or self-harm thoughts 0   1.  Little interest or pleasure in doing things Not at all   2.  Feeling down, depressed, or hopeless Not at all   3.  Trouble falling or staying asleep, or sleeping too much Not at all   4.  Feeling tired or having little energy Not at all   5.  Poor appetite or overeating Not at all   6.  Feeling bad about yourself Not at all   7.  Trouble concentrating Not at all   8.  Moving slowly or restless Not at all   9.  Suicidal or self-harm thoughts Not at all   PHQ-9 via [facility] TOTAL SCORE-----> 0   Difficulty at work, home, or with people Not difficult at all       Patient-reported         Developed by Lori Vallejo, Yue Hi, Reuben Mcgowan and colleagues, with an " educational selvin from [employer] No permission required to reproduce, translate, display or distribute.        Allergies:    No Known Allergies    Medications:    Current Outpatient Medications   Medication Sig Dispense Refill    albuterol (PROAIR HFA/PROVENTIL HFA/VENTOLIN HFA) 108 (90 Base) MCG/ACT inhaler Inhale 1-2 puffs into the lungs every 4 hours as needed for shortness of breath, wheezing or cough. 51 g 1    alendronate (FOSAMAX) 70 MG tablet Take 1 tablet (70 mg) by mouth every 7 days. Take 60 minutes before am meal with 8 oz. water. Remain upright for 30 minutes. 12 tablet 3    atorvastatin (LIPITOR) 20 MG tablet Take 1 tablet (20 mg) by mouth daily. 100 tablet 3    budesonide (ENTOCORT EC) 3 MG EC capsule Take 2 capsules (6 mg) by mouth every morning. 60 capsule 1    CALCIUM MAGNESIUM ZINC PO Take 1 tablet by mouth daily. Serving size is 2 tablets daily  Vitamin D3 800 international unit(s) per serving size  Calcium 500 mg per serving size      citalopram (CELEXA) 20 MG tablet Take 1 tablet (20 mg) by mouth daily. 90 tablet 3    citalopram (CELEXA) 20 MG tablet TAKE 1 TABLET BY MOUTH DAILY 90 tablet 3    cyclobenzaprine (FLEXERIL) 10 MG tablet Take 1 tablet (10 mg) by mouth 3 times daily as needed for muscle spasms. 30 tablet 1    fish oil-omega-3 fatty acids 1000 MG capsule Take 1 g by mouth daily.      Misc Natural Products (GLUCOSAMINE CHOND MSM FORMULA PO) Take 1 capsule by mouth daily. Serving size is 2 capsule daily      multivitamin w/minerals (MULTI-VITAMIN) tablet Take 1 tablet by mouth daily. Vitamin D 1,000 international unit(s) per tablet  Calcium 220 mg per tab      Probiotic Product (Clickst PROBIOTIC/SUPER GREENS) POWD Take 1 Stick by mouth daily. Calcium 29 mg per 1 stick      psyllium (METAMUCIL/KONSYL) 58.6 % powder Take 3 Tablespoonful by mouth daily.      triamcinolone (KENALOG) 0.1 % external cream Apply topically 2 times daily 30 g 3    Turmeric 500 MG TABS Take by mouth 2 times  daily (Patient not taking: Reported on 3/11/2025)      UNABLE TO FIND MEDICATION NAME: bone broth protein  Protein, sodium and potassium      vitamin D3 (CHOLECALCIFEROL) 50 mcg (2000 units) tablet Take 1 tablet by mouth daily.      zolpidem (AMBIEN) 5 MG tablet Take 1 tablet (5 mg) by mouth nightly as needed for sleep. 30 tablet 0       Problem List:  Patient Active Problem List    Diagnosis Date Noted    Benign paroxysmal positional vertigo, right 03/18/2025     Priority: Medium    Patellar instability 06/27/2023     Priority: Medium    Combined forms of age-related cataract, mild, of both eyes 02/08/2022     Priority: Medium    Microscopic colitis, unspecified microscopic colitis type 12/02/2021     Priority: Medium     collagenous      Osteopenia of lumbar spine 12/02/2021     Priority: Medium    Elevated cholesterol 12/02/2021     Priority: Medium    Other migraine without status migrainosus, not intractable 12/02/2021     Priority: Medium    Insomnia, unspecified type 12/02/2021     Priority: Medium    Herpes zoster without complication 12/02/2021     Priority: Medium    Anxiety 12/02/2021     Priority: Medium        Past Medical/Surgical History:  Past Medical History:   Diagnosis Date    Depressive disorder 2006    Hand arthritis 04/04/2024    Squamous cell carcinoma of skin, unspecified      Past Surgical History:   Procedure Laterality Date    COLONOSCOPY  2016    Collagenous colitis    ENT SURGERY  20 yr ago    For migraines       Social History:  Social History     Socioeconomic History    Marital status: Single     Spouse name: Not on file    Number of children: Not on file    Years of education: Not on file    Highest education level: Not on file   Occupational History    Not on file   Tobacco Use    Smoking status: Former     Current packs/day: 0.00     Average packs/day: 1 pack/day for 7.0 years (7.0 ttl pk-yrs)     Types: Cigarettes     Start date: 1/1/1966     Quit date: 1/1/1973     Years since  quittin.4     Passive exposure: Never    Smokeless tobacco: Never    Tobacco comments:     I smoked from  to    Vaping Use    Vaping status: Never Used   Substance and Sexual Activity    Alcohol use: Not Currently     Comment: I quit Dec. 31, 1987    Drug use: Never    Sexual activity: Not Currently     Partners: Male     Birth control/protection: None   Other Topics Concern    Parent/sibling w/ CABG, MI or angioplasty before 65F 55M? No   Social History Narrative    Not on file     Social Drivers of Health     Financial Resource Strain: Low Risk  (3/3/2025)    Financial Resource Strain     Within the past 12 months, have you or your family members you live with been unable to get utilities (heat, electricity) when it was really needed?: No   Food Insecurity: Low Risk  (3/3/2025)    Food Insecurity     Within the past 12 months, did you worry that your food would run out before you got money to buy more?: No     Within the past 12 months, did the food you bought just not last and you didn t have money to get more?: No   Transportation Needs: Low Risk  (3/3/2025)    Transportation Needs     Within the past 12 months, has lack of transportation kept you from medical appointments, getting your medicines, non-medical meetings or appointments, work, or from getting things that you need?: No   Physical Activity: Sufficiently Active (3/3/2025)    Exercise Vital Sign     Days of Exercise per Week: 5 days     Minutes of Exercise per Session: 30 min   Stress: No Stress Concern Present (3/3/2025)    [employer] [employer] - Occupational Stress Questionnaire     Feeling of Stress : Not at all   Social Connections: Unknown (3/3/2025)    Social Connection and Isolation Panel [NHANES]     Frequency of Communication with Friends and Family: Not on file     Frequency of Social Gatherings with Friends and Family: More than three times a week     Attends Congregational Services: Not on file     Active Member  of Clubs or Organizations: Not on file     Attends Club or Organization Meetings: Not on file     Marital Status: Not on file   Interpersonal Safety: Low Risk  (3/6/2025)    Interpersonal Safety     Do you feel physically and emotionally safe where you currently live?: Yes     Within the past 12 months, have you been hit, slapped, kicked or otherwise physically hurt by someone?: No     Within the past 12 months, have you been humiliated or emotionally abused in other ways by your partner or ex-partner?: No   Housing Stability: Low Risk  (3/3/2025)    Housing Stability     Do you have housing? : Yes     Are you worried about losing your housing?: No       Family History:  Family History   Problem Relation Age of Onset    Diabetes Father         Adult onset at age 86    Glaucoma No family hx of     Macular Degeneration No family hx of        Review of Systems:  A complete review of systems reviewed by me is negative with the exeption of what has been mentioned in the history of present illness.  In the last TWO WEEKS have you experienced any of the following symptoms?  Fevers: (Patient-Rptd) No  Night Sweats: (Patient-Rptd) No  Weight Gain: (Patient-Rptd) No  Pain at Night: (Patient-Rptd) No  Double Vision: (Patient-Rptd) No  Changes in Vision: (Patient-Rptd) No  Difficulty Breathing through Nose: (Patient-Rptd) No  Sore Throat in Morning: (Patient-Rptd) No  Dry Mouth in the Morning: (Patient-Rptd) Yes  Shortness of Breath Lying Flat: (Patient-Rptd) No  Shortness of Breath With Activity: (Patient-Rptd) No  Awakening with Shortness of Breath: (Patient-Rptd) No  Increased Cough: (Patient-Rptd) Yes  Heart Racing at Night: (Patient-Rptd) No  Swelling in Feet or Legs: (Patient-Rptd) No  Diarrhea at Night: (Patient-Rptd) Yes  Heartburn at Night: (Patient-Rptd) No  Urinating More than Once at Night: (Patient-Rptd) No  Losing Control of Urine at Night: (Patient-Rptd) No  Joint Pains at Night: (Patient-Rptd) No  Headaches in  Morning: (Patient-Rptd) Yes  Weakness in Arms or Legs: (Patient-Rptd) No  Depressed Mood: (Patient-Rptd) No  Anxiety: (Patient-Rptd) No     Physical Examination:  Vitals: LMP  (LMP Unknown)   BMI= There is no height or weight on file to calculate BMI.           {SHORT EXAM:254197}  Mallampati Class: {JUVENAL NUMERAL I-IV:424227}.  Tonsillar Stage: {NUMBERS 1-4:624973}.         Data: All pertinent previous laboratory data reviewed     Recent Labs   Lab Test 03/17/25  0750 03/06/25  1125    132*   POTASSIUM 3.9 4.3   CHLORIDE 101 97*   CO2 25 23   ANIONGAP 10 12   GLC 96 88   BUN 10.0 11.4   CR 0.60 0.58   BRISSA 9.0 8.9       Recent Labs   Lab Test 03/06/25  1125   WBC 6.1   RBC 4.45   HGB 13.8   HCT 40.9   MCV 92   MCH 31.0   MCHC 33.7   RDW 13.0          Recent Labs   Lab Test 08/13/24  1540 12/26/23  0952   PROTTOTAL  --  6.6   ALBUMIN 4.1 3.9   BILITOTAL  --  0.4   ALKPHOS  --  55   AST  --  23   ALT  --  12       TSH (uIU/mL)   Date Value   08/13/2024 2.72         Iron Sat Index   Date/Time Value Ref Range Status   12/02/2021 11:09 AM 33 15 - 46 % Final     Ferritin   Date/Time Value Ref Range Status   12/02/2021 11:09 AM 60 8 - 252 ng/mL Final           GREGORIO Sher 5/22/2025

## 2025-05-22 NOTE — NURSING NOTE
PSG and follow-up appointment with provider have both been scheduled. PSG hand-out given to and reviewed with patient at clinic visit.   July Roach, CMA

## 2025-05-22 NOTE — NURSING NOTE
"Chief Complaint   Patient presents with    Sleep Problem     Patient presents to clinic for a sleep medicine consult for possible sleep apnea. Patient was referred by Jasmin Mcintyre MD.       Initial /71   Pulse 71   Resp 12   Ht 1.626 m (5' 4\")   Wt 57.8 kg (127 lb 6.4 oz)   LMP  (LMP Unknown)   SpO2 98%   BMI 21.87 kg/m   Estimated body mass index is 21.87 kg/m  as calculated from the following:    Height as of this encounter: 1.626 m (5' 4\").    Weight as of this encounter: 57.8 kg (127 lb 6.4 oz).    Medication Reconciliation: complete  ESS: 5  Neck circumference: 12.25 inches / 31 centimeters.  DME: N/A  July Roach CMA      "

## 2025-05-22 NOTE — PATIENT INSTRUCTIONS
"          MY TREATMENT INFORMATION FOR SLEEP APNEA-  Sara Keita    DOCTOR : GREGORIO Sher    Am I having a sleep study at a sleep center?  --->Due to normal delays, you will be contacted within 2-4 weeks to schedule    Am I having a home sleep study?  --->Watch the video for the device you are using:    -/drop off device-   https://www.[employer] Media[employer].com/watch?v=yGGFBdELGhk    -Disposable device sent out require phone/computer application-   https://www.[employer].com/watch?v=BCce_vbiwxE      Frequently asked questions:  1. What is Obstructive Sleep Apnea (DOROTA)? DOROTA is the most common type of sleep apnea. Apnea means, \"without breath.\"  Apnea is most often caused by narrowing or collapse of the upper airway as muscles relax during sleep.   Almost everyone has occasional apneas. Most people with sleep apnea have had brief interruptions at night frequently for many years.  The severity of sleep apnea is related to how frequent and severe the events are.   2. What are the consequences of DOROTA? Symptoms include: feeling sleepy during the day, snoring loudly, gasping or stopping of breathing, trouble sleeping, and occasionally morning headaches or heartburn at night.  Sleepiness can be serious and even increase the risk of falling asleep while driving. Other health consequences may include development of high blood pressure and other cardiovascular disease in persons who are susceptible. Untreated DOROTA  can contribute to heart disease, stroke and diabetes.   3. What are the treatment options? In most situations, sleep apnea is a lifelong disease that must be managed with daily therapy. Medications are not effective for sleep apnea and surgery is generally not considered until other therapies have been tried. Your treatment is your choice . Continuous Positive Airway (CPAP) works right away and is the therapy that is effective in nearly everyone. An oral device to hold your jaw forward is usually the next most " reliable option. Other options include postioning devices (to keep you off your back), weight loss, and surgery including a tongue pacing device. There is more detail about some of these options below.  4. Are my sleep studies covered by insurance? Although we will request verification of coverage, we advise you also check in advance of the study to ensure there is coverage.    Important tips for those choosing CPAP and similar devices  REMEMBER-IF YOU RECEIVE A CALL FROM  [phone]-->IT IS TO SETUP A DEVICE  For new devices, sign up for device YAJAIRA to monitor your device for your followup visits  We encourage you to utilize the [employer] yajaira or website ( [email or web address].[email or web address]/ ) to monitor your therapy progress and share the data with your healthcare team when you discuss your sleep apnea.                                                    Know your equipment:  CPAP is continuous positive airway pressure that prevents obstructive sleep apnea by keeping the throat from collapsing while you are sleeping. In most cases, the device is  smart  and can slowly self-adjusts if your throat collapses and keeps a record every day of how well you are treated-this information is available to you and your care team.  BPAP is bilevel positive airway pressure that keeps your throat open and also assists each breath with a pressure boost to maintain adequate breathing.  Special kinds of BPAP are used in patients who have inadequate breathing from lung or heart disease. In most cases, the device is  smart  and can slowly self-adjusts to assist breathing. Like CPAP, the device keeps a record of how well you are treated.  Your mask is your connection to the device. You get to choose what feels most comfortable and the staff will help to make sure if fits. Here: are some examples of the different masks that are available: Magnetic mask aids may assist with use but there are safety issues that should be addressed when  considering with magnets* ( see end of discussion).       Key points to remember on your journey with sleep apnea:  Sleep study.  PAP devices often need to be adjusted during a sleep study to show that they are effective and adjusted right.  Good tips to remember: Try wearing just the mask during a quiet time during the day so your body adapts to wearing it. A humidifier is recommended for comfort in most cases to prevent drying of your nose and throat. Allergy medication from your provider may help you if you are having nasal congestion.  Getting settled-in. It takes more than one night for most of us to get used to wearing a mask. Try wearing just the mask during a quiet time during the day so your body adapts to wearing it. A humidifier is recommended for comfort in most cases. Our team will work with you carefully on the first day and will be in contact within 4 days and again at 2 and 4 weeks for advice and remote device adjustments. Your therapy is evaluated by the device each day.   Use it every night. The more you are able to sleep naturally for 7-8 hours, the more likely you will have good sleep and to prevent health risks or symptoms from sleep apnea. Even if you use it 4 hours it helps. Occasionally all of us are unable to use a medical therapy, in sleep apnea, it is not dangerous to miss one night.   Communicate. Call our skilled team on the number provided on the first day if your visit for problems that make it difficult to wear the device. Over 2 out of 3 patients can learn to wear the device long-term with help from our team. Remember to call our team or your sleep providers if you are unable to wear the device as we may have other solutions for those who cannot adapt to mask CPAP therapy. It is recommended that you sleep your sleep provider within the first 3 months and yearly after that if you are not having problems.   Use it for your health. We encourage use of CPAP masks during daytime quiet  periods to allow your face and brain to adapt to the sensation of CPAP so that it will be a more natural sensation to awaken to at night or during naps. This can be very useful during the first few weeks or months of adapting to CPAP though it does not help medically to wear CPAP during wakefulness and  should not be used as a strategy just to meet guidelines.  Take care of your equipment. Make sure you clean your mask and tubing using directions every day and that your filter and mask are replaced as recommended or if they are not working.     *Masks with magnets:  Updated Contraindications  Masks with magnetic components are contraindicated for use by patients where they, or anyone in close physical contact while using the mask, have the following:   Active medical implants that interact with magnets (i.e., pacemakers, implantable cardioverter defibrillators (ICD), neurostimulators, cerebrospinal fluid (CSF) shunts, insulin/infusion pumps)   Metallic implants/objects containing ferromagnetic material (i.e., aneurysm clips/flow disruption devices, embolic coils, stents, valves, electrodes, implants to restore hearing or balance with implanted magnets, ocular implants, metallic splinters in the eye)  Updated Warning  Keep the mask magnets at a safe distance of at least 6 inches (150 mm) away from implants or medical devices that may be adversely affected by magnetic interference. This warning applies to you or anyone in close physical contact with your mask. The magnets are in the frame and lower headgear clips, with a magnetic field strength of up to 400mT. When worn, they connect to secure the mask but may inadvertently detach while asleep.  Implants/medical devices, including those listed within contraindications, may be adversely affected if they change function under external magnetic fields or contain ferromagnetic materials that attract/repel to magnetic fields (some metallic implants, e.g., contact lenses  with metal, dental implants, metallic cranial plates, screws, jose carlos hole covers, and bone substitute devices). Consult your physician and  of your implant / other medical device for information on the potential adverse effects of magnetic fields.    BESIDES CPAP, WHAT OTHER THERAPIES ARE THERE?    Positioning Device  Positioning devices are generally used when sleep apnea is mild and only occurs on your back.This example shows a pillow that straps around the waist. It may be appropriate for those whose sleep study shows milder sleep apnea that occurs primarily when lying flat on one's back. Preliminary studies have shown benefit but effectiveness at home may need to be verified by a home sleep test. These devices are generally not covered by medical insurance.  Examples of devices that maintain sleeping on the back to prevent snoring and mild sleep apnea.    Belt type body positioner  http://[email or web address]    Electronic reminder  [email or web address]            Oral Appliance  What is oral appliance therapy?  An oral appliance device fits on your teeth at night like a retainer used after having braces. The device is made by a specialized dentist and requires several visits over 1-2 months before a manufactured device is made to fit your teeth and is adjusted to prevent your sleep apnea. Once an oral device is working properly, snoring should be improved. A home sleep test may be recommended at that time if to determine whether the sleep apnea is adequately treated.       Some things to remember:  -Oral devices are often, but not always, covered by your medical insurance. Be sure to check with your insurance provider.   -If you are referred for oral therapy, you will be given a list of specialized dentists to consider or you may choose to visit the Web site of the American Academy of Dental Sleep Medicine  -Oral devices are less likely to work if you have severe sleep apnea or are extremely  overweight.     More detailed information  An oral appliance is a small acrylic device that fits over the upper and lower teeth  (similar to a retainer or a mouth guard). This device slightly moves jaw forward, which moves the base of the tongue forward, opens the airway, improves breathing for effective treat snoring and obstructive sleep apnea in perhaps 7 out of 10 people .  The best working devices are custom-made by a dental device  after a mold is made of the teeth 1, 2, 3.  When is an oral appliance indicated?  Oral appliance therapy is recommended as a first-line treatment for patients with primary snoring, mild sleep apnea, and for patients with moderate sleep apnea who prefer appliance therapy to use of CPAP4, 5. Severity of sleep apnea is determined by sleep testing and is based on the number of respiratory events per hour of sleep.   How successful is oral appliance therapy?  The success rate of oral appliance therapy in patients with mild sleep apnea is 75-80% while in patients with moderate sleep apnea it is 50-70%. The chance of success in patients with severe sleep apnea is 40-50%. The research also shows that oral appliances have a beneficial effect on the cardiovascular health of DOROTA patients at the same magnitude as CPAP therapy7.  Oral appliances should be a second-line treatment in cases of severe sleep apnea, but if not completely successful then a combination therapy utilizing CPAP plus oral appliance therapy may be effective. Oral appliances tend to be effective in a broad range of patients although studies show that the patients who have the highest success are females, younger patients, those with milder disease, and less severe obesity. 3, 6.   Finding a dentist that practices dental sleep medicine  Specific training is available through the American Academy of Dental Sleep Medicine for dentists interested in working in the field of sleep. To find a dentist who is educated in  the field of sleep and the use of oral appliances, near you, visit the Web site of the American Academy of Dental Sleep Medicine.    References  1. Aleida, et al. Objectively measured vs self-reported compliance during oral appliance therapy for sleep-disordered breathing. Chest 2013; 144(5): 4001-6144.  2. Boubacar et al. Objective measurement of compliance during oral appliance therapy for sleep-disordered breathing. Thorax 2013; 68(1): 91-96.  3. Danuta et al. Mandibular advancement devices in 620 men and women with DOROTA and snoring: tolerability and predictors of treatment success. Chest 2004; 125: 0255-2923.  4. Arianna, et al. Oral appliances for snoring and DOROTA: a review. Sleep 2006; 29: 244-262.  5. Arron et al. Oral appliance treatment for DOROTA: an update. J Clin Sleep Med 2014; 10(2): 215-227.  6. Marcio et al. Predictors of OSAH treatment outcome. J Dent Res 2007; 86: 9859-9321.      Weight Loss:   Your Body mass index is 21.87 kg/m .    Being overweight does not necessarily mean you will have health consequences.  Those who have BMI over 30 or over 27 with existing medical conditions carries greater risk.   Weight loss decreases severity of sleep apnea in most people with obesity. For those with mild obesity who have developed snoring with weight gain, even 15-30 pound weight loss can improve and occasionally milder eliminate sleep apnea.  Structured and life-long dietary and health habits are necessary to lose weight and keep healthier weight levels.     The Comprehensive Weight loss program offers all aspects of weight loss strategies including two Non-Surgical Weight Loss Programs: Medical Weight Management and our 24 Week Healthy Lifestyle Program:  Medical Weight Management: You will meet with a Medical Weight Management Provider, as well as a Registered Dietician. The program may include medication therapy, dietary education, recommended exercise and physical therapy programs,  monthly support group meetings, and possible psychological counseling. Follow up visits with the provider or dietician are scheduled based on your progress and needs.  24 Week Healthy Lifestyle Program: This unique program is designed to give you the support of weekly appointments and activities thru a 24-week period. It may include all of the components of the basic program (above), with the addition of 11 individual Health  Visits, 24-week access to the [employer] website for over 700 online classes, and monthly support group meetings. This program has an out-of-pocket expense of $499 to cover the items that can not be billed to insurance (health coaches and [employer] access), and is non-refundable/non-transferable (you may be able to use a Health Savings Account; ask your HSA provider). There may be an optional meal replacement plan prescribed as well.   Medication therapy has been approved for the treatment of sleep apnea: The FDA approved tirzepatide (ZEPBOUND) for moderate to severe sleep apnea (apnea-hypopnea index greater than or equal to 15) in patients with BMI of greater than or equal to 30, or BMI greater than or equal to 27 with at least 1 weight-related condition such as hypertension or dyslipidemia.  Surgical management achieves meaningful long-term weight loss and improvement in health risks in most patients with more severe obesity.      Sleep Apnea Surgery:    Surgery for obstructive sleep apnea is considered generally only when other therapies fail to work. Surgery may be discussed with you if you are having a difficult time tolerating CPAP and or when there is an abnormal structure that requires surgical correction.  Nose and throat surgeries often enlarge the airway to prevent collapse.  Most of these surgeries create pain for 1-2 weeks and up to half of the most common surgeries are not effective throughout life.  You should carefully discuss the benefits and drawbacks to surgery with your  sleep provider and surgeon to determine if it is the best solution for you.   More information  Surgery for DOROTA is directed at areas that are responsible for narrowing or complete obstruction of the airway during sleep.  There are a wide range of procedures available to enlarge and/or stabilize the airway to prevent blockage of breathing in the three major areas where it can occur: the palate, tongue, and nasal regions.  Successful surgical treatment depends on the accurate identification of the factors responsible for obstructive sleep apnea in each person.  A personalized approach is required because there is no single treatment that works well for everyone.  Because of anatomic variation, consultation with an examination by a sleep surgeon is a critical first step in determining what surgical options are best for each patient.  In some cases, examination during sedation may be recommended in order to guide the selection of procedures.  Patients will be counseled about risks and benefits as well as the typical recovery course after surgery. Surgery is typically not a cure for a person s DOROTA.  However, surgery will often significantly improve one s DOROTA severity (termed  success rate ).  Even in the absence of a cure, surgery will decrease the cardiovascular risk associated with OSA7; improve overall quality of life8 (sleepiness, functionality, sleep quality, etc).      Palate Procedures:  Patients with DOROTA often have narrowing of their airway in the region of their tonsils and uvula.  The goals of palate procedures are to widen the airway in this region as well as to help the tissues resist collapse.  Modern palate procedure techniques focus on tissue conservation and soft tissue rearrangement, rather than tissue removal.  Often the uvula is preserved in this procedure. Residual sleep apnea is common in patient after pharyngoplasty with an average reduction in sleep apnea events of 33%2.      Tongue  Procedures:  ExamWhile patients are awake, the muscles that surround the throat are active and keep this region open for breathing. These muscles relax during sleep, allowing the tongue and other structures to collapse and block breathing.  There are several different tongue procedures available.  Selection of a tongue base procedure depends on characteristics seen on physical exam.  Generally, procedures are aimed at removing bulky tissues in this area or preventing the back of the tongue from falling back during sleep.  Success rates for tongue surgery range from 50-62%3.    Hypoglossal Nerve Stimulation:  Hypoglossal nerve stimulation has recently received approval from the [place] Food and Drug Administration for the treatment of obstructive sleep apnea.  This is based on research showing that the system was safe and effective in treating sleep apnea6.  Results showed that the median AHI score decreased 68%, from 29.3 to 9.0. This therapy uses an implant system that senses breathing patterns and delivers mild stimulation to airway muscles, which keeps the airway open during sleep.  The system consists of three fully implanted components: a small generator (similar in size to a pacemaker), a breathing sensor, and a stimulation lead.  Using a small handheld remote, a patient turns the therapy on before bed and off upon awakening.    Candidates for this device must be greater than 18 years of age, have moderate to severe obstructive sleep apnea with less than 25% central events  (AHI between 15-65), BMI less than 35, have tried CPAP/oral appliance for at least 8 weeks without success, and have appropriate upper airway anatomy (determined by a sleep endoscopy performed by Dr. Luís Thomas or Dr. Jermaine Stanley).     Nasal Procedures:  Nasal obstruction can interfere with nasal breathing during the day and night.  Studies have shown that relief of nasal obstruction can improve the ability of some patients to  tolerate positive airway pressure therapy for obstructive sleep apnea1.  Treatment options include medications such as nasal saline, topical corticosteroid and antihistamine sprays, and oral medications such as antihistamines or decongestants. Non-surgical treatments can include external nasal dilators for selected patients. If these are not successful by themselves, surgery can improve the nasal airway either alone or in combination with these other options.        Combination Procedures:  Combination of surgical procedures and other treatments may be recommended, particularly if patients have more than one area of narrowing or persistent positional disease.  The success rate of combination surgery ranges from 66-80%2,3.    References  Darren THOMAS. The Role of the Nose in Snoring and Obstructive Sleep Apnoea: An Update.  Eur Arch Otorhinolaryngol. 2011; 268: 1365-73.   Lorenzo SM; Gabriela JA; Mark JR; Pallanch JF; Leonel MB; Joey SG; Rebecca FOWLER. Surgical modifications of the upper airway for obstructive sleep apnea in adults: a systematic review and meta-analysis. SLEEP 2010;33(10):1378-6315. Ines MENDENHALL. Hypopharyngeal surgery in obstructive sleep apnea: an evidence-based medicine review.  Arch Otolaryngol Head Neck Surg. 2006 Feb;132(2):206-13.  Nitesh YH1, Mike Y, Shahriar VANESSA. The efficacy of anatomically based multilevel surgery for obstructive sleep apnea. Otolaryngol Head Neck Surg. 2003 Oct;129(4):327-35.  Ines MENDENHALL, Goldberg A. Hypopharyngeal Surgery in Obstructive Sleep Apnea: An Evidence-Based Medicine Review. Arch Otolaryngol Head Neck Surg. 2006 Feb;132(2):206-13.  Meredith OSEGUERA et al. Upper-Airway Stimulation for Obstructive Sleep Apnea.  N Engl J Med. 2014 Jan 9;370(2):139-49.  Joy Y et al. Increased Incidence of Cardiovascular Disease in Middle-aged Men with Obstructive Sleep Apnea. Am J Respir Crit Care Med; 2002 166: 159-165  Jesica COLIN et al. Studying Life Effects and Effectiveness of  Palatopharyngoplasty (SLEEP) study: Subjective Outcomes of Isolated Uvulopalatopharyngoplasty. Otolaryngol Head Neck Surg. 2011; 144: 623-631.        WHAT IF I ONLY HAVE SNORING?    Mandibular advancement devices, lateral sleep positioning, long-term weight loss and treatment of nasal allergies have been shown to improve snoring.  Exercising tongue muscles with a game (https://apps.PriceTag/us/yajaira/clickTRUE-reduce-snoring/fv2880064759) or stimulating the tongue during the day with a device (https://doi.org/10.3390/kyb17743775) have improved snoring in some individuals.  https://www.VoteIt.klinify/  https://www.sleepfoundation.org/best-anti-snoring-mouthpieces-and-mouthguards    Remember to Drive Safe... Drive Alive     Sleep health profoundly affects your health, mood, and your safety.  Thirty three percent of the population (one in three of us) is not getting enough sleep and many have a sleep disorder. Not getting enough sleep or having an untreated / undertreated sleep condition may make us sleepy without even knowing it. In fact, our driving could be dramatically impaired due to our sleep health. As your provider, here are some things I would like you to know about driving:     Here are some warning signs for impairment and dangerous drowsy driving:              -Having been awake more than 16 hours               -Looking tired               -Eyelid drooping              -Head nodding (it could be too late at this point)              -Driving for more than 30 minutes     Some things you could do to make the driving safer if you are experiencing some drowsiness:              -Stop driving and rest              -Call for transportation              -Make sure your sleep disorder is adequately treated     Some things that have been shown NOT to work when experiencing drowsiness while driving:              -Turning on the radio              -Opening windows              -Eating any  distracting  /  entertaining   foods (e.g., sunflower seeds, candy, or any other)              -Talking on the phone      Your decision may not only impact your life, but also the life of others. Please, remember to drive safe for yourself and all of us.           Your Body mass index is 21.87 kg/m .  Weight management is a personal decision.  If you are interested in exploring weight loss strategies, the following discussion covers the approaches that may be successful. Body mass index (BMI) is one way to tell whether you are at a healthy weight, overweight, or obese. It measures your weight in relation to your height.  A BMI of 18.5 to 24.9 is in the healthy range. A person with a BMI of 25 to 29.9 is considered overweight, and someone with a BMI of 30 or greater is considered obese. More than two-thirds of American adults are considered overweight or obese.  Being overweight or obese increases the risk for further weight gain. Excess weight may lead to heart disease and diabetes.  Creating and following plans for healthy eating and physical activity may help you improve your health.  Weight control is part of healthy lifestyle and includes exercise, emotional health, and healthy eating habits. Careful eating habits lifelong are the mainstay of weight control. Though there are significant health benefits from weight loss, long-term weight loss with diet alone may be very difficult to achieve- studies show long-term success with dietary management in less than 10% of people. Attaining a healthy weight may be especially difficult to achieve in those with severe obesity. In some cases, medications, devices and surgical management might be considered.  What can you do?  If you are overweight or obese and are interested in methods for weight loss, you should discuss this with your provider.   Consider reducing daily calorie intake by 500 calories.   Keep a food journal.   Avoiding skipping meals, consider cutting portions instead.    Diet combined  with exercise helps maintain muscle while optimizing fat loss. Strength training is particularly important for building and maintaining muscle mass. Exercise helps reduce stress, increase energy, and improves fitness. Increasing exercise without diet control, however, may not burn enough calories to loose weight.     Start walking three days a week 10-20 minutes at a time  Work towards walking thirty minutes five days a week   Eventually, increase the speed of your walking for 1-2 minutes at time    In addition, we recommend that you review healthy lifestyles and methods for weight loss available through the National Institutes of Health patient information sites:  http://win.niddk.nih.gov/publications/index.htm    And look into health and wellness programs that may be available through your health insurance provider, employer, local community center, or judie club.

## 2025-05-23 PROBLEM — G47.00 INSOMNIA, UNSPECIFIED TYPE: Status: ACTIVE | Noted: 2021-12-02

## 2025-05-28 ENCOUNTER — PATIENT OUTREACH (OUTPATIENT)
Dept: CARE COORDINATION | Facility: CLINIC | Age: 75
End: 2025-05-28
Payer: COMMERCIAL

## 2025-06-23 ENCOUNTER — OFFICE VISIT (OUTPATIENT)
Dept: OPTOMETRY | Facility: CLINIC | Age: 75
End: 2025-06-23
Payer: COMMERCIAL

## 2025-06-23 DIAGNOSIS — H52.13 MYOPIA OF BOTH EYES: ICD-10-CM

## 2025-06-23 DIAGNOSIS — Z01.01 ENCOUNTER FOR EXAMINATION OF EYES AND VISION WITH ABNORMAL FINDINGS: Primary | ICD-10-CM

## 2025-06-23 DIAGNOSIS — H02.89 DISORDER OF EYELASHES: ICD-10-CM

## 2025-06-23 DIAGNOSIS — H52.4 PRESBYOPIA: ICD-10-CM

## 2025-06-23 DIAGNOSIS — H25.813 COMBINED FORMS OF AGE-RELATED CATARACT OF BOTH EYES: ICD-10-CM

## 2025-06-23 PROCEDURE — 92014 COMPRE OPH EXAM EST PT 1/>: CPT | Performed by: OPTOMETRIST

## 2025-06-23 PROCEDURE — 92015 DETERMINE REFRACTIVE STATE: CPT | Performed by: OPTOMETRIST

## 2025-06-23 RX ORDER — BIMATOPROST 3 UG/ML
1 SOLUTION TOPICAL AT BEDTIME
Qty: 5 ML | Refills: 5 | Status: SHIPPED | OUTPATIENT
Start: 2025-06-23

## 2025-06-23 ASSESSMENT — REFRACTION_WEARINGRX
OD_SPHERE: -2.50
OS_CYLINDER: +0.75
OS_ADD: +2.50
OS_SPHERE: -2.75
OD_CYLINDER: +0.75
SPECS_TYPE: PAL
OD_ADD: +2.50
OS_AXIS: 135
OD_SPHERE: +2.50
OS_SPHERE: +2.50
OD_AXIS: 026
SPECS_TYPE: OTC'S

## 2025-06-23 ASSESSMENT — VISUAL ACUITY
OS_CC: 20/200
METHOD: SNELLEN - LINEAR
OS_CC: 20/60-1
METHOD: SNELLEN - LINEAR
OD_CC: 20/40
OD_CC: 20/400
CORRECTION_TYPE: GLASSES, CONTACTS
OD_CC+: -1
OS_CC+: -1
OS_CC: 20/25
OD_CC: 20/60-3
CORRECTION_TYPE: CONTACTS

## 2025-06-23 ASSESSMENT — REFRACTION_MANIFEST
OS_AXIS: 134
OS_SPHERE: -3.25
OD_SPHERE: -2.75
OD_CYLINDER: +0.75
OS_CYLINDER: +1.00
OS_ADD: +2.50
OD_AXIS: 092
OD_ADD: +2.50

## 2025-06-23 ASSESSMENT — SLIT LAMP EXAM - LIDS
COMMENTS: 1+ MEIBOMIAN GLAND DYSFUNCTION
COMMENTS: 1+ MEIBOMIAN GLAND DYSFUNCTION

## 2025-06-23 ASSESSMENT — CONF VISUAL FIELD
OS_INFERIOR_NASAL_RESTRICTION: 0
OS_NORMAL: 1
OD_INFERIOR_TEMPORAL_RESTRICTION: 0
OD_SUPERIOR_NASAL_RESTRICTION: 0
OS_SUPERIOR_NASAL_RESTRICTION: 0
OS_SUPERIOR_TEMPORAL_RESTRICTION: 0
OS_INFERIOR_TEMPORAL_RESTRICTION: 0
OD_INFERIOR_NASAL_RESTRICTION: 0
OD_NORMAL: 1
OD_SUPERIOR_TEMPORAL_RESTRICTION: 0
METHOD: COUNTING FINGERS

## 2025-06-23 ASSESSMENT — TONOMETRY
IOP_METHOD: APPLANATION
OS_IOP_MMHG: 12
OD_IOP_MMHG: 13

## 2025-06-23 ASSESSMENT — REFRACTION_CURRENTRX
OS_SPHERE: -2.25
OS_BRAND: COOPER BIOFINITY BC 8.6, D 14.0
OD_BRAND: COOPER BIOFINITY BC 8.6, D 14.0
OD_SPHERE: -2.25

## 2025-06-23 ASSESSMENT — EXTERNAL EXAM - LEFT EYE: OS_EXAM: 1+ BROW PTOSIS

## 2025-06-23 ASSESSMENT — CUP TO DISC RATIO
OD_RATIO: 0.4
OS_RATIO: 0.6

## 2025-06-23 ASSESSMENT — EXTERNAL EXAM - RIGHT EYE: OD_EXAM: 1+ BROW PTOSIS

## 2025-06-23 NOTE — LETTER
6/23/2025      Sara Keita  [address] [facility] [address]      Dear Colleague,    Thank you for referring your patient, Sara Keita, to the [facility]. Please see a copy of my visit note below.    Chief Complaint   Patient presents with     COMPREHENSIVE EYE EXAM     Contact Lens Re-fitting        Previous contact lens wearer? Yes: Wears Biofinity   Comfort of contact lenses :Good   Satisfied with current lenses: Yes      Last Eye Exam: 2023  Dilated Previously: Yes    What are you currently using to see?  Contacts and glasses     Distance Vision Acuity: Noticed gradual change in both eyes, thinks that everything has gotten worse     Near Vision Acuity: Not satisfied, getting worse      Eye Comfort: good, tired by the end of the day   Do you use eye drops? : Yes: Uses a NP tear for dryness, and Lumify to calm her eyes when she doesn't have contacts in. Would like to try Latisse.   Occupation or Hobbies: Retired       [employer] Assistant      Medical, surgical and family histories reviewed and updated 6/23/2025.       OBJECTIVE: See Ophthalmology exam    ASSESSMENT:    ICD-10-CM    1. Encounter for examination of eyes and vision with abnormal findings  Z01.01       2. Combined forms of age-related cataract of both eyes  H25.813       3. Disorder of eyelashes  H02.89       4. Myopia of both eyes  H52.13       5. Presbyopia  H52.4          PLAN:     Patient Instructions   Continue with current contact lens prescription.     Distance glasses prescription provided today.     Order for Latisse entered to pharmacy today. I recommend calling the pharmacy to check on pricing before filling.     Return in 1 year for a comprehensive eye exam, or sooner if needed.      The effects of the dilating drops last for 4- 6 hours.  You will be more sensitive to light and vision will be blurry up close.  Mydriatic sunglasses were given if needed.     Reynaldo Barron, OD  [facility]  [place]  [address] [address] [address] [facility]  [place]  [address]    [phone]                        Again, thank you for allowing me to participate in the care of your patient.        Sincerely,        Reynaldo Barron OD    Electronically signed

## 2025-06-23 NOTE — PROGRESS NOTES
Chief Complaint   Patient presents with    COMPREHENSIVE EYE EXAM    Contact Lens Re-fitting        Previous contact lens wearer? Yes: Wears Biofinity   Comfort of contact lenses :Good   Satisfied with current lenses: Yes      Last Eye Exam: 2023  Dilated Previously: Yes    What are you currently using to see?  Contacts and glasses     Distance Vision Acuity: Noticed gradual change in both eyes, thinks that everything has gotten worse     Near Vision Acuity: Not satisfied, getting worse      Eye Comfort: good, tired by the end of the day   Do you use eye drops? : Yes: Uses a NP tear for dryness, and Lumify to calm her eyes when she doesn't have contacts in. Would like to try Latisse.   Occupation or Hobbies: Retired       [employer] Assistant      Medical, surgical and family histories reviewed and updated 6/23/2025.       OBJECTIVE: See Ophthalmology exam    ASSESSMENT:    ICD-10-CM    1. Encounter for examination of eyes and vision with abnormal findings  Z01.01       2. Combined forms of age-related cataract of both eyes  H25.813       3. Disorder of eyelashes  H02.89       4. Myopia of both eyes  H52.13       5. Presbyopia  H52.4          PLAN:     Patient Instructions   Continue with current contact lens prescription.     Distance glasses prescription provided today.     Order for Latisse entered to pharmacy today. I recommend calling the pharmacy to check on pricing before filling.     Return in 1 year for a comprehensive eye exam, or sooner if needed.      The effects of the dilating drops last for 4- 6 hours.  You will be more sensitive to light and vision will be blurry up close.  Mydriatic sunglasses were given if needed.     Reynaldo Barron, OD  [facility]  [address] [place] [address]. [place]  [address]    [phone]

## 2025-06-23 NOTE — PATIENT INSTRUCTIONS
Continue with current contact lens prescription.     Distance glasses prescription provided today.     Order for Latisse entered to pharmacy today. I recommend calling the pharmacy to check on pricing before filling.     Return in 1 year for a comprehensive eye exam, or sooner if needed.      The effects of the dilating drops last for 4- 6 hours.  You will be more sensitive to light and vision will be blurry up close.  Mydriatic sunglasses were given if needed.     Reynaldo Barron, OD  [facility]  [place]  [address]    [phone]

## 2025-06-25 ENCOUNTER — OFFICE VISIT (OUTPATIENT)
Dept: DERMATOLOGY | Facility: CLINIC | Age: 75
End: 2025-06-25
Payer: COMMERCIAL

## 2025-06-25 DIAGNOSIS — D22.9 MULTIPLE BENIGN NEVI: Primary | ICD-10-CM

## 2025-06-25 DIAGNOSIS — L81.4 LENTIGO: ICD-10-CM

## 2025-06-25 DIAGNOSIS — Z85.89 HISTORY OF SQUAMOUS CELL CARCINOMA: ICD-10-CM

## 2025-06-25 DIAGNOSIS — L82.1 SEBORRHEIC KERATOSIS: ICD-10-CM

## 2025-06-25 DIAGNOSIS — D18.01 CHERRY ANGIOMA: ICD-10-CM

## 2025-06-25 DIAGNOSIS — L57.0 AK (ACTINIC KERATOSIS): ICD-10-CM

## 2025-06-25 PROCEDURE — 99213 OFFICE O/P EST LOW 20 MIN: CPT | Mod: 25 | Performed by: PHYSICIAN ASSISTANT

## 2025-06-25 PROCEDURE — 17000 DESTRUCT PREMALG LESION: CPT | Performed by: PHYSICIAN ASSISTANT

## 2025-06-25 NOTE — LETTER
2025      Sara Keita  [address] [facility] [address]      Dear Colleague,    Thank you for referring your patient, Sara Keita, to the [facility]. Please see a copy of my visit note below.    Sara Keita is a pleasant 74 year old year old female patient here today for skin check. She has SCC removed from left posterior thigh in 2019. She did compounded efudex this spring, which did well. She notes some rough areas on face that are getting irritated. No pain or bleeding.  Patient has no other skin complaints today.  Remainder of the HPI, Meds, PMH, Allergies, FH, and SH was reviewed in chart.    Pertinent Hx:   History of SCC on left thigh   Past Medical History:   Diagnosis Date     Depressive disorder      Hand arthritis 2024     Squamous cell carcinoma of skin, unspecified        Past Surgical History:   Procedure Laterality Date     COLONOSCOPY  2016    Collagenous colitis     ENT SURGERY  20 yr ago    For migraines        Family History   Problem Relation Age of Onset     Diabetes Father         Adult onset at age 86     Glaucoma No family hx of      Macular Degeneration No family hx of        Social History     Socioeconomic History     Marital status: Single     Spouse name: Not on file     Number of children: Not on file     Years of education: Not on file     Highest education level: Not on file   Occupational History     Not on file   Tobacco Use     Smoking status: Former     Current packs/day: 0.00     Average packs/day: 1 pack/day for 7.0 years (7.0 ttl pk-yrs)     Types: Cigarettes     Start date: 1966     Quit date: 1973     Years since quittin.5     Passive exposure: Never     Smokeless tobacco: Never     Tobacco comments:     I smoked from  to    Vaping Use     Vaping status: Never Used   Substance and Sexual Activity     Alcohol use: Not Currently     Comment: I quit Dec. 31, 1987     Drug use: Never     Sexual  activity: Not Currently     Partners: Male     Birth control/protection: None   Other Topics Concern     Parent/sibling w/ CABG, MI or angioplasty before 65F 55M? No   Social History Narrative     Not on file     Social Drivers of Health     Financial Resource Strain: Low Risk  (3/3/2025)    Financial Resource Strain      Within the past 12 months, have you or your family members you live with been unable to get utilities (heat, electricity) when it was really needed?: No   Food Insecurity: Low Risk  (3/3/2025)    Food Insecurity      Within the past 12 months, did you worry that your food would run out before you got money to buy more?: No      Within the past 12 months, did the food you bought just not last and you didn t have money to get more?: No   Transportation Needs: Low Risk  (3/3/2025)    Transportation Needs      Within the past 12 months, has lack of transportation kept you from medical appointments, getting your medicines, non-medical meetings or appointments, work, or from getting things that you need?: No   Physical Activity: Sufficiently Active (3/3/2025)    Exercise Vital Sign      Days of Exercise per Week: 5 days      Minutes of Exercise per Session: 30 min   Stress: No Stress Concern Present (3/3/2025)    [place] [employer] - Occupational Stress Questionnaire      Feeling of Stress : Not at all   Social Connections: Unknown (3/3/2025)    Social Connection and Isolation Panel [NHANES]      Frequency of Communication with Friends and Family: Not on file      Frequency of Social Gatherings with Friends and Family: More than three times a week      Attends Protestant Services: Not on file      Active Member of Clubs or Organizations: Not on file      Attends Club or Organization Meetings: Not on file      Marital Status: Not on file   Interpersonal Safety: Low Risk  (3/6/2025)    Interpersonal Safety      Do you feel physically and emotionally safe where you currently live?: Yes       Within the past 12 months, have you been hit, slapped, kicked or otherwise physically hurt by someone?: No      Within the past 12 months, have you been humiliated or emotionally abused in other ways by your partner or ex-partner?: No   Housing Stability: Low Risk  (3/3/2025)    Housing Stability      Do you have housing? : Yes      Are you worried about losing your housing?: No       Outpatient Encounter Medications as of 6/25/2025   Medication Sig Dispense Refill     albuterol (PROAIR HFA/PROVENTIL HFA/VENTOLIN HFA) 108 (90 Base) MCG/ACT inhaler Inhale 1-2 puffs into the lungs every 4 hours as needed for shortness of breath, wheezing or cough. 51 g 1     alendronate (FOSAMAX) 70 MG tablet Take 1 tablet (70 mg) by mouth every 7 days. Take 60 minutes before am meal with 8 oz. water. Remain upright for 30 minutes. 12 tablet 3     atorvastatin (LIPITOR) 20 MG tablet Take 1 tablet (20 mg) by mouth daily. 100 tablet 3     bimatoprost (LATISSE) 0.03 % external opthalmic solution Apply 1 drop topically at bedtime. 5 mL 5     budesonide (ENTOCORT EC) 3 MG EC capsule Take 2 capsules (6 mg) by mouth every morning. 60 capsule 1     CALCIUM MAGNESIUM ZINC PO Take 1 tablet by mouth daily. Serving size is 2 tablets daily  Vitamin D3 800 international unit(s) per serving size  Calcium 500 mg per serving size       citalopram (CELEXA) 20 MG tablet Take 1 tablet (20 mg) by mouth daily. 90 tablet 3     citalopram (CELEXA) 20 MG tablet TAKE 1 TABLET BY MOUTH DAILY 90 tablet 3     cyclobenzaprine (FLEXERIL) 10 MG tablet Take 1 tablet (10 mg) by mouth 3 times daily as needed for muscle spasms. 30 tablet 1     fish oil-omega-3 fatty acids 1000 MG capsule Take 1 g by mouth daily.       Misc Natural Products (GLUCOSAMINE CHOND MSM FORMULA PO) Take 1 capsule by mouth daily. Serving size is 2 capsule daily       multivitamin w/minerals (MULTI-VITAMIN) tablet Take 1 tablet by mouth daily. Vitamin D 1,000 international unit(s) per  tablet  Calcium 220 mg per tab       Probiotic Product ([employer] PROBIOTIC/SUPER GREENS) POWD Take 1 Stick by mouth daily. Calcium 29 mg per 1 stick       psyllium (METAMUCIL/KONSYL) 58.6 % powder Take 3 Tablespoonful by mouth daily.       triamcinolone (KENALOG) 0.1 % external cream Apply topically 2 times daily 30 g 3     UNABLE TO FIND MEDICATION NAME: bone broth protein  Protein, sodium and potassium       vitamin D3 (CHOLECALCIFEROL) 50 mcg (2000 units) tablet Take 1 tablet by mouth daily.       zolpidem (AMBIEN) 5 MG tablet Take tablet by mouth 15 minutes prior to sleep, for Sleep Study 1 tablet 0     zolpidem (AMBIEN) 5 MG tablet Take 1 tablet (5 mg) by mouth nightly as needed for sleep. 30 tablet 0     No facility-administered encounter medications on file as of 6/25/2025.             O:   NAD, WDWN, Alert & Oriented, Mood & Affect wnl, Vitals stable   Here today alone   LMP  (LMP Unknown)    General appearance normal   Vitals stable   Alert, oriented and in no acute distress      Brown stuck papules on face   Stuck on papules and brown macules on trunk and ext   Red papules on trunk  Brown papules and macules with regular pigment network and borders on torso and extremities  Gritty papule on nose x 1      The remainder of skin exam is normal       Eyes: Conjunctivae/lids:Normal     ENT: Lips: normal    MSK:Normal    Cardiovascular: peripheral edema none    Pulm: Breathing Normal    Neuro/Psych: Orientation:Alert and Orientedx3 ; Mood/Affect:normal   A/P:  Actinic keratosis x 1 on nose   LN2:  Treated with LN2 for 5s for 1-2 cycles. Warned risks of blistering, pain, pigment change, scarring, and incomplete resolution.  Advised patient to return if lesions do not completely resolve.  Wound care sheet given.  2.Seborrheic keratosis, lentigo, angioma, benign nevi, history of SCC  BENIGN LESIONS DISCUSSED WITH PATIENT:  I discussed the specifics of tumor, prognosis, and genetics of benign lesions.  I explained that  treatment of these lesions would be purely cosmetic and not medically neccessary.  I discussed with patient different removal options including excision, cautery and /or laser.      Nature and genetics of benign skin lesions dicussed with patient.  Signs and Symptoms of skin cancer discussed with patient.  ABCDEs of melanoma reviewed with patient.  Patient encouraged to perform monthly skin exams.  UV precautions reviewed with patient.  Risks of non-melanoma skin cancer discussed with patient   Return to clinic in one year or sooner if needed.     Again, thank you for allowing me to participate in the care of your patient.        Sincerely,        Lynne Odom PA-C    Electronically signed

## 2025-06-26 NOTE — PROGRESS NOTES
Sara Keita is a pleasant 74 year old year old female patient here today for skin check. She has SCC removed from left posterior thigh in 2019. She did compounded efudex this spring, which did well. She notes some rough areas on face that are getting irritated. No pain or bleeding.  Patient has no other skin complaints today.  Remainder of the HPI, Meds, PMH, Allergies, FH, and SH was reviewed in chart.    Pertinent Hx:   History of SCC on left thigh   Past Medical History:   Diagnosis Date    Depressive disorder 2006    Hand arthritis 2024    Squamous cell carcinoma of skin, unspecified        Past Surgical History:   Procedure Laterality Date    COLONOSCOPY  2016    Collagenous colitis    ENT SURGERY  20 yr ago    For migraines        Family History   Problem Relation Age of Onset    Diabetes Father         Adult onset at age 86    Glaucoma No family hx of     Macular Degeneration No family hx of        Social History     Socioeconomic History    Marital status: Single     Spouse name: Not on file    Number of children: Not on file    Years of education: Not on file    Highest education level: Not on file   Occupational History    Not on file   Tobacco Use    Smoking status: Former     Current packs/day: 0.00     Average packs/day: 1 pack/day for 7.0 years (7.0 ttl pk-yrs)     Types: Cigarettes     Start date: 1966     Quit date: 1973     Years since quittin.5     Passive exposure: Never    Smokeless tobacco: Never    Tobacco comments:     I smoked from  to    Vaping Use    Vaping status: Never Used   Substance and Sexual Activity    Alcohol use: Not Currently     Comment: I quit Dec. 31, 1987    Drug use: Never    Sexual activity: Not Currently     Partners: Male     Birth control/protection: None   Other Topics Concern    Parent/sibling w/ CABG, MI or angioplasty before 65F 55M? No   Social History Narrative    Not on file     Social Drivers of Health     Financial Resource  Strain: Low Risk  (3/3/2025)    Financial Resource Strain     Within the past 12 months, have you or your family members you live with been unable to get utilities (heat, electricity) when it was really needed?: No   Food Insecurity: Low Risk  (3/3/2025)    Food Insecurity     Within the past 12 months, did you worry that your food would run out before you got money to buy more?: No     Within the past 12 months, did the food you bought just not last and you didn t have money to get more?: No   Transportation Needs: Low Risk  (3/3/2025)    Transportation Needs     Within the past 12 months, has lack of transportation kept you from medical appointments, getting your medicines, non-medical meetings or appointments, work, or from getting things that you need?: No   Physical Activity: Sufficiently Active (3/3/2025)    Exercise Vital Sign     Days of Exercise per Week: 5 days     Minutes of Exercise per Session: 30 min   Stress: No Stress Concern Present (3/3/2025)    [employer] [place] [employer] - Occupational Stress Questionnaire     Feeling of Stress : Not at all   Social Connections: Unknown (3/3/2025)    Social Connection and Isolation Panel [NHANES]     Frequency of Communication with Friends and Family: Not on file     Frequency of Social Gatherings with Friends and Family: More than three times a week     Attends Jain Services: Not on file     Active Member of Clubs or Organizations: Not on file     Attends Club or Organization Meetings: Not on file     Marital Status: Not on file   Interpersonal Safety: Low Risk  (3/6/2025)    Interpersonal Safety     Do you feel physically and emotionally safe where you currently live?: Yes     Within the past 12 months, have you been hit, slapped, kicked or otherwise physically hurt by someone?: No     Within the past 12 months, have you been humiliated or emotionally abused in other ways by your partner or ex-partner?: No   Housing Stability: Low Risk   (3/3/2025)    Housing Stability     Do you have housing? : Yes     Are you worried about losing your housing?: No       Outpatient Encounter Medications as of 6/25/2025   Medication Sig Dispense Refill    albuterol (PROAIR HFA/PROVENTIL HFA/VENTOLIN HFA) 108 (90 Base) MCG/ACT inhaler Inhale 1-2 puffs into the lungs every 4 hours as needed for shortness of breath, wheezing or cough. 51 g 1    alendronate (FOSAMAX) 70 MG tablet Take 1 tablet (70 mg) by mouth every 7 days. Take 60 minutes before am meal with 8 oz. water. Remain upright for 30 minutes. 12 tablet 3    atorvastatin (LIPITOR) 20 MG tablet Take 1 tablet (20 mg) by mouth daily. 100 tablet 3    bimatoprost (LATISSE) 0.03 % external opthalmic solution Apply 1 drop topically at bedtime. 5 mL 5    budesonide (ENTOCORT EC) 3 MG EC capsule Take 2 capsules (6 mg) by mouth every morning. 60 capsule 1    CALCIUM MAGNESIUM ZINC PO Take 1 tablet by mouth daily. Serving size is 2 tablets daily  Vitamin D3 800 international unit(s) per serving size  Calcium 500 mg per serving size      citalopram (CELEXA) 20 MG tablet Take 1 tablet (20 mg) by mouth daily. 90 tablet 3    citalopram (CELEXA) 20 MG tablet TAKE 1 TABLET BY MOUTH DAILY 90 tablet 3    cyclobenzaprine (FLEXERIL) 10 MG tablet Take 1 tablet (10 mg) by mouth 3 times daily as needed for muscle spasms. 30 tablet 1    fish oil-omega-3 fatty acids 1000 MG capsule Take 1 g by mouth daily.      Misc Natural Products (GLUCOSAMINE CHOND MSM FORMULA PO) Take 1 capsule by mouth daily. Serving size is 2 capsule daily      multivitamin w/minerals (MULTI-VITAMIN) tablet Take 1 tablet by mouth daily. Vitamin D 1,000 international unit(s) per tablet  Calcium 220 mg per tab      Probiotic Product ([employer] PROBIOTIC/SUPER GREENS) POWD Take 1 Stick by mouth daily. Calcium 29 mg per 1 stick      psyllium (METAMUCIL/KONSYL) 58.6 % powder Take 3 Tablespoonful by mouth daily.      triamcinolone (KENALOG) 0.1 % external cream Apply  topically 2 times daily 30 g 3    UNABLE TO FIND MEDICATION NAME: bone broth protein  Protein, sodium and potassium      vitamin D3 (CHOLECALCIFEROL) 50 mcg (2000 units) tablet Take 1 tablet by mouth daily.      zolpidem (AMBIEN) 5 MG tablet Take tablet by mouth 15 minutes prior to sleep, for Sleep Study 1 tablet 0    zolpidem (AMBIEN) 5 MG tablet Take 1 tablet (5 mg) by mouth nightly as needed for sleep. 30 tablet 0     No facility-administered encounter medications on file as of 6/25/2025.             O:   NAD, WDWN, Alert & Oriented, Mood & Affect wnl, Vitals stable   Here today alone   LMP  (LMP Unknown)    General appearance normal   Vitals stable   Alert, oriented and in no acute distress      Brown stuck papules on face   Stuck on papules and brown macules on trunk and ext   Red papules on trunk  Brown papules and macules with regular pigment network and borders on torso and extremities  Gritty papule on nose x 1      The remainder of skin exam is normal       Eyes: Conjunctivae/lids:Normal     ENT: Lips: normal    MSK:Normal    Cardiovascular: peripheral edema none    Pulm: Breathing Normal    Neuro/Psych: Orientation:Alert and Orientedx3 ; Mood/Affect:normal   A/P:  Actinic keratosis x 1 on nose   LN2:  Treated with LN2 for 5s for 1-2 cycles. Warned risks of blistering, pain, pigment change, scarring, and incomplete resolution.  Advised patient to return if lesions do not completely resolve.  Wound care sheet given.  2.Seborrheic keratosis, lentigo, angioma, benign nevi, history of SCC  BENIGN LESIONS DISCUSSED WITH PATIENT:  I discussed the specifics of tumor, prognosis, and genetics of benign lesions.  I explained that treatment of these lesions would be purely cosmetic and not medically neccessary.  I discussed with patient different removal options including excision, cautery and /or laser.      Nature and genetics of benign skin lesions dicussed with patient.  Signs and Symptoms of skin cancer  discussed with patient.  ABCDEs of melanoma reviewed with patient.  Patient encouraged to perform monthly skin exams.  UV precautions reviewed with patient.  Risks of non-melanoma skin cancer discussed with patient   Return to clinic in one year or sooner if needed.

## 2025-07-22 ENCOUNTER — OFFICE VISIT (OUTPATIENT)
Dept: AUDIOLOGY | Facility: CLINIC | Age: 75
End: 2025-07-22
Attending: FAMILY MEDICINE
Payer: COMMERCIAL

## 2025-07-22 DIAGNOSIS — H90.8 MIXED CONDUCTIVE AND SENSORINEURAL HEARING LOSS, UNSPECIFIED LATERALITY: ICD-10-CM

## 2025-07-22 PROCEDURE — 92557 COMPREHENSIVE HEARING TEST: CPT | Performed by: AUDIOLOGIST

## 2025-07-22 PROCEDURE — 92550 TYMPANOMETRY & REFLEX THRESH: CPT | Performed by: AUDIOLOGIST

## 2025-07-22 NOTE — PROGRESS NOTES
AUDIOLOGY REPORT    SUBJECTIVE:  Sara Keita is a 74 year old female who was seen in the Audiology Clinic at the [facility] for audiologic evaluation, referred by Jasmin Mcintyre M.D.  The patient reports that she notices herself asking for repetitions.  It is difficult for her to hear when people are talking to her from the side.  She has occasional tinnitus. The patient denies  bilateral otalgia, bilateral drainage, bilateral aural fullness, and family history of hearing loss.  There is a history of recreational noise exposure.  The patient notes difficulty with communication in a variety of listening situations involving background noise.  They were unaccompanied today.      OBJECTIVE:  Falls Risk Screening  Falls Risk Completed by: audiology  Have you fallen 2 or more times in the past year? No  Have you fallen and had an injury in the past year? No  Is the patient receiving Physical Therapy services? No  Fall Screen Comments:          Otoscopic exam indicates ears are clear of cerumen bilaterally     Pure Tone Thresholds assessed using conventional audiometry with good  reliability from 250-8000 Hz bilaterally using insert earphones and circumaural headphones     RIGHT:  normal sloping to moderate-severe sensorineural hearing loss    LEFT:    normal sloping to moderate-severe sensorineural hearing loss    Tympanogram:    RIGHT: normal eardrum mobility    LEFT:   normal eardrum mobility    Reflexes (reported by stimulus ear):  RIGHT: Ipsilateral is present at normal levels  RIGHT: Contralateral is present at normal levels  LEFT:   Ipsilateral is present at normal levels  LEFT:   Contralateral is present at normal levels      Speech Reception Threshold:    RIGHT: 25 dB HL    LEFT:   20 dB HL  Word Recognition Score:     RIGHT: 100% at 65 dB HL using NU-6 recorded word list.    LEFT:   100% at 60 dB HL using NU-6 recorded word list.      ASSESSMENT:     Bilateral sensorineural  hearing loss.  The patient is a candidate for amplification and was invited to schedule a hearing aid consultation at their convenience.  Today s results were discussed with the patient in detail.     PLAN:  Patient was counseled regarding hearing loss and impact on communication.  Patient is a good candidate for amplification at this time.  It is recommended that the patient consider amplification and schedule a hearing aid evaluation at her convenience.  Recheck hearing in 1-2 years or sooner if changes are noticed.  Please call this clinic with questions regarding these results or recommendations.        Arlin Cervantes.   Doctor of Audiology  License #[id number]

## 2025-08-01 ENCOUNTER — ANCILLARY PROCEDURE (OUTPATIENT)
Dept: ULTRASOUND IMAGING | Facility: CLINIC | Age: 75
End: 2025-08-01
Attending: FAMILY MEDICINE
Payer: COMMERCIAL

## 2025-08-01 ENCOUNTER — ANCILLARY PROCEDURE (OUTPATIENT)
Dept: MAMMOGRAPHY | Facility: CLINIC | Age: 75
End: 2025-08-01
Attending: FAMILY MEDICINE
Payer: COMMERCIAL

## 2025-08-01 DIAGNOSIS — R92.8 ABNORMAL MAMMOGRAM: ICD-10-CM

## 2025-08-01 PROCEDURE — 76642 ULTRASOUND BREAST LIMITED: CPT | Mod: LT | Performed by: RADIOLOGY

## 2025-08-01 PROCEDURE — 77065 DX MAMMO INCL CAD UNI: CPT | Mod: LT | Performed by: RADIOLOGY

## 2025-08-01 PROCEDURE — G0279 TOMOSYNTHESIS, MAMMO: HCPCS | Performed by: RADIOLOGY

## 2025-09-01 DIAGNOSIS — R21 RASH: ICD-10-CM

## 2025-09-02 ENCOUNTER — MYC REFILL (OUTPATIENT)
Dept: FAMILY MEDICINE | Facility: CLINIC | Age: 75
End: 2025-09-02
Payer: COMMERCIAL

## 2025-09-02 DIAGNOSIS — R21 RASH: ICD-10-CM

## 2025-09-02 RX ORDER — TRIAMCINOLONE ACETONIDE 1 MG/G
CREAM TOPICAL 2 TIMES DAILY
Qty: 120 G | Refills: 0 | Status: SHIPPED | OUTPATIENT
Start: 2025-09-02

## 2025-09-02 RX ORDER — TRIAMCINOLONE ACETONIDE 1 MG/G
CREAM TOPICAL 2 TIMES DAILY
Qty: 120 G | Refills: 0 | OUTPATIENT
Start: 2025-09-02